# Patient Record
Sex: MALE | Race: WHITE | NOT HISPANIC OR LATINO | Employment: OTHER
[De-identification: names, ages, dates, MRNs, and addresses within clinical notes are randomized per-mention and may not be internally consistent; named-entity substitution may affect disease eponyms.]

---

## 2017-01-27 ENCOUNTER — RECORDS - HEALTHEAST (OUTPATIENT)
Dept: ADMINISTRATIVE | Facility: OTHER | Age: 82
End: 2017-01-27

## 2017-01-27 ENCOUNTER — HOSPITAL ENCOUNTER (OUTPATIENT)
Dept: MRI IMAGING | Facility: HOSPITAL | Age: 82
Discharge: HOME | End: 2017-01-27

## 2017-01-27 DIAGNOSIS — M54.16 LUMBAR RADICULOPATHY: ICD-10-CM

## 2017-01-30 ENCOUNTER — AMBULATORY - HEALTHEAST (OUTPATIENT)
Dept: NEUROSURGERY | Facility: CLINIC | Age: 82
End: 2017-01-30

## 2017-01-30 DIAGNOSIS — M54.9 BACK PAIN: ICD-10-CM

## 2017-01-31 ENCOUNTER — AMBULATORY - HEALTHEAST (OUTPATIENT)
Dept: NEUROSURGERY | Facility: CLINIC | Age: 82
End: 2017-01-31

## 2017-01-31 ENCOUNTER — OFFICE VISIT - HEALTHEAST (OUTPATIENT)
Dept: NEUROSURGERY | Facility: CLINIC | Age: 82
End: 2017-01-31

## 2017-01-31 ENCOUNTER — HOSPITAL ENCOUNTER (OUTPATIENT)
Dept: RADIOLOGY | Facility: CLINIC | Age: 82
Discharge: HOME | End: 2017-01-31
Attending: NEUROLOGICAL SURGERY

## 2017-01-31 DIAGNOSIS — M46.1 SI JOINT ARTHRITIS (H): ICD-10-CM

## 2017-01-31 DIAGNOSIS — M54.9 BACK PAIN: ICD-10-CM

## 2017-01-31 ASSESSMENT — MIFFLIN-ST. JEOR: SCORE: 1662.83

## 2017-02-01 ENCOUNTER — HOSPITAL ENCOUNTER (OUTPATIENT)
Dept: PHYSICAL MEDICINE AND REHAB | Facility: CLINIC | Age: 82
Discharge: HOME | End: 2017-02-01
Attending: NEUROLOGICAL SURGERY

## 2017-02-01 DIAGNOSIS — M46.1 SI JOINT ARTHRITIS (H): ICD-10-CM

## 2017-02-01 DIAGNOSIS — M46.98 UNSPECIFIED INFLAMMATORY SPONDYLOPATHY, SACRAL AND SACROCOCCYGEAL REGION (H): ICD-10-CM

## 2017-02-06 ENCOUNTER — COMMUNICATION - HEALTHEAST (OUTPATIENT)
Dept: NEUROSURGERY | Facility: CLINIC | Age: 82
End: 2017-02-06

## 2019-08-29 NOTE — TELEPHONE ENCOUNTER
ONCOLOGY INTAKE: Records Information      APPT INFORMATION:  Referring provider:  JADA  Referring provider s clinic:  NA  Reason for visit/diagnosis:  2nd opinion, Metastatic Prostate Cancer  Has patient been notified of appointment date and time?: Per PT    RECORDS INFORMATION:  Were the records received with the referral (via [employer])? No    Has patient been seen for any external appt for this diagnosis? Per PT Original Bx @  [facility] approx 2015;  Imaging @ [facility]; Other records and imaging @ [facility]    ADDITIONAL INFORMATION:  JADA

## 2019-09-09 ENCOUNTER — PRE VISIT (OUTPATIENT)
Dept: ONCOLOGY | Facility: CLINIC | Age: 84
End: 2019-09-09

## 2019-09-09 ENCOUNTER — ONCOLOGY VISIT (OUTPATIENT)
Dept: ONCOLOGY | Facility: CLINIC | Age: 84
End: 2019-09-09
Attending: INTERNAL MEDICINE
Payer: MEDICARE

## 2019-09-09 VITALS
OXYGEN SATURATION: 95 % | TEMPERATURE: 97.6 F | DIASTOLIC BLOOD PRESSURE: 82 MMHG | SYSTOLIC BLOOD PRESSURE: 147 MMHG | HEIGHT: 68 IN | BODY MASS INDEX: 35.31 KG/M2 | RESPIRATION RATE: 14 BRPM | HEART RATE: 56 BPM | WEIGHT: 233 LBS

## 2019-09-09 DIAGNOSIS — C61 PROSTATE CANCER (H): Primary | ICD-10-CM

## 2019-09-09 PROBLEM — I45.9 CARDIAC CONDUCTION DISORDER: Status: ACTIVE | Noted: 2018-07-19

## 2019-09-09 PROBLEM — M54.59 MECHANICAL LOW BACK PAIN: Status: ACTIVE | Noted: 2017-02-08

## 2019-09-09 PROBLEM — M10.9 GOUT, ARTHRITIS: Status: ACTIVE | Noted: 2018-12-06

## 2019-09-09 PROCEDURE — 99204 OFFICE O/P NEW MOD 45 MIN: CPT | Mod: GC | Performed by: INTERNAL MEDICINE

## 2019-09-09 PROCEDURE — G0463 HOSPITAL OUTPT CLINIC VISIT: HCPCS | Mod: ZF

## 2019-09-09 RX ORDER — DIPHENOXYLATE HYDROCHLORIDE AND ATROPINE SULFATE 2.5; .025 MG/1; MG/1
TABLET ORAL
COMMUNITY

## 2019-09-09 RX ORDER — TRIAMTERENE AND HYDROCHLOROTHIAZIDE 37.5; 25 MG/1; MG/1
CAPSULE ORAL
Refills: 3 | COMMUNITY
Start: 2019-06-17

## 2019-09-09 RX ORDER — LISINOPRIL 20 MG/1
TABLET ORAL
COMMUNITY
Start: 2016-01-14 | End: 2019-09-09

## 2019-09-09 RX ORDER — LANOLIN ALCOHOL/MO/W.PET/CERES
1.5 CREAM (GRAM) TOPICAL
COMMUNITY

## 2019-09-09 RX ORDER — SILDENAFIL 100 MG/1
100 TABLET, FILM COATED ORAL
COMMUNITY
End: 2019-09-09

## 2019-09-09 RX ORDER — POTASSIUM CHLORIDE 750 MG/1
TABLET, EXTENDED RELEASE ORAL
Refills: 1 | COMMUNITY
Start: 2019-07-27

## 2019-09-09 ASSESSMENT — MIFFLIN-ST. JEOR: SCORE: 1706.38

## 2019-09-09 ASSESSMENT — PAIN SCALES - GENERAL: PAINLEVEL: MILD PAIN (2)

## 2019-09-09 NOTE — LETTER
9/9/2019       RE: Shailesh Hartman  [id number] [id number]     Dear Colleague,    Thank you for referring your patient, Shailesh Hartman, to the [facility]. Please see a copy of my visit note below.    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 He was diagnosed initially in 2013 when a ROXANN revealed a prostate nodule. Biopsy on 9/30/13 showed a [place] 4+4 prostate adenocarcinoma. PSA at that time was 14.2. He was treated with radiation in early 2014. PSA declined to a julisa of 1.21 following radiation. CT in April 2016 showed possible enlargement of the right iliac lymph nodes, biopsy of which showed prostate adenocarcinoma in May 2016. Bone scan at that time was negative. PSA kaila to 4.85 in June 2016 and he [facility] Surgical pathology showed 3 of 5 right iliac lymph nodes and 1 of 5 left iliac lymph nodes removed were positive for metastatic prostatic adenocarcinoma. Choline PET in November 2016 showed uptake in the left retroperitoneal lymph nodes and right iliac lymph nodes extending to a perirectal node. PSA was 3.9 at that time, and he was recommended to start ADT and radiotherapy. He started with a loading dose of Degarelix in November 2016 and transitioned to Lupron. PSA dropped to 0.98. He completed 25 fractions of radiotherapy to the pelvic and paraaortic lymph nodes. PSA became undetectable in March 2017. He continued Lupron until November 2017. In the spring of 2018 he noted intense fatigue with decreased exercise tolerance and stopped Lupron. PSA at that time was  0.14. Imaging in September 2018 demonstrated some multifocal activity within the prostate gland and very faint non-specific activity along the internal iliac chain, as well as focal activity in the right side of T2 and T4 vertebral bodies. It was thought that the small rise in his PSA was secondary to benign tissue. Repeat imaging in January 2019 demonstrated interval decrease in the activity in the prostate, upper thoracic spine, and lymphadenopathy. No new bone metastases were identified. He was recommended to continue to monitor his PSA (off ADT) and return intermittently for imaging. Choline PET in June 2019 showed ongoing uptake in the T2 and T4 vertebral bodies, but now faint. His case was evidently discussed at a tumor conference and it was thought that he may benefit from focused radiation to the T-spine lesions. PSA over this time period has remained low but detectable.     He presents to clinic today with his wife seeking a second opinion regarding his prostate cancer. He is hoping to avoid radiation to the spine due to fear of complications and lack of benefit given overall stable/improved appearance on the last several PET scans. He is overall doing very well. He remains very active and travels frequently with family. He has some mid-back pain but relates this more to degenerative changes rather than pain from the known T-spine lesions. No bothersome urinary symptoms. He reports excellent energy level off of Lupron, and spends a couple hours in a gym a few times per week.     REVIEW OF SYSTEMS:  A full 14 point ROS negative other than the symptoms noted above in the HPI.    PAST MEDICAL HISTORY:  Active Ambulatory Problems     Diagnosis Date Noted     Achilles tendonitis 01/28/2010     Family history of colon cancer 12/01/2015     Mechanical low back pain 02/08/2017     Primary prostate cancer with metastasis from prostate to other site (H) 11/08/2016     Malignant neoplasm of prostate (H) 06/12/2014      Primary malignant neoplasm of prostate (H) 2016     Hyperlipidemia 2005     Gout, arthritis 2018     Essential hypertension, benign 2001     Elevated prostate specific antigen (PSA) 2001     Cardiac conduction disorder 2018     Bradycardia 2010     Resolved Ambulatory Problems     Diagnosis Date Noted     No Resolved Ambulatory Problems     No Additional Past Medical History       PAST SURGICAL HISTORY:   Past Surgical History:   Procedure Laterality Date     ADENOIDECTOMY  1938     HIP SURGERY          SOCIAL HISTORY:   - Lives with wife in [place], [place]. 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 They travel frequently to [place] to visit family members.  - Non-smoker. No EtOH.    FAMILY HISTORY:   - Father  with pancreatic cancer in his 70s.   - Daughter was diagnosed with Stage IV colorectal cancer and has been living with this for >7 years.  - Son has diabetes.    ALLERGIES:   Allergies   Allergen Reactions     Seasonal Allergies Other (See Comments)     Running nose     Wheat Bran Other (See Comments) and Unknown     Runny nose  Runny nose         CURRENT MEDICATIONS:     Current Outpatient Medications:      allopurinol (ZYLOPRIM) 300 MG tablet, Take 300 mg by mouth daily, Disp: , Rfl:      diphenhydrAMINE-acetaminophen (TYLENOL PM)  MG tablet, Take 1 tablet by mouth nightly as needed for sleep, Disp: , Rfl:      hydrochlorothiazide (MICROZIDE) 12.5 MG capsule, Take 12.5 mg by mouth daily, Disp: , Rfl:      lisinopril (PRINIVIL,ZESTRIL) 20 MG tablet, Take 20 mg by mouth 2 times daily, Disp: , Rfl:      melatonin 3 MG tablet, Take 1.5 mg by mouth nightly as needed for sleep, Disp: , Rfl:      Multiple Vitamin (MULTI-VITAMINS) TABS, 1 tab by mouth daily, Disp: , Rfl:      potassium chloride ER (K-TAB/KLOR-CON) 10 MEQ CR tablet, TAKES 10 MEQ  "BID, Disp: , Rfl: 1     simvastatin (ZOCOR) 20 MG tablet, Take 1 tablet by mouth At Bedtime, Disp: , Rfl:      triamterene-HCTZ (DYAZIDE) 37.5-25 MG capsule, TK 1 C PO D, Disp: , Rfl: 3    PHYSICAL EXAMINATION:  Vital signs: BP (!) 147/82 (BP Location: Left arm, Patient Position: Chair, Cuff Size: Adult Large)   Pulse 56   Temp 97.6  F (36.4  C) (Oral)   Resp 14   Ht 1.727 m (5' 8\")   Wt 105.7 kg (233 lb)   SpO2 95%   BMI 35.43 kg/m     ECOG performance status of 0. Fatigue 0.  GENERAL: Well-nourished healthy-appearing man, seated in chair, no acute distress.   HEENT: No icterus, no pallor. Moist mucous membranes. Oropharynx is clear.   NECK: Supple, no JVD/LAD.  LUNGS: Clear to ausculation bilaterally, normal work of breathing.   CARDIOVASCULAR: Regular rate and rhythm, no murmurs, gallops or rubs.   ABDOMEN: Soft, nontender and nondistended, no palpable masses, bowel sounds present.  EXTREMITIES: No cyanosis, no clubbing, no edema.   NEUROLOGIC: Alert and oriented. No focal deficits.    LABORATORY DATA:  Reviewed most recent labs from [place] in June 2019:  - Notable for normal BMP, LFTs    PSA Trend: (reviewed in Care Everywhere)    6/28/2016:  4.85    11/28/2016:  3.9 (started degarelix, transitioned to Lupron)    12/29/2016:  0.98    3/6/2017:  0.12    3/31/2017 - 4/26/2018:  <0.01 (stopped Lupron in early 2018)    7/19/2018:  0.14    9/21/2018:  0.34    1/25/2019:  0.70    6/5/2019:  0.82    8/21/2019:  0.80    Testosterone Levels: (reviewed in Care Everywhere)    11/28/2016:  244    12/29/2016 - 4/26/2018:  <7.0    7/19/2018:  89    9/21/2018:  176    6/5/2019:  251    IMAGING STUDIES:  CHOLINE PET/CT, 6/5/2019:  COMPARISON:  Choline PET/CT 1/25/2019.  IMPRESSION:  1.  Stable nonspecific diffuse uptake throughout prostate.  2.  Stable mild uptake within a tiny prevascular/aortopulmonary window lymph  node. Otherwise, no suspicious lymph nodes.  3.  Uptake within T2 and T4 vertebral body lesions are now " faint.    MR THORACIC SPINE WITHOUT IV CONTRAST, 6/5/2019:  COMPARISON: Prior PET ct choline studies the most recent dated 06/05/2019.  IMPRESSION:  1. Severe cervical spondylosis, with spinal stenosis and cord compression.  2. T2 and T4 metastases as described.  3. Lumbar spondylosis with spinal stenosis.    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 He was diagnosed initially in 2013 and was treated with radiation. He was found to have metastatic disease involving the pelvic lymph nodes, biopsy proven in 2016. He demonstrated good PSA response to ADT from late 2016 through early 2018, but did not tolerate well due to side effects, particularly fatigue, and stopped Lupron in early 2018. PSA has very gradually up-trended since then, and subsequent imaging has demonstrated concerning uptake in the T2 and T4 vertebral bodies, though this uptake has actually become more faint over time despite being off ADT for over a year. He was recommended focal radiation to these lesions, and is seeking a second opinion.     We suspect the patient has oligometastatic disease, initially quite hormone sensitive. Per his preference, he has now been off ADT for over a year, and testosterone level is now back in the normal range since last checked in June 2019. However, PSA remains very low and the only measurable disease includes the small T-spine lesions and a tiny lymph node in the chest. He prefers to stay off ADT and avoid additional systemic therapy and radiation. This is certainly a reasonable approach.    We discussed our recommendation to follow PSA and testosterone, and repeating imaging intermittently. If there is a significant rise in PSA over time, could consider simply restarting ADT. However, PSA rise with a low testosterone would argue for castration resistance. In this setting, could consider  abiraterone. In the meantime, we do agree that he should stay off ADT and continue observation. He will likely continue following with his team at [facility], but would certainly be welcome back in our clinic if concerns or questions arise.      Patient was seen and plan was discussed with Dr. Salvador.    Shanae Faust MD/PhD  Heme/Onc Fellow      Physician Attestation   I, Randall Ruiz MD, saw this patient and agree with the findings and plan of care as documented in the note.      Items personally reviewed/procedural attestation: vitals, labs and the plan as discussed.    Randall Ruiz MD

## 2019-09-09 NOTE — NURSING NOTE
"Oncology Rooming Note    September 9, 2019 9:06 AM   Shailesh Hartman is a 87 year old male who presents for:    Chief Complaint   Patient presents with     Oncology Clinic Visit     New Patient Visit for Prostate Cancer     Initial Vitals: BP (!) 147/82 (BP Location: Left arm, Patient Position: Chair, Cuff Size: Adult Large)   Pulse 56   Temp 97.6  F (36.4  C) (Oral)   Resp 14   Ht 1.727 m (5' 8\")   Wt 105.7 kg (233 lb)   SpO2 95%   BMI 35.43 kg/m   Estimated body mass index is 35.43 kg/m  as calculated from the following:    Height as of this encounter: 1.727 m (5' 8\").    Weight as of this encounter: 105.7 kg (233 lb). Body surface area is 2.25 meters squared.  Mild Pain (2) Comment: Data Unavailable   No LMP for male patient.  Allergies reviewed: Yes  Medications reviewed: Yes    Medications: Medication refills not needed today.  Pharmacy name entered into [email or web address] / [employer]: [facility] #[id number] - [facility] [address] AT [facility] OF [address]    Clinical concerns: Patient is here with his wife.  He is here for a second opinion on whether or not to do radiotherapy on lesions in T2 & T4.  He is currently a [facility] patient.  He does report some mild dysphagia at times but it does not interfere with his ability to eat.  He is maintaining his weight.  He also reports chronic mild left hip pain due to trochanteric bursitis.   [place] was notified.      Belia Humphrey, RN, MSN              "

## 2019-09-09 NOTE — PATIENT INSTRUCTIONS
1. Check Testosterone ( Total)  2. Agree with repeat scan in October  3. Many options available if the psa rises including   A. Focal radiation to the bony lesions   B. Abiraterone or similar drugs   C. Clinical trial with Abiraterone plus Abemiclib.

## 2019-09-09 NOTE — PROGRESS NOTES
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 He was diagnosed initially in 2013 when a ROXANN revealed a prostate nodule. Biopsy on 9/30/13 showed a [place] 4+4 prostate adenocarcinoma. PSA at that time was 14.2. He was treated with radiation in early 2014. PSA declined to a julisa of 1.21 following radiation. CT in April 2016 showed possible enlargement of the right iliac lymph nodes, biopsy of which showed prostate adenocarcinoma in May 2016. Bone scan at that time was negative. PSA kaila to 4.85 in June 2016 and he underwent robotic bilateral pelvic lymph node dissection (at the [facility]). Surgical pathology showed 3 of 5 right iliac lymph nodes and 1 of 5 left iliac lymph nodes removed were positive for metastatic prostatic adenocarcinoma. Choline PET in November 2016 showed uptake in the left retroperitoneal lymph nodes and right iliac lymph nodes extending to a perirectal node. PSA was 3.9 at that time, and he was recommended to start ADT and radiotherapy. He started with a loading dose of Degarelix in November 2016 and transitioned to Lupron. PSA dropped to 0.98. He completed 25 fractions of radiotherapy to the pelvic and paraaortic lymph nodes. PSA became undetectable in March 2017. He continued Lupron until November 2017. In the spring of 2018 he noted intense fatigue with decreased exercise tolerance and stopped Lupron. PSA at that time was 0.14. Imaging in September 2018 demonstrated some multifocal activity within the prostate gland and very faint non-specific activity along the internal iliac chain, as well as focal activity in the right side of T2 and T4 vertebral bodies. It was thought  that the small rise in his PSA was secondary to benign tissue. Repeat imaging in January 2019 demonstrated interval decrease in the activity in the prostate, upper thoracic spine, and lymphadenopathy. No new bone metastases were identified. He was recommended to continue to monitor his PSA (off ADT) and return intermittently for imaging. Choline PET in June 2019 showed ongoing uptake in the T2 and T4 vertebral bodies, but now faint. His case was evidently discussed at a tumor conference and it was thought that he may benefit from focused radiation to the T-spine lesions. PSA over this time period has remained low but detectable.     He presents to clinic today with his wife seeking a second opinion regarding his prostate cancer. He is hoping to avoid radiation to the spine due to fear of complications and lack of benefit given overall stable/improved appearance on the last several PET scans. He is overall doing very well. He remains very active and travels frequently with family. He has some mid-back pain but relates this more to degenerative changes rather than pain from the known T-spine lesions. No bothersome urinary symptoms. He reports excellent energy level off of Lupron, and spends a couple hours in a gym a few times per week.     REVIEW OF SYSTEMS:  A full 14 point ROS negative other than the symptoms noted above in the HPI.    PAST MEDICAL HISTORY:  Active Ambulatory Problems     Diagnosis Date Noted     Achilles tendonitis 01/28/2010     Family history of colon cancer 12/01/2015     Mechanical low back pain 02/08/2017     Primary prostate cancer with metastasis from prostate to other site (H) 11/08/2016     Malignant neoplasm of prostate (H) 06/12/2014     Primary malignant neoplasm of prostate (H) 11/08/2016     Hyperlipidemia 11/29/2005     Gout, arthritis 12/06/2018     Essential hypertension, benign 03/22/2001     Elevated prostate specific antigen (PSA) 03/22/2001     Cardiac conduction disorder  2018     Bradycardia 2010     Resolved Ambulatory Problems     Diagnosis Date Noted     No Resolved Ambulatory Problems     No Additional Past Medical History       PAST SURGICAL HISTORY:   Past Surgical History:   Procedure Laterality Date     ADENOIDECTOMY  1938     HIP SURGERY          SOCIAL HISTORY:   - 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 They travel frequently to [place] to visit family members.  - Non-smoker. No EtOH.    FAMILY HISTORY:   - Father  with pancreatic cancer in his 70s.   - Daughter was diagnosed with Stage IV colorectal cancer and has been living with this for >7 years.  - Son has diabetes.    ALLERGIES:   Allergies   Allergen Reactions     Seasonal Allergies Other (See Comments)     Running nose     Wheat Bran Other (See Comments) and Unknown     Runny nose  Runny nose         CURRENT MEDICATIONS:     Current Outpatient Medications:      allopurinol (ZYLOPRIM) 300 MG tablet, Take 300 mg by mouth daily, Disp: , Rfl:      diphenhydrAMINE-acetaminophen (TYLENOL PM)  MG tablet, Take 1 tablet by mouth nightly as needed for sleep, Disp: , Rfl:      hydrochlorothiazide (MICROZIDE) 12.5 MG capsule, Take 12.5 mg by mouth daily, Disp: , Rfl:      lisinopril (PRINIVIL,ZESTRIL) 20 MG tablet, Take 20 mg by mouth 2 times daily, Disp: , Rfl:      melatonin 3 MG tablet, Take 1.5 mg by mouth nightly as needed for sleep, Disp: , Rfl:      Multiple Vitamin (MULTI-VITAMINS) TABS, 1 tab by mouth daily, Disp: , Rfl:      potassium chloride ER (K-TAB/KLOR-CON) 10 MEQ CR tablet, TAKES 10 MEQ BID, Disp: , Rfl: 1     simvastatin (ZOCOR) 20 MG tablet, Take 1 tablet by mouth At Bedtime, Disp: , Rfl:      triamterene-HCTZ (DYAZIDE) 37.5-25 MG capsule, TK 1 C PO D, Disp: , Rfl: 3    PHYSICAL EXAMINATION:  Vital signs: BP (!) 147/82 (BP  "Location: Left arm, Patient Position: Chair, Cuff Size: Adult Large)   Pulse 56   Temp 97.6  F (36.4  C) (Oral)   Resp 14   Ht 1.727 m (5' 8\")   Wt 105.7 kg (233 lb)   SpO2 95%   BMI 35.43 kg/m    ECOG performance status of 0. Fatigue 0.  GENERAL: Well-nourished healthy-appearing man, seated in chair, no acute distress.   HEENT: No icterus, no pallor. Moist mucous membranes. Oropharynx is clear.   NECK: Supple, no JVD/LAD.  LUNGS: Clear to ausculation bilaterally, normal work of breathing.   CARDIOVASCULAR: Regular rate and rhythm, no murmurs, gallops or rubs.   ABDOMEN: Soft, nontender and nondistended, no palpable masses, bowel sounds present.  EXTREMITIES: No cyanosis, no clubbing, no edema.   NEUROLOGIC: Alert and oriented. No focal deficits.    LABORATORY DATA:  Reviewed most recent labs from [place] in June 2019:  - Notable for normal BMP, LFTs    PSA Trend: (reviewed in Care Everywhere)    6/28/2016:  4.85    11/28/2016:  3.9 (started degarelix, transitioned to Lupron)    12/29/2016:  0.98    3/6/2017:  0.12    3/31/2017 - 4/26/2018:  <0.01 (stopped Lupron in early 2018)    7/19/2018:  0.14    9/21/2018:  0.34    1/25/2019:  0.70    6/5/2019:  0.82    8/21/2019:  0.80    Testosterone Levels: (reviewed in Care Everywhere)    11/28/2016:  244    12/29/2016 - 4/26/2018:  <7.0    7/19/2018:  89    9/21/2018:  176    6/5/2019:  251    IMAGING STUDIES:  CHOLINE PET/CT, 6/5/2019:  COMPARISON:  Choline PET/CT 1/25/2019.  IMPRESSION:  1.  Stable nonspecific diffuse uptake throughout prostate.  2.  Stable mild uptake within a tiny prevascular/aortopulmonary window lymph  node. Otherwise, no suspicious lymph nodes.  3.  Uptake within T2 and T4 vertebral body lesions are now faint.    MR THORACIC SPINE WITHOUT IV CONTRAST, 6/5/2019:  COMPARISON: Prior PET ct choline studies the most recent dated 06/05/2019.  IMPRESSION:  1. Severe cervical spondylosis, with spinal stenosis and cord compression.  2. T2 and T4 " metastases as described.  3. Lumbar spondylosis with spinal stenosis.    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 He was diagnosed initially in 2013 and was treated with radiation. He was found to have metastatic disease involving the pelvic lymph nodes, biopsy proven in 2016. He demonstrated good PSA response to ADT from late 2016 through early 2018, but did not tolerate well due to side effects, particularly fatigue, and stopped Lupron in early 2018. PSA has very gradually up-trended since then, and subsequent imaging has demonstrated concerning uptake in the T2 and T4 vertebral bodies, though this uptake has actually become more faint over time despite being off ADT for over a year. He was recommended focal radiation to these lesions, and is seeking a second opinion.     We suspect the patient has oligometastatic disease, initially quite hormone sensitive. Per his preference, he has now been off ADT for over a year, and testosterone level is now back in the normal range since last checked in June 2019. However, PSA remains very low and the only measurable disease includes the small T-spine lesions and a tiny lymph node in the chest. He prefers to stay off ADT and avoid additional systemic therapy and radiation. This is certainly a reasonable approach.    We discussed our recommendation to follow PSA and testosterone, and repeating imaging intermittently. If there is a significant rise in PSA over time, could consider simply restarting ADT. However, PSA rise with a low testosterone would argue for castration resistance. In this setting, could consider abiraterone. In the meantime, we do agree that he should stay off ADT and continue observation. He will likely continue following with his team at [facility], but would certainly be welcome back in our clinic if concerns or questions  arise.      Patient was seen and plan was discussed with Dr. Salvador.    Shanae Faust MD/PhD  Heme/Onc Fellow      Physician Attestation   I, Randall Ruiz MD, saw this patient and agree with the findings and plan of care as documented in the note.      Items personally reviewed/procedural attestation: vitals, labs and the plan as discussed.    Randall Ruiz MD

## 2019-10-31 ENCOUNTER — TRANSFERRED RECORDS (OUTPATIENT)
Dept: HEALTH INFORMATION MANAGEMENT | Facility: CLINIC | Age: 84
End: 2019-10-31

## 2019-11-01 ENCOUNTER — TRANSFERRED RECORDS (OUTPATIENT)
Dept: HEALTH INFORMATION MANAGEMENT | Facility: CLINIC | Age: 84
End: 2019-11-01

## 2020-01-13 ENCOUNTER — PATIENT OUTREACH (OUTPATIENT)
Dept: ONCOLOGY | Facility: CLINIC | Age: 85
End: 2020-01-13

## 2020-01-13 NOTE — PROGRESS NOTES
Message given to writer from pt's daughter reporting that pt has recently developed swollen cervical lymph nodes and is wondering if he should make an appt with Dr. Salvador.     TC to pt. Pt stated the swollen lymph nodes are not severe, and that he doesn't have any other s/s of illness. Pt was mainly wondering if Dr. Salvador would recommend he get an appointment with any certain specialty at [facility] such as ENT. Recommended to pt that he start by seeing his PCP for an exam or any other preliminary workup before being sent to a specialist. Told pt he may certainly call back if he needs any recommendations at the [facility] if he does get referred and would like an opinion as to who is good. Pt stated understanding and agrees to call back with any further questions or concerns.

## 2020-02-24 ENCOUNTER — RECORDS - HEALTHEAST (OUTPATIENT)
Dept: ADMINISTRATIVE | Facility: OTHER | Age: 85
End: 2020-02-24

## 2020-02-27 ENCOUNTER — HOSPITAL ENCOUNTER (OUTPATIENT)
Dept: RADIOLOGY | Facility: HOSPITAL | Age: 85
Discharge: HOME | End: 2020-02-27
Attending: OTOLARYNGOLOGY

## 2020-02-27 DIAGNOSIS — R13.10 DYSPHAGIA, UNSPECIFIED TYPE: ICD-10-CM

## 2020-02-27 DIAGNOSIS — J34.2 DEVIATED NASAL SEPTUM: ICD-10-CM

## 2020-02-27 DIAGNOSIS — H61.23 IMPACTED CERUMEN, BILATERAL: ICD-10-CM

## 2020-02-27 DIAGNOSIS — R09.A2 FEELING OF FOREIGN BODY IN THROAT: ICD-10-CM

## 2021-05-28 ENCOUNTER — RECORDS - HEALTHEAST (OUTPATIENT)
Dept: ADMINISTRATIVE | Facility: CLINIC | Age: 86
End: 2021-05-28

## 2021-05-29 ENCOUNTER — RECORDS - HEALTHEAST (OUTPATIENT)
Dept: ADMINISTRATIVE | Facility: CLINIC | Age: 86
End: 2021-05-29

## 2021-05-30 VITALS — HEIGHT: 69 IN | WEIGHT: 221 LBS | BODY MASS INDEX: 32.73 KG/M2

## 2021-06-08 NOTE — PROGRESS NOTES
"NEUROSURGERY CONSULTATION NOTE    1/31/2017     Shailesh Hartman is a 84 y.o. male who is sent to us in consultation by Mar Perales     for evaluation of  His left leg radiculopathy.     The pt describes the symptoms as having  started  9 days ago, (after shoveling snow a weak before)  woke up with a severe muscle pain across his back.    Pt describes that started at the top of the SI joint not down his leg.  Has weakness of his left toe.     Pain is worse with standing worse with walking   and frequently better with sitting (\"sitting is the best\")  .  Lying down in one position can make him sore.    Also has trochanter bursitis  .     Pt was previously  treated with medrol dose pack without any relief .  No  Traction .  No injections.  Stopped aleve 6 mos ago takes ASA 81 QD not for last 3 days.       HPI:   2005 R partial facetectomy at L45   On hormonal therapy for prostate CA.  Prior dexascan ok per pt.(altho he described 10% chance of fx of left femur)   On CA and vit D     Past Medical History   Diagnosis Date     Cancer      prostate cancer     Dysphagia      Hyperlipidemia      Hypertension      Past Surgical History   Procedure Laterality Date     Hip surgery       Tonsillectomy       Back surgery       Joint replacement Right      QUYNH     Pr lap,prostatectomy,radical,w/nerve spare,incl robotic Bilateral 6/29/2016     Procedure: ROBOTIC BILATERAL PELVIC LYMPH NODE DISSECTION;  Surgeon: Mika Lugo MD;  Location: [facility];  Service: Urology         REVIEW OF SYSTEMS:  ROS reviewed with pt as documented on pt health form of 1/31/2017.    Negative cardiac (BBB, HTN, L atrial enlargement)  , pulmonary, hematological     .  No family hx of anesthetic reactions   .  No family hx of hypercoagulability        Takes tumeric  MEDICATIONS:  Current Outpatient Prescriptions   Medication Sig Dispense Refill     allopurinol (ZYLOPRIM) 300 MG tablet Take 300 mg by mouth daily.       " cholecalciferol, vitamin D3, 1,000 unit tablet Take 1,000 Units by mouth daily.       diphenhydrAMINE-acetaminophen (TYLENOL PM)  mg Tab Take 1 tablet by mouth bedtime.        hydrochlorothiazide (HYDRODIURIL) 12.5 MG tablet Take 12.5 mg by mouth daily.       lisinopril (PRINIVIL,ZESTRIL) 20 MG tablet Take 20 mg by mouth 2 (two) times a day.       multivitamin with minerals (THERA-M) 9 mg iron-400 mcg Tab tablet Take 1 tablet by mouth daily.       naproxen sodium (ALEVE) 220 MG tablet Take 220 mg by mouth daily.       potassium chloride (KLOR-CON) 10 MEQ CR tablet Take 20 mEq by mouth daily.       sildenafil (VIAGRA) 100 MG tablet Take 100 mg by mouth daily as needed for erectile dysfunction.       simvastatin (ZOCOR) 20 MG tablet Take 20 mg by mouth bedtime.       triamterene-hydrochlorothiazide (DYAZIDE) 37.5-25 mg per capsule Take 1 capsule by mouth daily.       No current facility-administered medications for this visit.          ALLERGIES/SENSITIVITIES:     Allergies   Allergen Reactions     Hay Fever And Allergy Relief Other (See Comments)     Running nose       PERTINENT SOCIAL HISTORY:   Quit smoking 1-2 ppd x 10 years   Social History     Social History     Marital status:      Spouse name: N/A     Number of children: N/A     Years of education: N/A     Social History Main Topics     Smoking status: Former Smoker     Quit date:      Smokeless tobacco: Never Used     Alcohol use 0.6 oz/week     1 Glasses of wine per week      Comment: Glass of wine a day     Drug use: No     Sexual activity: Not on file     Other Topics Concern     Not on file     Social History Narrative         FAMILY HISTORY:  Jean-Baptiste aneurysms in mother and sister, both ruptured, both heavy smokers.  Mom  79 female CA, dad  72 pancreatic CA.   No family history on file.     PHYSICAL EXAM:   Constitutional: There were no vitals taken for this visit.     Mental Status: A & O in no acute distress.  Affect is  appropriate.  Speech is fluent.  Recent and remote memory are intact.  Attention span and concentration are normal.     Cranial Nerves: CN1: grossly intact per patient recall. CN2: No funduscopic exam performed. CN3,4 & 6: Pupillary light response, lateral and vertical gaze normal.  No nystagmus.  Visual fields are full to confrontation. CN5: Intact to touch CN7: No facial weakness, smile, facial symmetry intact. CN8: Intact to spoken voice. CN9&10: Gag reflex, uvula midline, palate rises with phonation. CN11: Shoulder shrug 5/5 intact bilaterally. CN12: Tongue midline and moves freely from side to side.     Motor: No pronator drift of upper extremity. Normal bulk and tone all muscle groups of upper and lower extremities.       Sensory: Sensation intact bilaterally to light touch.  Decreased vibration LE compared to UE     Coordination:  Heel/toe/  gait intact.    Unsteady  tandem gait      Reflexes; supinator, biceps, triceps, knee/ ankle jerk intact. No  hoffmans/ can't tell    babinski/ clonus.    SLR is negative  But movement of pelvis triggers pain.     IMAGING: I personally reviewed all radiographic images    MRI  Offset at L45 with 9 mm  With stenosis at L23, (mod to severe)  L34 (left synovial cyst) and L45  (9mm anteriorlisthesis)  Mod stenosis, B foramenal stenosis; L5S1 disk paracentral disk narrows lateral recess       Flex/ext   Offset doesn't change much at L45    CONSULTATION ASSESSMENT AND PLAN:  Pt with  SI joint region pain (no leg pain ) which is triggered with the symptoms that typically would be expected to cause neurogenic claudication.  I do not detect toe weakness to my exam (but he has had oral steroids) .  This is a new symptom which started after shoveling (about a weak later).  I do not elicit any symptoms with stretch on his nerve.  This pain is triggered with with pressure immediately above the SI joint and with rocking the pelivs.     We will first inject SI joint.  If this is not  relieved then we will assume pain comes from stenosis at L23,34, 45 particularly the left synovial cyst off the L45 facet (which could've caused the sudden nature of his presentation of neurogenic claudication).    Fixing the stenosis particularly when he has had a facetectomy at the opposite side (R L45) will make him at risk for instability at L45 the level of his spondylisthesis.  We could fuse this at the same time as decompression or we could choose to try to get away without fusion (which did succeed in 2005 and might now).   My inclination would be to fix the obvious stenosis first and try to avoid a fusion.    I don't think his disk at L5S1 is symptomatic.       I spent more than 45 minutes in this apt, examining the pt, reviewing the scans, reviewing notes from chart, discussing treatment options with risks and benefits and coordinating care. >50 % clinic time was spent in face to face counseling and coordinating care    Argentina Taylor       Cc:   Mar Perales MD  [address] [place] [id number]     Dr. Owens  [address] [facility]  Dr. Perico Novoa  Dept of Urology, [address]  [id number]    Davey Moore   [facility] Dept Radiation Oncology  [address] [place] [address]  [phone]

## 2021-06-08 NOTE — PROGRESS NOTES
Neurosurgery consultation was requested by: Dr. Owens for evaluation of lumbar stenosis  Pain: presents in the bilateral low back - was an acute onset a week ago  Radicular Pain is present: in the buttock and hip - intermittent positional ache  Lhermitte sign: denies  Motor complaints: weakness in the big toe on the left  Sensory complaints: denies  Gait and balance issues: limps favoring on the right side, uses a cane  Bowel or bladder issues: denies incontinence or saddle anesthesia  Duration of SX is: for 1 week  The symptoms are worse with: standing and walking  The symptoms are better with: sitting and laying  Injury: unknown  Severity is: mild to moderate  Patient has tried the following conservative measures: on Medrol Dosepak now  MERYL score is: 42%  Love Castro RN, CNRN

## 2021-06-08 NOTE — PROGRESS NOTES
A consult was placed to Dr. Taylor.  The reason for the consult was LBP.  The following XR were ordered to assess for instability: Flex/Ext.  Love Castro RN, CNRN

## 2021-08-12 ENCOUNTER — TRANSFERRED RECORDS (OUTPATIENT)
Dept: HEALTH INFORMATION MANAGEMENT | Facility: CLINIC | Age: 86
End: 2021-08-12

## 2023-04-19 ENCOUNTER — TELEPHONE (OUTPATIENT)
Dept: SURGERY | Facility: CLINIC | Age: 88
End: 2023-04-19
Payer: MEDICARE

## 2023-04-19 NOTE — TELEPHONE ENCOUNTER
[facility] Call Center    Phone Message    May a detailed message be left on voicemail: yes     Reason for Call: Other: Patient called requesting Dr howard possibly referred by dr lee. States he has fragmented stool. Family history of colon cancer. Patient insistent on getting scheduled with gaertner earlier than the first available in Sep. Please call back asap to schedule    Action Taken: Message routed to:  [facility] crs    Travel Screening: Not Applicable

## 2023-04-20 NOTE — TELEPHONE ENCOUNTER
Diagnosis, Referred by & from: Fragmented Stools   Appt date: 5/1/2023   NOTES STATUS DETAILS   OFFICE NOTE from referring provider N/A    OFFICE NOTE from other specialist Care Everywhere / Internal [facility]:  3/1/23 - DERM OV with Dr. Jean  1/6/23 - URO OV with Dr. Novoa    [facility]:  9/9/19 - ONC OV with Dr. Salvador  12/1/15 - CR OV with Dr. Restrepo   DISCHARGE SUMMARY from hospital N/A    DISCHARGE REPORT from the ER N/A    OPERATIVE REPORT Internal [facility]:  6/29/16 - OP Note for BILATERAL PELVIC LYMPH NODE DISSECTION with Dr. Lugo   MEDICATION LIST Care Everywhere    LABS     ANAL PAP/CEA Care Everywhere [facility]:  1/25/19 - CEA   BIOPSIES/PATHOLOGY RELATED TO DIAGNOSIS N/A    DIAGNOSTIC PROCEDURES     PFC TESTING (from the Pelvic floor center includes Manometry, PDNL, EMG, etc.) N/A    COLONOSCOPY N/A    UPPER ENDOSCOPY (EGD) N/A    FLEX SIGMOIDOSCOPY N/A    ERCP N/A    IMAGING (DISC & REPORT)      CT In process - received  [facility]:  1/10/23 - CT Pelvis  12/29/22 - PET/CT  8/29/22 - PET/CT  4/22/22 - PET/CT  1/6/22 - CT Pelvis  12/8/21 - PET/CT  8/3/21 - PET/CT  4/2/21 - PET/CT  11/10/20 - PET/CT  7/10/20 - PET/CT  3/2/20 - PET/CT  10/31/19 - PET/CT  6/5/19 - PET/CT  1/25/19 - PET/CT  9/21/18 - PET/CT    [facility]:  1/24/20 - PET/CT   MRI N/A    XRAY In process - received  [facility]:  8/24/22 - XR Plevis  4/26/22 - XR Pelvis   ULTRASOUND  (ENDOANAL/ENDORECTAL) N/A      Records Requested  04/20/23    Facility  [facility] Phone: [phone] (MRN: [id number])  Fax: [phone]  [facility]  Fax: [phone]   Outcome * 4/20/23 9:49 AM Faxed urg req to [facility] and [facility] for images to be pushed to [facility]. - Breana    * 4/20/23 3:52 PM Images received from [facility] and attached to the patient in PACs. - Breana    4/25/23 8AM called medical records, they are very behind on requests. Rep took the imaging req over the phone and will have images pushed before the end of the day - Amay    4/25/23 9:42AM received images, missing XRAYs. Called and  spoke with rep, XRAYS will be pushed shortly - Amay

## 2023-04-20 NOTE — CONFIDENTIAL NOTE
"Shailesh said he has been experiencing \"fragmented stool\", he states it is thin and ribbonlike- no other symptoms. he is an old patient of Dr. Restrepo and he said he called him and he was referred to Dr. Moore. He will see Dr. Moore for assessment on 5/1/23    "

## 2023-04-25 NOTE — PROGRESS NOTES
Colon and Rectal Surgery Clinic Note    RE: Shailesh Hartman.  : 1932.  KEM: 2023.    Reason for visit: Changes in bowel habits.      HPI:   89-year-old male who presents with a recent history of thin stools in a ribbonlike configuration. Denies constipation, excessive straining, blood in his stool or blood per rectum.  Also denies abdominal pain, abdominal distention, and unintentional weight loss. Tolerating diet well. Last colonoscopy was many years ago and previously saw my partner Dr. Restrepo in  who did not recommend any further work-up or intervention. Family history significant for colon cancer in his daughter in her 50s.  Personal history significant for prostate cancer status post pelvic radiation followed by pelvic lymph node dissection and further proton therapy at [facility] back in .  He also recently started pelvic floor physical therapy.       Medical history:  No past medical history on file.    Surgical history:  Past Surgical History:   Procedure Laterality Date     ADENOIDECTOMY       BACK SURGERY       HIP SURGERY       HIP SURGERY       IR LUMBAR EPIDURAL STEROID INJECTION  2005     JOINT REPLACEMENT Right     QUYNH     NE LAP,PROSTATECTOMY,RADICAL,W/NERVE SPARE,INCL ROBOTIC Bilateral 2016    Procedure: ROBOTIC BILATERAL PELVIC LYMPH NODE DISSECTION;  Surgeon: Mika Lugo MD;  Location: [facility];  Service: Urology     TONSILLECTOMY         Family history:  Family History   Problem Relation Age of Onset     Glaucoma No family hx of      Macular Degeneration No family hx of      Cancer Father         Pancrease cancer     Diabetes Son      Cancer Daughter         Colon Cancer       Medications:  Current Outpatient Medications   Medication Sig Dispense Refill     allopurinol (ZYLOPRIM) 300 MG tablet Take 300 mg by mouth daily       diphenhydrAMINE-acetaminophen (TYLENOL PM)  MG tablet Take 1 tablet by mouth nightly as needed for  "sleep       hydrochlorothiazide (MICROZIDE) 12.5 MG capsule Take 12.5 mg by mouth daily       lisinopril (PRINIVIL,ZESTRIL) 20 MG tablet Take 20 mg by mouth 2 times daily       melatonin 3 MG tablet Take 1.5 mg by mouth nightly as needed for sleep       Multiple Vitamin (MULTI-VITAMINS) TABS 1 tab by mouth daily       potassium chloride ER (K-TAB/KLOR-CON) 10 MEQ CR tablet TAKES 10 MEQ BID  1     simvastatin (ZOCOR) 20 MG tablet Take 1 tablet by mouth At Bedtime       triamterene-HCTZ (DYAZIDE) 37.5-25 MG capsule TK 1 C PO D  3       Allergies:  Allergies   Allergen Reactions     Seasonal Allergies Other (See Comments)     Running nose     Wheat Bran Other (See Comments) and Unknown     Runny nose  Runny nose         Social history:   Social History     Tobacco Use     Smoking status: Former     Smokeless tobacco: Never   Vaping Use     Vaping status: Not on file   Substance Use Topics     Alcohol use: Not on file     Marital status: .    Physical Examination:  BP (!) 168/61 (BP Location: Left arm, Patient Position: Sitting, Cuff Size: Adult Large)   Pulse 55   Wt 97.7 kg (215 lb 6.4 oz)   SpO2 97%   BMI 32.75 kg/m    General: Well hydrated. No acute distress.  Abdomen: Soft, NT. No inguinal adenopathy palpated.  Perianal external examination:  Perianal skin: intact.  Lesions: No.  Eversion of buttocks: There was not evidence of an anal fissure. Details: N/A.  Skin tags or external hemorrhoids: Yes: Posterior based skin tag.  Digital rectal examination: Was performed.   Sphincter tone: Fair.  Palpable lesions: Small anal skin tag that prolapses easily.  Soft and nontender.    Other: None.  Bimanual examination: was not performed.    Investigations:  None.    Procedures:  Flexible sigmoidoscopy: after obtaining informed consent and performing a \"time out\", an adult flexible sigmoidoscope was introduced through the anus and passed up to the proximal descending colon. The quality of the prep was good. " Findings: Extensive diverticulosis of the descending and sigmoid colon.  1.5-2 cm broad-based pedunculated polyp at the distal sigmoid colon. No concerning findings for malignant polyp. Mild post radiation proctitis. No additional abnormalities were seen. Total scope time: 10 minutes. The patient tolerated the procedure well.    ASSESSMENT  89-year-old male with recent changes in bowel habits.  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 Risks, benefits, and alternatives of operative treatment were thoroughly discussed with the patient, he/she understands these well and agrees to proceed.    PLAN  1.  Schedule colonoscopy with polypectomy.  Will need full bowel prep.    45 minutes spent on the date of encounter performing chart review, history and exam, documentation and further activities as noted above with an additional 15 minutes for flexible sigmoidoscopy.    Jerson Moore MD, MSc, FACS, FASCRS.    Chief, Division of Colon & Rectal Surgery  Stanley M. Goldberg, MD Endowed Chair, Colon & Rectal Surgery  Department of Surgery  [facility]      Referring Provider:  No referring provider defined for this encounter.     Primary Care Provider:  Yordy Bradley

## 2023-04-27 ENCOUNTER — MYC MEDICAL ADVICE (OUTPATIENT)
Dept: SURGERY | Facility: CLINIC | Age: 88
End: 2023-04-27
Payer: MEDICARE

## 2023-05-01 ENCOUNTER — TELEPHONE (OUTPATIENT)
Dept: GASTROENTEROLOGY | Facility: CLINIC | Age: 88
End: 2023-05-01

## 2023-05-01 ENCOUNTER — OFFICE VISIT (OUTPATIENT)
Dept: SURGERY | Facility: CLINIC | Age: 88
End: 2023-05-01
Payer: MEDICARE

## 2023-05-01 ENCOUNTER — PRE VISIT (OUTPATIENT)
Dept: SURGERY | Facility: CLINIC | Age: 88
End: 2023-05-01

## 2023-05-01 VITALS
WEIGHT: 215.4 LBS | SYSTOLIC BLOOD PRESSURE: 168 MMHG | HEART RATE: 55 BPM | OXYGEN SATURATION: 97 % | DIASTOLIC BLOOD PRESSURE: 61 MMHG | BODY MASS INDEX: 32.75 KG/M2

## 2023-05-01 DIAGNOSIS — R19.4 CHANGE IN BOWEL HABITS: Primary | ICD-10-CM

## 2023-05-01 DIAGNOSIS — Z12.11 ENCOUNTER FOR SCREENING COLONOSCOPY: ICD-10-CM

## 2023-05-01 PROCEDURE — 45331 SIGMOIDOSCOPY AND BIOPSY: CPT | Mod: PT | Performed by: COLON & RECTAL SURGERY

## 2023-05-01 PROCEDURE — 99204 OFFICE O/P NEW MOD 45 MIN: CPT | Mod: 25 | Performed by: COLON & RECTAL SURGERY

## 2023-05-01 ASSESSMENT — ENCOUNTER SYMPTOMS
MYALGIAS: 0
CONSTIPATION: 0
ORTHOPNEA: 0
HYPOTENSION: 0
HEARTBURN: 0
BLOOD IN STOOL: 0
JAUNDICE: 0
SYNCOPE: 0
BLOATING: 0
MUSCLE WEAKNESS: 0
LIGHT-HEADEDNESS: 0
ARTHRALGIAS: 1
PALPITATIONS: 0
SLEEP DISTURBANCES DUE TO BREATHING: 0
ABDOMINAL PAIN: 0
DIARRHEA: 0
RECTAL PAIN: 0
STIFFNESS: 1
VOMITING: 0
HYPERTENSION: 1
NAUSEA: 0
LEG PAIN: 0
EXERCISE INTOLERANCE: 1
MUSCLE CRAMPS: 0
BOWEL INCONTINENCE: 0
JOINT SWELLING: 0
NECK PAIN: 0
BACK PAIN: 1

## 2023-05-01 ASSESSMENT — PAIN SCALES - GENERAL: PAINLEVEL: NO PAIN (0)

## 2023-05-01 NOTE — LETTER
2023       RE: Shailesh Hartman  [address] [facility] [address]       Dear Colleague,    Thank you for referring your patient, Shailesh Hartman, to the [facility] [place] at [facility]. Please see a copy of my visit note below.    Colon and Rectal Surgery Clinic Note    RE: Shailesh Hartman.  : 1932.  KEM: 2023.    Reason for visit: Changes in bowel habits.      HPI:   89-year-old male who presents with a recent history of thin stools in a ribbonlike configuration. Denies constipation, excessive straining, blood in his stool or blood per rectum.  Also denies abdominal pain, abdominal distention, and unintentional weight loss. Tolerating diet well. Last colonoscopy was many years ago and previously saw my partner Dr. Restrepo in  who did not recommend any further work-up or intervention. Family history significant for colon cancer in his daughter in her 50s.  Personal history significant for prostate cancer status post pelvic radiation followed by pelvic lymph node dissection and further proton therapy at [facility] back in .  He also recently started pelvic floor physical therapy.       Medical history:  No past medical history on file.    Surgical history:  Past Surgical History:   Procedure Laterality Date    ADENOIDECTOMY      BACK SURGERY      HIP SURGERY      HIP SURGERY      IR LUMBAR EPIDURAL STEROID INJECTION  2005    JOINT REPLACEMENT Right     QUYNH    MO LAP,PROSTATECTOMY,RADICAL,W/NERVE SPARE,INCL ROBOTIC Bilateral 2016    Procedure: ROBOTIC BILATERAL PELVIC LYMPH NODE DISSECTION;  Surgeon: Mika Lugo MD;  Location: [facility] OR;  Service: Urology    TONSILLECTOMY         Family history:  Family History   Problem Relation Age of Onset    Glaucoma No family hx of     Macular Degeneration No family hx of     Cancer Father          Pancrease cancer    Diabetes Son     Cancer Daughter         Colon Cancer       Medications:  Current Outpatient Medications   Medication Sig Dispense Refill    allopurinol (ZYLOPRIM) 300 MG tablet Take 300 mg by mouth daily      diphenhydrAMINE-acetaminophen (TYLENOL PM)  MG tablet Take 1 tablet by mouth nightly as needed for sleep      hydrochlorothiazide (MICROZIDE) 12.5 MG capsule Take 12.5 mg by mouth daily      lisinopril (PRINIVIL,ZESTRIL) 20 MG tablet Take 20 mg by mouth 2 times daily      melatonin 3 MG tablet Take 1.5 mg by mouth nightly as needed for sleep      Multiple Vitamin (MULTI-VITAMINS) TABS 1 tab by mouth daily      potassium chloride ER (K-TAB/KLOR-CON) 10 MEQ CR tablet TAKES 10 MEQ BID  1    simvastatin (ZOCOR) 20 MG tablet Take 1 tablet by mouth At Bedtime      triamterene-HCTZ (DYAZIDE) 37.5-25 MG capsule TK 1 C PO D  3       Allergies:  Allergies   Allergen Reactions    Seasonal Allergies Other (See Comments)     Running nose    Wheat Bran Other (See Comments) and Unknown     Runny nose  Runny nose         Social history:   Social History     Tobacco Use    Smoking status: Former    Smokeless tobacco: Never   Vaping Use    Vaping status: Not on file   Substance Use Topics    Alcohol use: Not on file     Marital status: .    Physical Examination:  BP (!) 168/61 (BP Location: Left arm, Patient Position: Sitting, Cuff Size: Adult Large)   Pulse 55   Wt 97.7 kg (215 lb 6.4 oz)   SpO2 97%   BMI 32.75 kg/m    General: Well hydrated. No acute distress.  Abdomen: Soft, NT. No inguinal adenopathy palpated.  Perianal external examination:  Perianal skin: intact.  Lesions: No.  Eversion of buttocks: There was not evidence of an anal fissure. Details: N/A.  Skin tags or external hemorrhoids: Yes: Posterior based skin tag.  Digital rectal examination: Was performed.   Sphincter tone: Fair.  Palpable lesions: Small anal skin tag that prolapses easily.  Soft and nontender.    Other:  "None.  Bimanual examination: was not performed.    Investigations:  None.    Procedures:  Flexible sigmoidoscopy: after obtaining informed consent and performing a \"time out\", an adult flexible sigmoidoscope was introduced through the anus and passed up to the proximal descending colon. The quality of the prep was good. Findings: Extensive diverticulosis of the descending and sigmoid colon.  1.5-2 cm broad-based pedunculated polyp at the distal sigmoid colon. No concerning findings for malignant polyp. Mild post radiation proctitis. No additional abnormalities were seen. Total scope time: 10 minutes. The patient tolerated the procedure well.    ASSESSMENT  89-year-old male with recent changes in bowel habits.  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 Risks, benefits, and alternatives of operative treatment were thoroughly discussed with the patient, he/she understands these well and agrees to proceed.    PLAN  1.  Schedule colonoscopy with polypectomy.  Will need full bowel prep.    45 minutes spent on the date of encounter performing chart review, history and exam, documentation and further activities as noted above with an additional 15 minutes for flexible sigmoidoscopy.      Referring Provider:  No referring provider defined for this encounter.     Primary Care Provider:  Yordy Bradley      Again, thank you for allowing me to participate in the care of your patient.      Sincerely,    Jerson Moore MD      "

## 2023-05-01 NOTE — TELEPHONE ENCOUNTER
Screening Questions  BLUE  KIND OF PREP RED  LOCATION [review exclusion criteria] GREEN  SEDATION TYPE        n Are you active on mychart?       Jerson Moore MD in [facility] Ordering/Referring Provider?        Medicare What type of coverage do you have?      n Have you had a positive covid test in the last 14 days?     31.7 1. BMI  [BMI 40+ - review exclusion criteria& smart-phrase document]    y  2. Are you able to give consent for your medical care? [IF NO,RN REVIEW]          n  3. Are you taking any prescription pain medications on a routine schedule   (ex narcotics: oxycodone, roxicodone, oxycontin,  and percocet)? [RN Review]        n  3a. EXTENDED PREP What kind of prescription?     n 4. Do you have any chemical dependencies such as alcohol, street drugs, or methadone?        **If yes 3- 5 , please schedule with MAC sedation.**          IF YES TO ANY 6 - 10 - HOSPITAL SETTING ONLY.     n 6.   Do you need assistance transferring?     n 7.   Have you had a heart or lung transplant?    n 8.   Are you currently on dialysis?   n 9.   Do you use daily home oxygen?   n 10. Do you take nitroglycerin?   10a. n If yes, how often?     11. [FEMALES]  n Are you currently pregnant?    11a. n If yes, how many weeks? [ Greater than 12 weeks, OR NEEDED]    n 12. Do you have Pulmonary Hypertension? *NEED PAC APPT AT [facility] w/ MAC*     n 13. [review exclusion criteria]  Do you have any implantable devices in your body (pacemaker, defib, LVAD)?    n 14. In the past 6 months, have you had any heart related issues including cardiomyopathy or heart attack?     14a. n If yes, did it require cardiac stenting if so when?     n 15. Have you had a stroke or Transient ischemic attack (TIA - aka  mini stroke ) within 6 months?      n 16. Do you have mod to severe Obstructive Sleep Apnea?  [Hospital only]    n 17. Do you have SEVERE AND UNCONTROLLED asthma? *NEED PAC APPT AT [facility] w/MAC*     18. Are you  "currently taking any blood thinners?     18a. No. Continue to 19.   18b. Yes/no Blood Thinner: No [CONTINUE TO #19]    n 19. Do you take the medication Phentermine?    19a. If yes, \"Hold for 7 days before procedure.  Please consult your prescribing provider if you have questions about holding this medication.\"     n  20. Do you have chronic kidney disease?      n  21. Do you have a diagnosis of diabetes?     n  22. On a regular basis do you go 3-5 days between bowel movements?      23. Preferred LOCAL Pharmacy for Pre Prescription    [ LIST ONLY ONE PHARMACY]          [facility]/PHARMACY #[id number] - [place], [place] - [address]        - CLOSING REMINDERS -    Informed patient they will need an adult    Cannot take any type of public or medical transportation alone    Conscious Sedation- Needs  for 6 hours after the procedure       MAC/General-Needs  for 24 hours after procedure    Pre-Procedure Covid test to be completed [[facility] PCR Testing Required]    Confirmed Nurse will call to complete assessment       - SCHEDULING DETAILS -  n Hospital Setting Required? If yes, what is the exclusion?: faizan Moore  Surgeon    05/17/2023  Date of Procedure  Lower Endoscopy [Colonoscopy]  [facility] of Procedure [facility]uled  [facility]   MIRALAX GATORADE WITHOUT MAGNEISUM CITRATE Which Colonoscopy Prep was Sent?     mac Sedation Type     n PAC / Pre-op Required                 "

## 2023-05-01 NOTE — PATIENT INSTRUCTIONS
Endoscopy will call you to schedule your colonoscopy. If you do not hear from them in 2-3 days please reach out.

## 2023-05-01 NOTE — NURSING NOTE
Chief Complaint   Patient presents with     New Patient     Fragmented Stools       Vitals:    05/01/23 0950   BP: (!) 168/61   BP Location: Left arm   Patient Position: Sitting   Cuff Size: Adult Large   Pulse: 55   SpO2: 97%   Weight: 215 lb 6.4 oz       Body mass index is 32.75 kg/m .    Ame Ray CMA

## 2023-05-02 ENCOUNTER — TELEPHONE (OUTPATIENT)
Dept: GASTROENTEROLOGY | Facility: CLINIC | Age: 88
End: 2023-05-02
Payer: MEDICARE

## 2023-05-02 DIAGNOSIS — R19.5 CHANGE IN STOOL CALIBER: Primary | ICD-10-CM

## 2023-05-02 RX ORDER — BISACODYL 5 MG/1
TABLET, DELAYED RELEASE ORAL
Qty: 4 TABLET | Refills: 0 | Status: SHIPPED | OUTPATIENT
Start: 2023-05-02

## 2023-05-02 NOTE — TELEPHONE ENCOUNTER
Patient scheduled for Colonoscopy  on 5.17.2023.     Discuss Covid policy.     Pre op exam needed? N/A    Arrival time: 1000. Procedure time 1100    Facility location: [facility]; [address], [place] [address]    Sedation type: MAC    Anticoagulations? No    Electronic implanted devices? No    Diabetic? No    Indication for procedure: change in stool caliber; family hx colon CA    Bowel prep recommendation: Standard Golytely d/t CKD; decreased GFR    Prep instructions sent via letter.  Bowel prep script sent to    [facility]/PHARMACY #[id number] - [place], [place] - [address]    Pre visit planning completed.    Nereyda Norwood RN  Endoscopy Procedure Pre Assessment RN

## 2023-05-03 NOTE — TELEPHONE ENCOUNTER
Attempted to contact patient regarding upcoming Colonoscopy  procedure on 5.17.2023 for pre assessment questions.    Patient is unable to talk at this time and requests a callback on Monday 5.8.2023.      Nereyda Norwood RN  Endoscopy Procedure Pre Assessment RN

## 2023-05-10 NOTE — TELEPHONE ENCOUNTER
Pre assessment questions completed for upcoming Colonoscopy  procedure scheduled on 5.17.2023    COVID policy reviewed.     Reviewed procedural arrival time 1000 and facility location [facility]; [address], [place] [address]    Designated  policy reviewed. Instructed to have someone stay 24 hours post procedure.     NSAIDs? No    Anticoagulation/blood thinners? No    Electronic implanted devices? No    Reviewed procedure prep instructions.     Patient verbalized understanding and had no questions or concerns at this time.    Nereyda Norwood RN  Endoscopy Procedure Pre Assessment RN

## 2023-05-17 ENCOUNTER — TRANSFERRED RECORDS (OUTPATIENT)
Dept: HEALTH INFORMATION MANAGEMENT | Facility: CLINIC | Age: 88
End: 2023-05-17
Payer: MEDICARE

## 2023-05-17 PROCEDURE — 88305 TISSUE EXAM BY PATHOLOGIST: CPT | Mod: TC,ORL | Performed by: COLON & RECTAL SURGERY

## 2023-05-18 ENCOUNTER — LAB REQUISITION (OUTPATIENT)
Dept: LAB | Facility: CLINIC | Age: 88
End: 2023-05-18
Payer: MEDICARE

## 2023-05-19 LAB
PATH REPORT.COMMENTS IMP SPEC: NORMAL
PATH REPORT.COMMENTS IMP SPEC: NORMAL
PATH REPORT.FINAL DX SPEC: NORMAL
PATH REPORT.GROSS SPEC: NORMAL
PATH REPORT.MICROSCOPIC SPEC OTHER STN: NORMAL
PATH REPORT.RELEVANT HX SPEC: NORMAL
PHOTO IMAGE: NORMAL

## 2023-05-19 PROCEDURE — 88305 TISSUE EXAM BY PATHOLOGIST: CPT | Mod: 26 | Performed by: PATHOLOGY

## 2023-05-23 ENCOUNTER — TRANSFERRED RECORDS (OUTPATIENT)
Dept: HEALTH INFORMATION MANAGEMENT | Facility: CLINIC | Age: 88
End: 2023-05-23
Payer: MEDICARE

## 2024-04-20 NOTE — TELEPHONE ENCOUNTER
DIAGNOSIS: Trigger Finger left hand middle to little finger/Self/Medicare/No images/DS 04/17/24    APPOINTMENT DATE: 4/24/24   NOTES STATUS DETAILS   OFFICE NOTE from referring provider N/A Self Referral   OFFICE NOTE from other specialist Care Everywhere 7/15/21 - Mar Perales MD - [employer]    MEDICATION LIST Internal    XRAYS (IMAGES & REPORTS) Care Everywhere 7/15/21 - XR Hand Left

## 2024-04-24 ENCOUNTER — ANCILLARY PROCEDURE (OUTPATIENT)
Dept: GENERAL RADIOLOGY | Facility: CLINIC | Age: 89
End: 2024-04-24
Attending: FAMILY MEDICINE
Payer: MEDICARE

## 2024-04-24 ENCOUNTER — OFFICE VISIT (OUTPATIENT)
Dept: ORTHOPEDICS | Facility: CLINIC | Age: 89
End: 2024-04-24
Payer: MEDICARE

## 2024-04-24 ENCOUNTER — PRE VISIT (OUTPATIENT)
Dept: ORTHOPEDICS | Facility: CLINIC | Age: 89
End: 2024-04-24

## 2024-04-24 DIAGNOSIS — M79.642 LEFT HAND PAIN: Primary | ICD-10-CM

## 2024-04-24 DIAGNOSIS — M79.642 LEFT HAND PAIN: ICD-10-CM

## 2024-04-24 DIAGNOSIS — N18.32 STAGE 3B CHRONIC KIDNEY DISEASE (H): ICD-10-CM

## 2024-04-24 DIAGNOSIS — I48.3 TYPICAL ATRIAL FLUTTER (H): ICD-10-CM

## 2024-04-24 DIAGNOSIS — D69.2 SENILE PURPURA (H): ICD-10-CM

## 2024-04-24 DIAGNOSIS — M65.30 TRIGGER FINGER OF LEFT HAND, UNSPECIFIED FINGER: ICD-10-CM

## 2024-04-24 PROBLEM — N18.31 STAGE 3A CHRONIC KIDNEY DISEASE (H): Status: ACTIVE | Noted: 2024-04-24

## 2024-04-24 PROCEDURE — 73130 X-RAY EXAM OF HAND: CPT | Mod: LT | Performed by: RADIOLOGY

## 2024-04-24 PROCEDURE — 99204 OFFICE O/P NEW MOD 45 MIN: CPT | Performed by: FAMILY MEDICINE

## 2024-04-24 NOTE — LETTER
4/24/2024      RE: Shailesh Hartman  [address] [address]     Dear Colleague,    Thank you for referring your patient, Shailesh Hartman, to the [facility] [place]. Please see a copy of my visit note below.    S:  89 yo male presents with triggering of his middle thru pinkie fingers.    -No imaging  -Retired Pathologist  -Symptoms for a couple of months  -R hand trigger fingers in the past which resolved on its own.   -Happens daily 1-2x per day  -Use Tylenol and that helps.   -Does use an elliptical and holds the handles to get his heart rate.  He holds it the entire work out.   -Middle finger is the worst of the 3.   -Some pain.   -Aching of the DIPS as well.   -No swelling  -Nervous to have steroid injections.       Current Outpatient Medications   Medication Sig Dispense Refill     allopurinol (ZYLOPRIM) 300 MG tablet Take 300 mg by mouth daily       bisacodyl (DULCOLAX) 5 MG EC tablet Take 2 tablets at 3 pm the day before your procedure. If your procedure is before 11 am, take 2 additional tablets at 11 pm. If your procedure is after 11 am, take 2 additional tablets at 6 am. For additional instructions refer to your colonoscopy prep instructions. 4 tablet 0     diphenhydrAMINE-acetaminophen (TYLENOL PM)  MG tablet Take 1 tablet by mouth nightly as needed for sleep       hydrochlorothiazide (MICROZIDE) 12.5 MG capsule Take 12.5 mg by mouth daily       lisinopril (PRINIVIL,ZESTRIL) 20 MG tablet Take 20 mg by mouth 2 times daily       melatonin 3 MG tablet Take 1.5 mg by mouth nightly as needed for sleep       Multiple Vitamin (MULTI-VITAMINS) TABS 1 tab by mouth daily       polyethylene glycol (GOLYTELY) 236 g suspension The night before the exam at 6 pm drink an 8-ounce glass every 15 minutes until the jug is half empty. If you arrive before 11 AM: Drink the other half of the Golytely jug at 11 PM night before procedure. If you arrive  after 11 AM: Drink the other half of the [employer] jug at 6 AM day of procedure. For additional instructions refer to your colonoscopy prep instructions. 4000 mL 0     potassium chloride ER (K-TAB/KLOR-CON) 10 MEQ CR tablet TAKES 10 MEQ BID  1     simvastatin (ZOCOR) 20 MG tablet Take 1 tablet by mouth At Bedtime       triamterene-HCTZ (DYAZIDE) 37.5-25 MG capsule TK 1 C PO D  3     No current facility-administered medications for this visit.     No past medical history on file.  Past Surgical History:   Procedure Laterality Date     ADENOIDECTOMY  1938     BACK SURGERY       HIP SURGERY  2007     HIP SURGERY       IR LUMBAR EPIDURAL STEROID INJECTION  4/28/2005     JOINT REPLACEMENT Right     QUYNH     VT LAP,PROSTATECTOMY,RADICAL,W/NERVE SPARE,INCL ROBOTIC Bilateral 6/29/2016    Procedure: ROBOTIC BILATERAL PELVIC LYMPH NODE DISSECTION;  Surgeon: Mika Lugo MD;  Location: [facility];  Service: Urology     TONSILLECTOMY       Social History     Socioeconomic History     Marital status:      Spouse name: Not on file     Number of children: Not on file     Years of education: Not on file     Highest education level: Not on file   Occupational History     Not on file   Tobacco Use     Smoking status: Former     Smokeless tobacco: Never   Substance and Sexual Activity     Alcohol use: Not on file     Drug use: Not on file     Sexual activity: Not on file   Other Topics Concern     Not on file   Social History Narrative     Not on file     Social Determinants of Health     Financial Resource Strain: Low Risk  (8/24/2022)    Received from [facility]    Overall Financial Resource Strain (CARDIA)   Food Insecurity: No Food Insecurity (8/24/2022)    Received from [facility]    Hunger Vital Sign      Worried About Running Out of Food in the Last Year: Never true      Ran Out of Food in the Last Year: Never true   Transportation Needs: No Transportation Needs (8/24/2022)    Received from [facility]     PRAPARE - Transportation   Physical Activity: Insufficiently Active (8/24/2022)    Received from [facility]    Exercise Vital Sign   Stress: No Stress Concern Present (8/24/2022)    Received from [facility]    [place] of Occupational Health - Occupational Stress Questionnaire      Feeling of Stress : Not at all   Social Connections: Socially Integrated (8/24/2022)    Received from [facility]    Social Connection and Isolation Panel [NHANES]   Interpersonal Safety: Not At Risk (8/24/2022)    Received from [facility]    Humiliation, Afraid, Rape, and Kick questionnaire      Fear of Current or Ex-Partner: No      Emotionally Abused: No      Physically Abused: No      Sexually Abused: No   Housing Stability: Low Risk  (8/24/2022)    Received from [facility]    Housing Stability Vital Sign       O:  NAD  There were no vitals taken for this visit.    L hand:  FROM with some restriction of the DIP flexion  No swelling  Nttp over the A1 pulley at the flexor crease  +triggering of the middle finger in the office  Full strength with extension and flexion of the fingers  Nttp over the CMC, IP or mcp of the thumb  NVI    Narrative & Impression   3 views left hand radiographs 4/24/2024 1:18 PM     History: Left hand pain     Comparison: None available     Findings:     PA, oblique and lateral view(s) of the left hand were obtained.      No acute osseous abnormality.  No erosion.     Mild degenerative changes of the first carpometacarpal and triscaphe  joints.  Additional narrowing of the distal interphalangeal joint of  the second digit.      Soft tissue is unremarkable.                                                                      Impression:  1. No acute osseous abnormality.  2. Mild degenerative changes of the first CMC joint.  3. Mild to moderate degenerative changes.     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   Discussed that he can continue his current treatment  Modify  positions; invest in a heart rate monitor watch or one that he wears on his body  Topical voltaren gel 4x per day for a couple weeks to see if that helps since he doesn't want to have injections.  Offered CSI MSK US guided trigger finger injections but he declines. Call to schedule if he changes his mind.     Tiffanie Best MD, CAQ, FACSM, CCD  [facility]  Sports Medicine and Bone Health  Team Physician;  Athletics          A:  Trigger fingers 3-5 LEFT    P:

## 2024-04-24 NOTE — PROGRESS NOTES
S:  89 yo male presents with triggering of his middle thru pinkie fingers.    -No imaging  -Retired Pathologist  -Symptoms for a couple of months  -R hand trigger fingers in the past which resolved on its own.   -Happens daily 1-2x per day  -Use Tylenol and that helps.   -Does use an elliptical and holds the handles to get his heart rate.  He holds it the entire work out.   -Middle finger is the worst of the 3.   -Some pain.   -Aching of the DIPS as well.   -No swelling  -Nervous to have steroid injections.       Current Outpatient Medications   Medication Sig Dispense Refill    allopurinol (ZYLOPRIM) 300 MG tablet Take 300 mg by mouth daily      bisacodyl (DULCOLAX) 5 MG EC tablet Take 2 tablets at 3 pm the day before your procedure. If your procedure is before 11 am, take 2 additional tablets at 11 pm. If your procedure is after 11 am, take 2 additional tablets at 6 am. For additional instructions refer to your colonoscopy prep instructions. 4 tablet 0    diphenhydrAMINE-acetaminophen (TYLENOL PM)  MG tablet Take 1 tablet by mouth nightly as needed for sleep      hydrochlorothiazide (MICROZIDE) 12.5 MG capsule Take 12.5 mg by mouth daily      lisinopril (PRINIVIL,ZESTRIL) 20 MG tablet Take 20 mg by mouth 2 times daily      melatonin 3 MG tablet Take 1.5 mg by mouth nightly as needed for sleep      Multiple Vitamin (MULTI-VITAMINS) TABS 1 tab by mouth daily      polyethylene glycol (GOLYTELY) 236 g suspension The night before the exam at 6 pm drink an 8-ounce glass every 15 minutes until the jug is half empty. If you arrive before 11 AM: Drink the other half of the Golytely jug at 11 PM night before procedure. If you arrive after 11 AM: Drink the other half of the Golytely jug at 6 AM day of procedure. For additional instructions refer to your colonoscopy prep instructions. 4000 mL 0    potassium chloride ER (K-TAB/KLOR-CON) 10 MEQ CR tablet TAKES 10 MEQ BID  1    simvastatin (ZOCOR) 20 MG tablet Take 1  tablet by mouth At Bedtime      triamterene-HCTZ (DYAZIDE) 37.5-25 MG capsule TK 1 C PO D  3     No current facility-administered medications for this visit.     No past medical history on file.  Past Surgical History:   Procedure Laterality Date    ADENOIDECTOMY  1938    BACK SURGERY      HIP SURGERY  2007    HIP SURGERY      IR LUMBAR EPIDURAL STEROID INJECTION  4/28/2005    JOINT REPLACEMENT Right     QUYNH    RI LAP,PROSTATECTOMY,RADICAL,W/NERVE SPARE,INCL ROBOTIC Bilateral 6/29/2016    Procedure: ROBOTIC BILATERAL PELVIC LYMPH NODE DISSECTION;  Surgeon: Mika Lugo MD;  Location: [facility];  Service: Urology    TONSILLECTOMY       Social History     Socioeconomic History    Marital status:      Spouse name: Not on file    Number of children: Not on file    Years of education: Not on file    Highest education level: Not on file   Occupational History    Not on file   Tobacco Use    Smoking status: Former    Smokeless tobacco: Never   Substance and Sexual Activity    Alcohol use: Not on file    Drug use: Not on file    Sexual activity: Not on file   Other Topics Concern    Not on file   Social History Narrative    Not on file     Social Determinants of Health     Financial Resource Strain: Low Risk  (8/24/2022)    Received from [facility]    Overall Financial Resource Strain (CARDIA)   Food Insecurity: No Food Insecurity (8/24/2022)    Received from [facility]    Hunger Vital Sign     Worried About Running Out of Food in the Last Year: Never true     Ran Out of Food in the Last Year: Never true   Transportation Needs: No Transportation Needs (8/24/2022)    Received from [facility]    PRAPARE - Transportation   Physical Activity: Insufficiently Active (8/24/2022)    Received from [facility]    Exercise Vital Sign   Stress: No Stress Concern Present (8/24/2022)    Received from [facility]    [place] of Occupational Health - Occupational Stress Questionnaire     Feeling of  Stress : Not at all   Social Connections: Socially Integrated (8/24/2022)    Received from [facility]    Social Connection and Isolation Panel [NHANES]   Interpersonal Safety: Not At Risk (8/24/2022)    Received from [facility]    Humiliation, Afraid, Rape, and Kick questionnaire     Fear of Current or Ex-Partner: No     Emotionally Abused: No     Physically Abused: No     Sexually Abused: No   Housing Stability: Low Risk  (8/24/2022)    Received from [facility]    Housing Stability Vital Sign       O:  NAD  There were no vitals taken for this visit.    L hand:  FROM with some restriction of the DIP flexion  No swelling  Nttp over the A1 pulley at the flexor crease  +triggering of the middle finger in the office  Full strength with extension and flexion of the fingers  Nttp over the CMC, IP or mcp of the thumb  NVI    Narrative & Impression   3 views left hand radiographs 4/24/2024 1:18 PM     History: Left hand pain     Comparison: None available     Findings:     PA, oblique and lateral view(s) of the left hand were obtained.      No acute osseous abnormality.  No erosion.     Mild degenerative changes of the first carpometacarpal and triscaphe  joints.  Additional narrowing of the distal interphalangeal joint of  the second digit.      Soft tissue is unremarkable.                                                                      Impression:  1. No acute osseous abnormality.  2. Mild degenerative changes of the first CMC joint.  3. Mild to moderate degenerative changes.     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   Discussed that he can continue his current treatment  Modify  positions; invest in a heart rate monitor watch or one that he wears on his body  Topical voltaren gel 4x per day for a couple weeks to see if that helps since he doesn't want to have injections.  Offered CSI MSK US  guided trigger finger injections but he declines. Call to schedule if he changes his mind.     Tiffanie Best MD, CAQ, FACSM, CCD  [facility]  Sports Medicine and Bone Health  Team Physician;  Athletics          A:  Trigger fingers 3-5 LEFT    P:

## 2025-01-20 ENCOUNTER — TRANSFERRED RECORDS (OUTPATIENT)
Dept: HEALTH INFORMATION MANAGEMENT | Facility: CLINIC | Age: OVER 89
End: 2025-01-20
Payer: MEDICARE

## 2025-02-03 ENCOUNTER — TRANSFERRED RECORDS (OUTPATIENT)
Dept: HEALTH INFORMATION MANAGEMENT | Facility: CLINIC | Age: OVER 89
End: 2025-02-03
Payer: MEDICARE

## 2025-02-11 NOTE — TELEPHONE ENCOUNTER
Action 2025 Jtv 2:43 PM    Action Taken CSS sent an urgent request to [facility] for records and images from [facility] [place].      Action 25  11:31 AM LAITH   Action Taken  Records received from [facility] and faxed to urgent scanning.      MEDICAL RECORDS REQUEST   [facility]  Urology Clinic  [address] [place] [address]  PHONE: [phone]  Fax: [phone]        FUTURE VISIT INFORMATION                                                   Shailesh Hartman, : 1932 scheduled for future visit at [facility]    APPOINTMENT INFORMATION:  Date: 2025  Provider:  Jama Ray MD  Reason for Visit/Diagnosis: has a narrowed left ureter and hydronephrosis      RECORDS REQUESTED FOR VISIT                                                     NOTES  STATUS/DETAILS   OFFICE NOTE from other specialist pending YES, [facility]   MEDICATION LIST  yes   LABS     URINALYSIS (UA)  yes   IMAGES pending YES, [facility]  2025 -- US RENAL    [place]  2024, 2024 -- PET CT [facility]      PRE-VISIT CHECKLIST      Joint diagnostic appointment coordinated correctly          (ensure right order & amount of time) Yes   RECORD COLLECTION COMPLETE If no, please explain pending

## 2025-02-13 ENCOUNTER — PRE VISIT (OUTPATIENT)
Dept: UROLOGY | Facility: CLINIC | Age: OVER 89
End: 2025-02-13
Payer: MEDICARE

## 2025-02-13 NOTE — TELEPHONE ENCOUNTER
Reason for visit: Consult      Relevant information: Procedure scheduled 2/25/25    Records/imaging/labs/orders: Available in Epic    At Rooming: Standard    Mika Farmer, EMT-P  2/13/2025  1:38 PM

## 2025-02-21 ENCOUNTER — PRE VISIT (OUTPATIENT)
Dept: UROLOGY | Facility: CLINIC | Age: OVER 89
End: 2025-02-21

## 2025-02-21 PROCEDURE — 99000 SPECIMEN HANDLING OFFICE-LAB: CPT | Performed by: PATHOLOGY

## 2025-02-21 PROCEDURE — 87086 URINE CULTURE/COLONY COUNT: CPT | Performed by: UROLOGY

## 2025-02-24 ENCOUNTER — ANESTHESIA EVENT (OUTPATIENT)
Dept: SURGERY | Facility: CLINIC | Age: OVER 89
End: 2025-02-24
Payer: MEDICARE

## 2025-02-24 ENCOUNTER — TELEPHONE (OUTPATIENT)
Dept: UROLOGY | Facility: CLINIC | Age: OVER 89
End: 2025-02-24
Payer: MEDICARE

## 2025-02-24 RX ORDER — ACETAMINOPHEN 500 MG
500-1000 TABLET ORAL 3 TIMES DAILY
COMMUNITY

## 2025-02-24 NOTE — TELEPHONE ENCOUNTER
Pre Op Teaching Flowsheet       Pre and Post op Patient Education  Relevant Diagnosis:  Ureteral mass  Surgical procedure:  CYSTOURETEROSCOPY, possible biopsy, fulguration, stent   Teaching Topic:  Pre and post op teaching  Person Involved in teaching: Yes    Motivation Level:  Asks Questions: Yes  Eager to Learn: Yes  Cooperative: Yes  Receptive (willing/able to accept information):  Yes    Patient demonstrates understanding of the following:  Date of surgery:  2/25/25  Location of surgery:  [address] [place] [address]  History and Physical and any other testing necessary prior to surgery: Yes  Required time line for completion of History and Physical and any pre-op testing: Yes    Patient demonstrates understanding of the following:  Pre-op bowel prep:  N/A  Pre-op showering/scrub information with PCMX Soap: Yes  Blood thinner medications discussed and when to stop (if applicable):  N/A  Discussed no visitor's at this time due to increase Covid-19 cases and how we need to make sure everyone stays safe.    Infection Prevention:   Patient demonstrates understanding of the following:  Surgical procedure site care taught: Yes  Signs and symptoms of infection taught: Yes      Post-op follow-up:  Discussed how to contact the hospital, nurse, and clinic scheduling staff if necessary. (See packet information)    Instructional materials used/given/mailed:  [place] Surgery Packet, post op teaching sheet, Map, Soap, and with the arrival/location information to come closer to the surgery date.    Surgical instructions packet given to patient in office:  N/A    Follow up: Discussed arranging for someone to drive you home. ( No public transportation)  Someone needed to stay the first twenty hours after surgery: Yes    Lula Shaikh RN, BSN  [facility]

## 2025-02-25 ENCOUNTER — APPOINTMENT (OUTPATIENT)
Dept: GENERAL RADIOLOGY | Facility: CLINIC | Age: OVER 89
End: 2025-02-25
Attending: UROLOGY
Payer: MEDICARE

## 2025-02-25 ENCOUNTER — ANESTHESIA (OUTPATIENT)
Dept: SURGERY | Facility: CLINIC | Age: OVER 89
End: 2025-02-25
Payer: MEDICARE

## 2025-02-25 ENCOUNTER — HOSPITAL ENCOUNTER (OUTPATIENT)
Facility: CLINIC | Age: OVER 89
Discharge: HOME | End: 2025-02-25
Attending: UROLOGY | Admitting: UROLOGY
Payer: MEDICARE

## 2025-02-25 VITALS
HEART RATE: 71 BPM | SYSTOLIC BLOOD PRESSURE: 163 MMHG | WEIGHT: 196 LBS | TEMPERATURE: 97.5 F | BODY MASS INDEX: 29.7 KG/M2 | DIASTOLIC BLOOD PRESSURE: 74 MMHG | RESPIRATION RATE: 17 BRPM | HEIGHT: 68 IN | OXYGEN SATURATION: 98 %

## 2025-02-25 DIAGNOSIS — N13.5 URETER, STRICTURE: Primary | ICD-10-CM

## 2025-02-25 DIAGNOSIS — N28.89 URETERAL MASS: Primary | ICD-10-CM

## 2025-02-25 PROCEDURE — 74420 UROGRAPHY RTRGR +-KUB: CPT | Mod: 26 | Performed by: UROLOGY

## 2025-02-25 PROCEDURE — 255N000002 HC RX 255 OP 636: Performed by: UROLOGY

## 2025-02-25 PROCEDURE — 360N000082 HC SURGERY LEVEL 2 W/ FLUORO, PER MIN: Performed by: UROLOGY

## 2025-02-25 PROCEDURE — 250N000011 HC RX IP 250 OP 636: Performed by: INTERNAL MEDICINE

## 2025-02-25 PROCEDURE — 88112 CYTOPATH CELL ENHANCE TECH: CPT | Mod: TC | Performed by: UROLOGY

## 2025-02-25 PROCEDURE — 250N000013 HC RX MED GY IP 250 OP 250 PS 637: Performed by: UROLOGY

## 2025-02-25 PROCEDURE — 710N000010 HC RECOVERY PHASE 1, LEVEL 2, PER MIN: Performed by: UROLOGY

## 2025-02-25 PROCEDURE — 999N000179 XR SURGERY CARM FLUORO LESS THAN 5 MIN W STILLS

## 2025-02-25 PROCEDURE — 250N000025 HC SEVOFLURANE, PER MIN: Performed by: UROLOGY

## 2025-02-25 PROCEDURE — 52354 CYSTOURETERO W/BIOPSY: CPT | Mod: LT | Performed by: UROLOGY

## 2025-02-25 PROCEDURE — 272N000001 HC OR GENERAL SUPPLY STERILE: Performed by: UROLOGY

## 2025-02-25 PROCEDURE — C2625 STENT, NON-COR, TEM W/DEL SY: HCPCS | Performed by: UROLOGY

## 2025-02-25 PROCEDURE — 250N000009 HC RX 250: Performed by: INTERNAL MEDICINE

## 2025-02-25 PROCEDURE — 52332 CYSTOSCOPY AND TREATMENT: CPT | Mod: LT | Performed by: UROLOGY

## 2025-02-25 PROCEDURE — 88305 TISSUE EXAM BY PATHOLOGIST: CPT | Mod: 26 | Performed by: PATHOLOGY

## 2025-02-25 PROCEDURE — 370N000017 HC ANESTHESIA TECHNICAL FEE, PER MIN: Performed by: UROLOGY

## 2025-02-25 PROCEDURE — 710N000012 HC RECOVERY PHASE 2, PER MINUTE: Performed by: UROLOGY

## 2025-02-25 PROCEDURE — C1769 GUIDE WIRE: HCPCS | Performed by: UROLOGY

## 2025-02-25 PROCEDURE — 999N000141 HC STATISTIC PRE-PROCEDURE NURSING ASSESSMENT: Performed by: UROLOGY

## 2025-02-25 PROCEDURE — 88305 TISSUE EXAM BY PATHOLOGIST: CPT | Mod: TC | Performed by: UROLOGY

## 2025-02-25 PROCEDURE — 88112 CYTOPATH CELL ENHANCE TECH: CPT | Mod: 26 | Performed by: PATHOLOGY

## 2025-02-25 PROCEDURE — C1758 CATHETER, URETERAL: HCPCS | Performed by: UROLOGY

## 2025-02-25 PROCEDURE — 258N000003 HC RX IP 258 OP 636: Performed by: INTERNAL MEDICINE

## 2025-02-25 PROCEDURE — 250N000011 HC RX IP 250 OP 636: Performed by: UROLOGY

## 2025-02-25 DEVICE — AMPLATZ URETERAL STENT SET ULTRATHANE
Type: IMPLANTABLE DEVICE | Site: URETER | Status: FUNCTIONAL
Brand: AMPLATZ

## 2025-02-25 RX ORDER — LIDOCAINE 40 MG/G
CREAM TOPICAL
Status: DISCONTINUED | OUTPATIENT
Start: 2025-02-25 | End: 2025-02-25 | Stop reason: HOSPADM

## 2025-02-25 RX ORDER — OXYCODONE HYDROCHLORIDE 5 MG/1
5-10 TABLET ORAL EVERY 4 HOURS PRN
Qty: 6 TABLET | Refills: 0 | Status: SHIPPED | OUTPATIENT
Start: 2025-02-25

## 2025-02-25 RX ORDER — PROPOFOL 10 MG/ML
INJECTION, EMULSION INTRAVENOUS PRN
Status: DISCONTINUED | OUTPATIENT
Start: 2025-02-25 | End: 2025-02-25

## 2025-02-25 RX ORDER — OXYCODONE HYDROCHLORIDE 10 MG/1
10 TABLET ORAL
Status: DISCONTINUED | OUTPATIENT
Start: 2025-02-25 | End: 2025-02-25 | Stop reason: HOSPADM

## 2025-02-25 RX ORDER — NALOXONE HYDROCHLORIDE 0.4 MG/ML
0.1 INJECTION, SOLUTION INTRAMUSCULAR; INTRAVENOUS; SUBCUTANEOUS
Status: DISCONTINUED | OUTPATIENT
Start: 2025-02-25 | End: 2025-02-25 | Stop reason: HOSPADM

## 2025-02-25 RX ORDER — OXYCODONE HYDROCHLORIDE 5 MG/1
5 TABLET ORAL
Status: DISCONTINUED | OUTPATIENT
Start: 2025-02-25 | End: 2025-02-25 | Stop reason: HOSPADM

## 2025-02-25 RX ORDER — ONDANSETRON 2 MG/ML
4 INJECTION INTRAMUSCULAR; INTRAVENOUS EVERY 30 MIN PRN
Status: DISCONTINUED | OUTPATIENT
Start: 2025-02-25 | End: 2025-02-25 | Stop reason: HOSPADM

## 2025-02-25 RX ORDER — FENTANYL CITRATE 50 UG/ML
INJECTION, SOLUTION INTRAMUSCULAR; INTRAVENOUS PRN
Status: DISCONTINUED | OUTPATIENT
Start: 2025-02-25 | End: 2025-02-25

## 2025-02-25 RX ORDER — HYDROMORPHONE HCL IN WATER/PF 6 MG/30 ML
0.4 PATIENT CONTROLLED ANALGESIA SYRINGE INTRAVENOUS EVERY 5 MIN PRN
Status: DISCONTINUED | OUTPATIENT
Start: 2025-02-25 | End: 2025-02-25 | Stop reason: HOSPADM

## 2025-02-25 RX ORDER — TAMSULOSIN HYDROCHLORIDE 0.4 MG/1
0.4 CAPSULE ORAL DAILY PRN
Qty: 30 CAPSULE | Refills: 0 | Status: SHIPPED | OUTPATIENT
Start: 2025-02-25

## 2025-02-25 RX ORDER — ACETAMINOPHEN 325 MG/1
975 TABLET ORAL ONCE
Status: DISCONTINUED | OUTPATIENT
Start: 2025-02-25 | End: 2025-02-25 | Stop reason: HOSPADM

## 2025-02-25 RX ORDER — CEFAZOLIN SODIUM/WATER 2 G/20 ML
2 SYRINGE (ML) INTRAVENOUS
Status: COMPLETED | OUTPATIENT
Start: 2025-02-25 | End: 2025-02-25

## 2025-02-25 RX ORDER — PHENAZOPYRIDINE HYDROCHLORIDE 200 MG/1
200 TABLET, FILM COATED ORAL 3 TIMES DAILY PRN
Qty: 9 TABLET | Refills: 0 | Status: SHIPPED | OUTPATIENT
Start: 2025-02-25

## 2025-02-25 RX ORDER — ONDANSETRON 4 MG/1
4 TABLET, ORALLY DISINTEGRATING ORAL EVERY 30 MIN PRN
Status: DISCONTINUED | OUTPATIENT
Start: 2025-02-25 | End: 2025-02-25 | Stop reason: HOSPADM

## 2025-02-25 RX ORDER — DEXAMETHASONE SODIUM PHOSPHATE 4 MG/ML
INJECTION, SOLUTION INTRA-ARTICULAR; INTRALESIONAL; INTRAMUSCULAR; INTRAVENOUS; SOFT TISSUE PRN
Status: DISCONTINUED | OUTPATIENT
Start: 2025-02-25 | End: 2025-02-25

## 2025-02-25 RX ORDER — CEFAZOLIN SODIUM/WATER 2 G/20 ML
2 SYRINGE (ML) INTRAVENOUS SEE ADMIN INSTRUCTIONS
Status: DISCONTINUED | OUTPATIENT
Start: 2025-02-25 | End: 2025-02-25 | Stop reason: HOSPADM

## 2025-02-25 RX ORDER — DEXAMETHASONE SODIUM PHOSPHATE 4 MG/ML
4 INJECTION, SOLUTION INTRA-ARTICULAR; INTRALESIONAL; INTRAMUSCULAR; INTRAVENOUS; SOFT TISSUE
Status: DISCONTINUED | OUTPATIENT
Start: 2025-02-25 | End: 2025-02-25 | Stop reason: HOSPADM

## 2025-02-25 RX ORDER — ONDANSETRON 2 MG/ML
INJECTION INTRAMUSCULAR; INTRAVENOUS PRN
Status: DISCONTINUED | OUTPATIENT
Start: 2025-02-25 | End: 2025-02-25

## 2025-02-25 RX ORDER — IOPAMIDOL 510 MG/ML
INJECTION, SOLUTION INTRAVASCULAR PRN
Status: DISCONTINUED | OUTPATIENT
Start: 2025-02-25 | End: 2025-02-25 | Stop reason: HOSPADM

## 2025-02-25 RX ORDER — SODIUM CHLORIDE, SODIUM LACTATE, POTASSIUM CHLORIDE, CALCIUM CHLORIDE 600; 310; 30; 20 MG/100ML; MG/100ML; MG/100ML; MG/100ML
INJECTION, SOLUTION INTRAVENOUS CONTINUOUS PRN
Status: DISCONTINUED | OUTPATIENT
Start: 2025-02-25 | End: 2025-02-25

## 2025-02-25 RX ORDER — FENTANYL CITRATE 50 UG/ML
50 INJECTION, SOLUTION INTRAMUSCULAR; INTRAVENOUS EVERY 5 MIN PRN
Status: DISCONTINUED | OUTPATIENT
Start: 2025-02-25 | End: 2025-02-25 | Stop reason: HOSPADM

## 2025-02-25 RX ORDER — FENTANYL CITRATE 50 UG/ML
25 INJECTION, SOLUTION INTRAMUSCULAR; INTRAVENOUS EVERY 5 MIN PRN
Status: DISCONTINUED | OUTPATIENT
Start: 2025-02-25 | End: 2025-02-25 | Stop reason: HOSPADM

## 2025-02-25 RX ORDER — AMOXICILLIN 250 MG
1-2 CAPSULE ORAL 2 TIMES DAILY
Qty: 30 TABLET | Refills: 0 | Status: SHIPPED | OUTPATIENT
Start: 2025-02-25

## 2025-02-25 RX ORDER — TOLTERODINE TARTRATE 2 MG/1
2 TABLET, EXTENDED RELEASE ORAL EVERY 12 HOURS PRN
Qty: 30 TABLET | Refills: 0 | Status: SHIPPED | OUTPATIENT
Start: 2025-02-25

## 2025-02-25 RX ORDER — ACETAMINOPHEN 650 MG/1
650 SUPPOSITORY RECTAL ONCE
Status: COMPLETED | OUTPATIENT
Start: 2025-02-25 | End: 2025-02-25

## 2025-02-25 RX ORDER — ACETAMINOPHEN 325 MG/1
975 TABLET ORAL ONCE
Status: COMPLETED | OUTPATIENT
Start: 2025-02-25 | End: 2025-02-25

## 2025-02-25 RX ORDER — HYDROMORPHONE HCL IN WATER/PF 6 MG/30 ML
0.2 PATIENT CONTROLLED ANALGESIA SYRINGE INTRAVENOUS EVERY 5 MIN PRN
Status: DISCONTINUED | OUTPATIENT
Start: 2025-02-25 | End: 2025-02-25 | Stop reason: HOSPADM

## 2025-02-25 RX ORDER — SODIUM CHLORIDE, SODIUM LACTATE, POTASSIUM CHLORIDE, CALCIUM CHLORIDE 600; 310; 30; 20 MG/100ML; MG/100ML; MG/100ML; MG/100ML
INJECTION, SOLUTION INTRAVENOUS CONTINUOUS
Status: DISCONTINUED | OUTPATIENT
Start: 2025-02-25 | End: 2025-02-25 | Stop reason: HOSPADM

## 2025-02-25 RX ORDER — LIDOCAINE HYDROCHLORIDE 20 MG/ML
INJECTION, SOLUTION INFILTRATION; PERINEURAL PRN
Status: DISCONTINUED | OUTPATIENT
Start: 2025-02-25 | End: 2025-02-25

## 2025-02-25 RX ADMIN — FENTANYL CITRATE 50 MCG: 50 INJECTION INTRAMUSCULAR; INTRAVENOUS at 08:17

## 2025-02-25 RX ADMIN — SODIUM CHLORIDE, POTASSIUM CHLORIDE, SODIUM LACTATE AND CALCIUM CHLORIDE: 600; 310; 30; 20 INJECTION, SOLUTION INTRAVENOUS at 08:09

## 2025-02-25 RX ADMIN — Medication 30 MG: at 08:22

## 2025-02-25 RX ADMIN — Medication 2 G: at 08:13

## 2025-02-25 RX ADMIN — PHENYLEPHRINE HYDROCHLORIDE 100 MCG: 10 INJECTION INTRAVENOUS at 08:22

## 2025-02-25 RX ADMIN — SUCCINYLCHOLINE CHLORIDE 80 MG: 20 INJECTION, SOLUTION INTRAMUSCULAR; INTRAVENOUS; PARENTERAL at 08:19

## 2025-02-25 RX ADMIN — ACETAMINOPHEN 975 MG: 325 TABLET, FILM COATED ORAL at 07:08

## 2025-02-25 RX ADMIN — FENTANYL CITRATE 50 MCG: 50 INJECTION INTRAMUSCULAR; INTRAVENOUS at 08:48

## 2025-02-25 RX ADMIN — DEXAMETHASONE SODIUM PHOSPHATE 4 MG: 4 INJECTION, SOLUTION INTRA-ARTICULAR; INTRALESIONAL; INTRAMUSCULAR; INTRAVENOUS; SOFT TISSUE at 08:20

## 2025-02-25 RX ADMIN — PROPOFOL 130 MG: 10 INJECTION, EMULSION INTRAVENOUS at 08:19

## 2025-02-25 RX ADMIN — FENTANYL CITRATE 50 MCG: 50 INJECTION, SOLUTION INTRAMUSCULAR; INTRAVENOUS at 10:31

## 2025-02-25 RX ADMIN — ONDANSETRON 4 MG: 2 INJECTION INTRAMUSCULAR; INTRAVENOUS at 08:22

## 2025-02-25 RX ADMIN — FENTANYL CITRATE 50 MCG: 50 INJECTION, SOLUTION INTRAMUSCULAR; INTRAVENOUS at 10:46

## 2025-02-25 RX ADMIN — LIDOCAINE HYDROCHLORIDE 80 MG: 20 INJECTION, SOLUTION INFILTRATION; PERINEURAL at 08:19

## 2025-02-25 RX ADMIN — PHENYLEPHRINE HYDROCHLORIDE 100 MCG: 10 INJECTION INTRAVENOUS at 09:15

## 2025-02-25 RX ADMIN — Medication 10 MG: at 08:58

## 2025-02-25 RX ADMIN — Medication 100 MG: at 09:50

## 2025-02-25 ASSESSMENT — ENCOUNTER SYMPTOMS: DYSRHYTHMIAS: 1

## 2025-02-25 ASSESSMENT — ACTIVITIES OF DAILY LIVING (ADL)
ADLS_ACUITY_SCORE: 24
ADLS_ACUITY_SCORE: 24
ADLS_ACUITY_SCORE: 26
ADLS_ACUITY_SCORE: 24

## 2025-02-25 ASSESSMENT — LIFESTYLE VARIABLES: TOBACCO_USE: 1

## 2025-02-25 NOTE — PROGRESS NOTES
PAR Doctor messaged asking for something for patients bladder spasms. Pt also overed ice chips and PO fluids but declined at this time. Denies any N/V currently.

## 2025-02-25 NOTE — ANESTHESIA PREPROCEDURE EVALUATION
Anesthesia Pre-Procedure Evaluation    Patient: Shailesh Hartman   MRN: [id number] : 1932        Procedure : Procedure(s):  CYSTOURETEROSCOPY, possible biopsy, fulguration, stent          Past Medical History:   Diagnosis Date    Pacemaker       Past Surgical History:   Procedure Laterality Date    ADENOIDECTOMY      BACK SURGERY      HIP SURGERY      HIP SURGERY      IR LUMBAR EPIDURAL STEROID INJECTION  2005    JOINT REPLACEMENT Right     QUYNH    NE LAP,PROSTATECTOMY,RADICAL,W/NERVE SPARE,INCL ROBOTIC Bilateral 2016    Procedure: ROBOTIC BILATERAL PELVIC LYMPH NODE DISSECTION;  Surgeon: Mika Lugo MD;  Location: [facility];  Service: Urology    TONSILLECTOMY        Allergies   Allergen Reactions    Seasonal Allergies Other (See Comments)     Running nose    Wheat Other (See Comments) and Unknown     Runny nose  Runny nose        Social History     Tobacco Use    Smoking status: Former    Smokeless tobacco: Never   Substance Use Topics    Alcohol use: Not on file      Wt Readings from Last 1 Encounters:   25 88.9 kg (196 lb)        Anesthesia Evaluation   Pt has had prior anesthetic.     No history of anesthetic complications       ROS/MED HX  ENT/Pulmonary:     (+)                tobacco use, Past use,                       Neurologic:       Cardiovascular: Comment: Pacemaker 2025  Normal dual chamber pacemaker function. This is a [employer] transmission. The device was interrogated to assess data and settings. No atrial or ventricular high rate episodes noted. RV paced 99%. Battery voltage WNL. [employer] transmission in 3 months. Please contact [phone] if any questions.       (+)  hypertension- -   -  - -           HUERTA.   pacemaker, Reason placed: bradycardia.   - Patient is dependent on pacemaker.      dysrhythmias, a-flutter,        Previous cardiac testing (2024)   Echo: Date: Results:    Stress Test:  Date: Results:  STRESS IMAGES:Dobutamine  "stress echocardiogram negative for myocardial ischemia. No regional wall motion abnormalities with stress. Ejection fraction response from 60% at rest to 70% at peak stress. Left ventricular end-systolic volume decreased with   stress.     ECG Reviewed:  Date: Results:    Cath:  Date: Results:      METS/Exercise Tolerance: 3 - Able to walk 1-2 blocks without stopping    Hematologic:       Musculoskeletal: Comment: Low back pain      GI/Hepatic:     (+) GERD,  esophageal disease,                 Renal/Genitourinary: Comment: Primary prostate cancer with metastasis from prostate to other site   Uretal mass    (+) renal disease,             Endo:       Psychiatric/Substance Use:       Infectious Disease:       Malignancy:   (+) Malignancy, History of Prostate.Prostate CA status post Radiation.      Other:      (+)  , H/O Chronic Pain,         Physical Exam    Airway        Mallampati: II   TM distance: > 3 FB   Neck ROM: full   Mouth opening: > 3 cm    Respiratory Devices and Support         Dental       (+) Multiple crowns, permanant bridges      Cardiovascular   cardiovascular exam normal          Pulmonary   pulmonary exam normal                OUTSIDE LABS:  CBC: No results found for: \"WBC\", \"HGB\", \"HCT\", \"PLT\"  BMP:   Lab Results   Component Value Date    CR 1.18 06/29/2016     COAGS: No results found for: \"PTT\", \"INR\", \"FIBR\"  POC: No results found for: \"BGM\", \"HCG\", \"HCGS\"  HEPATIC: No results found for: \"ALBUMIN\", \"PROTTOTAL\", \"ALT\", \"AST\", \"GGT\", \"ALKPHOS\", \"BILITOTAL\", \"BILIDIRECT\", \"JILLIAN\"  OTHER: No results found for: \"PH\", \"LACT\", \"A1C\", \"NAS\", \"PHOS\", \"MAG\", \"LIPASE\", \"AMYLASE\", \"TSH\", \"T4\", \"T3\", \"CRP\", \"SED\"    Anesthesia Plan    ASA Status:  3       Anesthesia Type: General.     - Airway: ETT   Induction: Intravenous, RSI.   Maintenance: Balanced.   Techniques and Equipment:     - Airway: Video-Laryngoscope       Consents    Anesthesia Plan(s) and associated risks, benefits, and realistic alternatives " "discussed. Questions answered and patient/representative(s) expressed understanding.     - Discussed: Risks, Benefits and Alternatives for BOTH SEDATION and the PROCEDURE were discussed     - Discussed with:  Patient            Postoperative Care    Pain management: IV analgesics, Oral pain medications, Multi-modal analgesia.   PONV prophylaxis: Ondansetron (or other 5HT-3), Dexamethasone or Solumedrol     Comments:               Eloisa Mai MD    I have reviewed the pertinent notes and labs in the chart from the past 30 days and (re)examined the patient.  Any updates or changes from those notes are reflected in this note.    Clinically Significant Risk Factors Present on Admission                   # Hypertension: Noted on problem list           # Overweight: Estimated body mass index is 29.8 kg/m  as calculated from the following:    Height as of this encounter: 1.727 m (5' 8\").    Weight as of this encounter: 88.9 kg (196 lb).                "

## 2025-02-25 NOTE — ANESTHESIA CARE TRANSFER NOTE
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    Anesthesia Type:   General     Note:    Oropharynx: oropharynx clear of all foreign objects and spontaneously breathing  Level of Consciousness: drowsy  Oxygen Supplementation: face mask  Level of Supplemental Oxygen (L/min / FiO2): 4  Independent Airway: airway patency satisfactory and stable  Dentition: dentition unchanged  Vital Signs Stable: post-procedure vital signs reviewed and stable  Report to RN Given: handoff report given  Patient transferred to: PACU    Handoff Report: Identifed the Patient, Identified the Reponsible Provider, Reviewed the pertinent medical history, Discussed the surgical course, Reviewed Intra-OP anesthesia mangement and issues during anesthesia, Set expectations for post-procedure period and Allowed opportunity for questions and acknowledgement of understanding      Vitals:  Vitals Value Taken Time   /75 02/25/25 1007   Temp     Pulse 79 02/25/25 1010   Resp 14 02/25/25 1010   SpO2 100 % 02/25/25 1010   Vitals shown include unfiled device data.    Electronically Signed By: NADIR Monroe CRNA  February 25, 2025  10:10 AM

## 2025-02-25 NOTE — PROGRESS NOTES
Page sent to device nurse regarding pacemaker at this time. Patient changing into hospital attire.

## 2025-02-25 NOTE — ANESTHESIA POSTPROCEDURE EVALUATION
Patient: Shailesh Hartman    Procedure: Procedure(s):  CYSTOURETEROSCOPY, left ureteral biopsy, stent placement left ureter       Anesthesia Type:  General    Note:  Disposition: Outpatient   Postop Pain Control: Uneventful            Sign Out: Well controlled pain   PONV: No   Neuro/Psych: Uneventful            Sign Out: Acceptable/Baseline neuro status   Airway/Respiratory: Uneventful            Sign Out: Acceptable/Baseline resp. status   CV/Hemodynamics: Uneventful            Sign Out: Acceptable CV status; No obvious hypovolemia; No obvious fluid overload   Other NRE: NONE   DID A NON-ROUTINE EVENT OCCUR? No           Last vitals:  Vitals Value Taken Time   /65 02/25/25 1145   Temp 36.3  C (97.4  F) 02/25/25 1130   Pulse 64 02/25/25 1149   Resp 22 02/25/25 1149   SpO2 96 % 02/25/25 1149   Vitals shown include unfiled device data.    Electronically Signed By: Shabbir Martínez MD  February 25, 2025  11:49 AM

## 2025-02-25 NOTE — PROGRESS NOTES
Oxybutynin received from pharmacy. Went to give to patient as discussed. Once medicatinos ready, patient declined needing to take. Marked ready for phase 2.

## 2025-02-25 NOTE — OP NOTE
PREOPERATIVE DIAGNOSIS:  Left Nephrolithiasis   POSTOPERATIVE DIAGNOSIS:  Same    PROCEDURES:  Cystoscopy.   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  The caliber the stricture was approximately 6-8 Solomon Islander.  Severe left hydronephrosis with ureteral dilation and tortuosity to the distal ureter    INDICATIONS:   Shailesh Hartman is a 89 year old male with a history of left hydronephrosis and urinary retention requiring a Barron catheter.  There is a question of a ureteral lesion on a recent CT urogram.  He is here today for ureteroscopy and is given informed consent for the procedure.    DESCRIPTION OF PROCEDURE:   After obtaining informed consent, the patient was brought to the operating room and placed in the supine position on the operating room table. All pressure points were adequately cushioned and pneumatic compression devices were applied to the patient's bilateral lower extremities. Appropriate preoperative antibiotics were administered. General endotracheal anesthesia was induced without difficulty. The patient was repositioned into the dorsal lithotomy position. The patient was then prepped and draped in the usual sterile fashion. A timeout was performed to confirm the correct patient, positioning, procedure, and laterality.   Procedure was initiated with insertion of a 22 F rigid cystoscope into the bladder. The urethra was normal without stricture. At this time a full cystoscopy was performed.  Urethra was open.  Prostate was open.  The bladder was of normal capacity, without tumors, stones, or  diverticuli, and the bilateral ureteral orifices were in the normal orthotopic position.  A sensor guidewire was passed up the left ureteral orifice with significant difficulty.  There is quite a bit of resistance to wire passage open I had to use a angled sensor wire to pass the wire up the ureter.  Overall this part of the procedure took approximately 25 minutes which is  20 minutes longer than what is typical.  A 5fr catheter was placed over the wire and a retrograde pyelogram was done.  The results of this can be found in the FINDINGS section.  A wire was then replaced and I exchanged the 5 Citizen of [place] catheter for a 10 Citizen of [place] dual-lumen catheter.  This passed with significant resistance but I was able to get a second Super Stiff wire in place.  Ureteroscopy was then started with the semirigid ureteroscope.  There was significant stricturing distally.  I was able to bypass this make my way up to the proximal ureter.  There were no ureteral lesions other than the narrowing in the distal ureter.  I then placed the second wire and exchanged the scope for a flexible but the view ureteroscope.  Full ureteroscopy of all the calyces and renal pelvis was undertaken.  There is quite a bit of hydronephrosis making visualization difficult additionally there was some blood and clot.  There were no tumors seen.  I then turned my attention to the biopsy portion of the procedure.  I exchanged the scope for the semirigid ureteroscope.  5 biopsies were taken using a chronic grasper and sent for permanent pathology.  Prior to doing this we also did take a specimen for cytology.    This was completed I replaced the second wire and then proceeded to place a 10 Citizen of [place] 26 cm double-J stent.  This was quite tedious and took approximately 10 minutes to pass up the ureter.  A 16 Citizen of [place] Barron catheter and had good clear urine output.  The patient was awakened from anesthesia, transferred to the PACU in stable condition.    PLAN:  Return to  clinic in 2 weeks for pathology check.  I will plan for a renogram prior to this time as well.  This is to determine if the left kidney has any remaining function.

## 2025-02-25 NOTE — BRIEF OP NOTE
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]  Post-operative diagnosis Ureter stricture    Procedure: CYSTOURETEROSCOPY, left ureteral biopsy, stent placement left ureter, Left - Urethra    Surgeon: Surgeons and Role:     * Jama Ray MD - Primary  Anesthesia: General   Estimated Blood Loss: 0     Drains:16 fr case, 10 fr left stent  Specimens:   ID Type Source Tests Collected by Time Destination   1 :  Urine Urine, Clean Catch NON-GYNECOLOGIC CYTOLOGY, FISH BLADDER CANCER Jama Ray MD 2/25/2025  9:23 AM    2 : left distal ureter Tissue Ureter, Left SURGICAL PATHOLOGY EXAM Jama Ray MD 2/25/2025  9:35 AM      Findings:   Left stricture  Complications: None.  Implants:   Implant Name Type Inv. Item Serial No.  Lot No. LRB No. Used Action   STENT AMPLATZ URETERAL [id number] - [id number] Stent STENT AMPLATZ URETERAL [id number]  [employer] [id number] Left 1 Implanted

## 2025-02-25 NOTE — ANESTHESIA PROCEDURE NOTES
Airway       Patient location during procedure: OR       Procedure Start/Stop Times: 2/25/2025 8:21 AM  Staff -        CRNA: Danya Mercado APRN CRNA       Performed By: CRNA  Consent for Airway        Urgency: elective  Indications and Patient Condition       Indications for airway management: leticia-procedural       Induction type:RSI       Mask difficulty assessment: 0 - not attempted    Final Airway Details       Final airway type: endotracheal airway       Successful airway: ETT - single  Endotracheal Airway Details        ETT size (mm): 7.5       Cuffed: yes       Successful intubation technique: video laryngoscopy       VL Blade Size: Glidescope 3       Grade View of Cords: 1       Adjucts: stylet       Position: Right       Measured from: lips       Secured at (cm): 23       Bite block used: None    Post intubation assessment        Placement verified by: capnometry, equal breath sounds and chest rise        Number of attempts at approach: 1       Number of other approaches attempted: 0       Secured with: pink tape       Ease of procedure: easy       Dentition: Intact and Unchanged    Medication(s) Administered   Medication Administration Time: 2/25/2025 8:21 AM

## 2025-02-26 ENCOUNTER — DOCUMENTATION ONLY (OUTPATIENT)
Dept: UROLOGY | Facility: CLINIC | Age: OVER 89
End: 2025-02-26
Payer: MEDICARE

## 2025-02-26 ENCOUNTER — TELEPHONE (OUTPATIENT)
Dept: UROLOGY | Facility: CLINIC | Age: OVER 89
End: 2025-02-26
Payer: MEDICARE

## 2025-02-26 LAB
PATH REPORT.COMMENTS IMP SPEC: ABNORMAL
PATH REPORT.COMMENTS IMP SPEC: ABNORMAL
PATH REPORT.COMMENTS IMP SPEC: YES
PATH REPORT.FINAL DX SPEC: ABNORMAL
PATH REPORT.GROSS SPEC: ABNORMAL
PATH REPORT.MICROSCOPIC SPEC OTHER STN: ABNORMAL
PATH REPORT.RELEVANT HX SPEC: ABNORMAL

## 2025-02-26 NOTE — RESULT ENCOUNTER NOTE
Randall Casillasjohannezane,   Your cytology came back atypical.  This is a nonspecific finding and not definitive on whether there is or is not cancer.    MIKE Ray MD

## 2025-02-26 NOTE — TELEPHONE ENCOUNTER
Patient confirmed scheduled appointment:  Date: 03/4/25  Time: 2:00pm  Visit type: Imaging   Provider: Imaging   Location: [place]  Testing/imaging: lasix renogram   Additional notes:

## 2025-02-26 NOTE — PROGRESS NOTES
Sent request to scheduling to assist patient in scheduling a lasix renogram prior to follow up on 3/14.    Lula Shaikh RN, BSN  [facility] Urology

## 2025-02-27 LAB
PATH REPORT.COMMENTS IMP SPEC: NORMAL
PATH REPORT.FINAL DX SPEC: NORMAL
PATH REPORT.GROSS SPEC: NORMAL
PATH REPORT.MICROSCOPIC SPEC OTHER STN: NORMAL
PATH REPORT.RELEVANT HX SPEC: NORMAL
PHOTO IMAGE: NORMAL

## 2025-02-28 NOTE — RESULT ENCOUNTER NOTE
Mr. Hartman,   Good news.  There is no evidence of any cancer on the biopsies.  There was significant crush artifact of the tissue in this made it difficult to have a perfect diagnosis.  However, we did not see any tumor on the ureteroscopy either.  We could repeat the biopsy but I do not see a way to get better tissue specimens than what we achieve this time.  We can discuss this further at your upcoming visit but for right now my conclusion is that there is no evidence of malignancy or cancer at this time.    MIKE Ray MD

## 2025-03-04 ENCOUNTER — HOSPITAL ENCOUNTER (OUTPATIENT)
Dept: NUCLEAR MEDICINE | Facility: CLINIC | Age: OVER 89
Setting detail: NUCLEAR MEDICINE
Discharge: HOME | End: 2025-03-04
Attending: UROLOGY
Payer: MEDICARE

## 2025-03-04 DIAGNOSIS — N28.89 URETERAL MASS: ICD-10-CM

## 2025-03-04 PROCEDURE — 78708 K FLOW/FUNCT IMAGE W/DRUG: CPT

## 2025-03-04 PROCEDURE — A9562 TC99M MERTIATIDE: HCPCS | Performed by: UROLOGY

## 2025-03-04 PROCEDURE — 343N000001 HC RX 343 MED OP 636: Performed by: UROLOGY

## 2025-03-04 PROCEDURE — 250N000011 HC RX IP 250 OP 636: Performed by: UROLOGY

## 2025-03-04 RX ORDER — FUROSEMIDE 10 MG/ML
40 INJECTION INTRAMUSCULAR; INTRAVENOUS ONCE
Status: COMPLETED | OUTPATIENT
Start: 2025-03-04 | End: 2025-03-04

## 2025-03-04 RX ADMIN — TECHNESCAN TC 99M MERTIATIDE 8.72 MILLICURIE: 1 INJECTION, POWDER, LYOPHILIZED, FOR SOLUTION INTRAVENOUS at 14:10

## 2025-03-04 RX ADMIN — FUROSEMIDE 40 MG: 10 INJECTION, SOLUTION INTRAVENOUS at 14:18

## 2025-03-13 ENCOUNTER — PRE VISIT (OUTPATIENT)
Dept: UROLOGY | Facility: CLINIC | Age: OVER 89
End: 2025-03-13
Payer: MEDICARE

## 2025-03-13 NOTE — TELEPHONE ENCOUNTER
Reason for visit: post-surgery follow-up (cystoureteroscopy)    Relevant information: surgery on 2/25/2025    Records/imaging/labs/order:   -available in Epic  -renogram with lasix on 3/4/2025    Patient called: no need for call    At rooming: standard rooming      Danny Martinez  3/13/2025  12:51 PM

## 2025-03-26 ENCOUNTER — OFFICE VISIT (OUTPATIENT)
Dept: UROLOGY | Facility: CLINIC | Age: OVER 89
End: 2025-03-26
Payer: MEDICARE

## 2025-03-26 DIAGNOSIS — Z46.6 URINARY CATHETER (FOLEY) CHANGE REQUIRED: Primary | ICD-10-CM

## 2025-03-26 DIAGNOSIS — R33.9 URINARY RETENTION: ICD-10-CM

## 2025-03-26 RX ORDER — LIDOCAINE HYDROCHLORIDE 20 MG/ML
JELLY TOPICAL ONCE
Status: COMPLETED | OUTPATIENT
Start: 2025-03-26 | End: 2025-03-26

## 2025-03-26 RX ORDER — CIPROFLOXACIN 500 MG/1
500 TABLET, FILM COATED ORAL ONCE
Status: COMPLETED | OUTPATIENT
Start: 2025-03-26 | End: 2025-03-26

## 2025-03-26 RX ADMIN — LIDOCAINE HYDROCHLORIDE: 20 JELLY TOPICAL at 13:34

## 2025-03-26 RX ADMIN — CIPROFLOXACIN 500 MG: 500 TABLET, FILM COATED ORAL at 13:15

## 2025-03-26 NOTE — PROGRESS NOTES
Shailesh Hartman comes into clinic today at the request of Dr Ray Ordering Provider for Catheter Change.      Catheter insertion documentation on 3/26/2025:    Shailesh Hartman presents to the clinic for catheter insertion.  Reason for insertion: urinary retention  Order has been verified. yes  Catheter successfully inserted into the urethral meatus in the usual sterile fashion without immediate complication.  Type of catheter placed: 16 Tajik indwelling catheter  Urine is yellow in color.  Urine returned   Balloon was filled with 8 mL's of normal saline.  Securement device placed for the catheter.  The patient tolerated the procedure and was instructed to return in one month for exchange     Cipro 500 mg given per protocol    This service provided today was under the supervising provider of the day Josh Ray PA-C, who was available if needed.    Ginger Galvez, RN, BSN

## 2025-03-31 ENCOUNTER — OFFICE VISIT (OUTPATIENT)
Dept: UROLOGY | Facility: CLINIC | Age: OVER 89
End: 2025-03-31
Payer: MEDICARE

## 2025-03-31 ENCOUNTER — TELEPHONE (OUTPATIENT)
Dept: UROLOGY | Facility: CLINIC | Age: OVER 89
End: 2025-03-31

## 2025-03-31 DIAGNOSIS — R33.9 URINARY RETENTION: Primary | ICD-10-CM

## 2025-03-31 PROCEDURE — 99207 PR NO CHARGE NURSE ONLY: CPT

## 2025-03-31 RX ORDER — SULFAMETHOXAZOLE AND TRIMETHOPRIM 800; 160 MG/1; MG/1
1 TABLET ORAL ONCE
Status: ACTIVE | OUTPATIENT
Start: 2025-03-31

## 2025-03-31 NOTE — PROGRESS NOTES
Shailesh Hartman comes into clinic today at the request of Nik Herr with the diagnosis of urinary retention  for a catheter exchange .    Order has been verified: Yes.    Allergies   Allergen Reactions    Seasonal Allergies Other (See Comments)     Running nose    Wheat Other (See Comments) and Unknown     Runny nose  Runny nose         Patient came in today at the request of patricia MATTHEW.     Patient came in today for an adjustment of his catheter stat lock placement.     Patient expressed relief after stat lock was adjusted.       Writer did not take out catheter / replace catcher.     Patient did tolerate procedure well.     Patient instructed as to where to call or go for pain, fever, leakage, or decreased urine flow.     This service provided today was under the direct supervision of Mika Perea, who was available if needed.    Opal Kwan   3/31/2025  8:59 AM

## 2025-03-31 NOTE — TELEPHONE ENCOUNTER
Patient is having discomfort with the catheter pulling. Patient was in clinic today to have the catheter repositioned on his leg due to pulling. He says they moved his stat lock higher on his thigh which helped. Patient had his catheter exchanged 3/26/25 by this writer. Patient extremely anxious about it.   He will return in April for catheter change  If he still have discomfort tomorrow he will mention to the [place] staff when he sees Dr Mandujano.    Ginger Galvez, RN, BSN, PHN  Care Coordinator Urology  [facility] [place]  U[facility]  [phone]

## 2025-04-04 ENCOUNTER — OFFICE VISIT (OUTPATIENT)
Dept: UROLOGY | Facility: CLINIC | Age: OVER 89
End: 2025-04-04
Payer: MEDICARE

## 2025-04-04 DIAGNOSIS — L30.9 ECZEMA: Primary | ICD-10-CM

## 2025-04-04 PROCEDURE — 99211 OFF/OP EST MAY X REQ PHY/QHP: CPT

## 2025-04-04 NOTE — PROGRESS NOTES
Shailesh Hartman comes into clinic today at the request of Dr Ray Ordering Provider for check catheter.    Patient's catheter and the statlock pulling. Replaced stat lock and provided education on how to apply without tension. Patient's wife takes care of the catheter. Patient's wife voiced understanding.      This service provided today was under the supervising provider of the day Josh Ray PA-C, who was available if needed.    Ginger Galvez, RN, BSN

## 2025-04-08 ENCOUNTER — OFFICE VISIT (OUTPATIENT)
Dept: DERMATOLOGY | Facility: CLINIC | Age: OVER 89
End: 2025-04-08
Payer: MEDICARE

## 2025-04-08 DIAGNOSIS — L21.9 DERMATITIS, SEBORRHEIC: Primary | ICD-10-CM

## 2025-04-08 DIAGNOSIS — L73.9 FOLLICULITIS: ICD-10-CM

## 2025-04-08 DIAGNOSIS — L30.9 ECZEMA: ICD-10-CM

## 2025-04-08 DIAGNOSIS — L30.8 OTHER ECZEMA: ICD-10-CM

## 2025-04-08 PROCEDURE — 99204 OFFICE O/P NEW MOD 45 MIN: CPT | Mod: GC | Performed by: DERMATOLOGY

## 2025-04-08 PROCEDURE — 1125F AMNT PAIN NOTED PAIN PRSNT: CPT | Performed by: DERMATOLOGY

## 2025-04-08 RX ORDER — TRIAMCINOLONE ACETONIDE 1 MG/G
OINTMENT TOPICAL 2 TIMES DAILY
Qty: 80 G | Refills: 3 | Status: SHIPPED | OUTPATIENT
Start: 2025-04-08

## 2025-04-08 RX ORDER — CLINDAMYCIN PHOSPHATE 10 UG/ML
LOTION TOPICAL 2 TIMES DAILY
Qty: 60 ML | Refills: 3 | Status: SHIPPED | OUTPATIENT
Start: 2025-04-08

## 2025-04-08 RX ORDER — KETOCONAZOLE 20 MG/ML
SHAMPOO, SUSPENSION TOPICAL
Qty: 120 ML | Refills: 3 | Status: SHIPPED | OUTPATIENT
Start: 2025-04-08

## 2025-04-08 ASSESSMENT — PAIN SCALES - GENERAL: PAINLEVEL_OUTOF10: MILD PAIN (2)

## 2025-04-08 NOTE — PATIENT INSTRUCTIONS
- Use Eucerin on the skin twice per day to keep moisture within the skin  - For the R lower leg, reach out to urology to see if they can change the material of the bag  - For the body, where there are red and itchy bumps, use triamcinolone ointment twice per day until the rash clears (you can restart at any time)  - For the back, apply clindamycin lotion to the pink bumps of the back   - For the itch across the back, use BPO wash in the shower while washing  - For the scalp, use ketoconazole shampoo on the scalp and beard three times a week

## 2025-04-08 NOTE — Clinical Note
4/8/2025       RE: Shailesh Hartman  [address] [facility] [address]     Dear Colleague,    Thank you for referring your patient, Shailesh Hartman, to the [facility] DERMATOLOGY CLINIC [place] at [facility]. Please see a copy of my visit note below.    [facility] Dermatology Note  Encounter Date: Apr 8, 2025  Office Visit     Dermatology Problem List:  1. History of skin cancer  - BCC, nodular-left malar cheek MOHS 2/2024  - BCC, left posterior forearm-ED&C 2/2024  - right temple, MOHs elsewhere 2022  - right lower eyelid, MOHs elsewhere 2008       ____________________________________________    Assessment & Plan:    # {Diagnosesderm:953573}.   {kkplans:453434}   - ***     # {Diagnosesderm:850995}.   {kkplans:683238}   - ***     Procedures Performed:   {bsproceduresnotes:087735}  {bsproceduresnotes:830241}    Follow-up: {kkfollowup:446002}    Staff and Scribe:     Scribe Disclosure:   I, Jojo Caceres, am serving as a scribe; to document services personally performed by Sumit Hutton MD -based on data collection and the provider's statements to me.     Provider Disclosure:  I agree with above History, Review of Systems, Physical exam and Plan.  I have reviewed the content of the documentation and have edited it as needed. I have personally performed the services documented here and the documentation accurately represents those services and the decisions I have made.      Electronically signed by:  ***     ***  ____________________________________________    CC: No chief complaint on file.    HPI:  Mr. Shailesh Hartman is a(n) 89 year old male who presents today as a new patient for ***  Last seen 5/15/24 at [facility] by Dr. Blank Connell for skin cancer screening. 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      Patient is otherwise feeling well, without  additional skin concerns.    Labs Reviewed:  ***N/A    Physical Exam:  Vitals: There were no vitals taken for this visit.  SKIN: {kkSkinExam:815689}  - ***  - {Skin Exam Derm:952148}.   - {Skin Exam Derm:238410}.   - {Skin Exam Derm:485371}.   - No other lesions of concern on areas examined.     Medications:  Current Outpatient Medications   Medication Sig Dispense Refill    acetaminophen (TYLENOL) 500 MG tablet Take 500-1,000 mg by mouth 3 times daily.      allopurinol (ZYLOPRIM) 300 MG tablet Take 300 mg by mouth daily      bisacodyl (DULCOLAX) 5 MG EC tablet Take 2 tablets at 3 pm the day before your procedure. If your procedure is before 11 am, take 2 additional tablets at 11 pm. If your procedure is after 11 am, take 2 additional tablets at 6 am. For additional instructions refer to your colonoscopy prep instructions. 4 tablet 0    ciprofloxacin (CIPRO) 250 MG tablet Take 250 mg by mouth 2 times daily. (Patient not taking: Reported on 4/1/2025)      diphenhydrAMINE-acetaminophen (TYLENOL PM)  MG tablet Take 1 tablet by mouth nightly as needed for sleep.      hydrochlorothiazide (MICROZIDE) 12.5 MG capsule Take 12.5 mg by mouth daily (Patient not taking: Reported on 4/1/2025)      lisinopril (PRINIVIL,ZESTRIL) 20 MG tablet Take 20 mg by mouth 2 times daily      melatonin 3 MG tablet Take 1.5 mg by mouth nightly as needed for sleep.      Multiple Vitamin (MULTI-VITAMINS) TABS 1 tab by mouth daily      oxyCODONE (ROXICODONE) 5 MG tablet Take 1-2 tablets (5-10 mg) by mouth every 4 hours as needed for moderate to severe pain. (Patient not taking: Reported on 4/1/2025) 6 tablet 0    phenazopyridine (PYRIDIUM) 200 MG tablet Take 1 tablet (200 mg) by mouth 3 times daily as needed for irritation. Take for bladder pain 9 tablet 0    potassium chloride ER (K-TAB/KLOR-CON) 10 MEQ CR tablet TAKES 10 MEQ BID  1    senna-docusate (SENOKOT-S/PERICOLACE) 8.6-50 MG tablet Take 1-2 tablets by mouth 2 times daily. 30 tablet  0    simvastatin (ZOCOR) 20 MG tablet Take 1 tablet by mouth At Bedtime      tamsulosin (FLOMAX) 0.4 MG capsule Take 1 capsule (0.4 mg) by mouth daily as needed. Take for stent discomfort or for kidney stone passage 30 capsule 0    tolterodine (DETROL) 2 MG tablet Take 1 tablet (2 mg) by mouth every 12 hours as needed for incontinence (Spasms). 30 tablet 0    triamterene-HCTZ (DYAZIDE) 37.5-25 MG capsule TK 1 C PO D  3     Current Facility-Administered Medications   Medication Dose Route Frequency Provider Last Rate Last Admin    sulfamethoxazole-trimethoprim (BACTRIM DS) 800-160 MG per tablet 1 tablet  1 tablet Oral Once Jama Ray MD          Past Medical History:   Patient Active Problem List   Diagnosis    Achilles tendonitis    Family history of colon cancer    Mechanical low back pain    Primary prostate cancer with metastasis from prostate to other site (H)    Malignant neoplasm of prostate (H)    Primary malignant neoplasm of prostate (H)    Hyperlipidemia    Gout, arthritis    Essential hypertension, benign    Elevated prostate specific antigen (PSA)    Cardiac conduction disorder    Bradycardia    Typical atrial flutter (H)    Senile purpura    Stage 3b chronic kidney disease (H)    Stage 3a chronic kidney disease (H)     Past Medical History:   Diagnosis Date    Pacemaker         CC Jama Ray MD  [address] [place] [phone] on close of this encounter.     [facility] Dermatology Note  Encounter Date: Apr 8, 2025  Office Visit     Dermatology Problem List:  1. History of skin cancer  - BCC, nodular-left malar cheek MOHS 2/2024  - BCC, left posterior forearm-ED&C 2/2024  - right temple, MOHs elsewhere 2022  - right lower eyelid, MOHs elsewhere 2008   ____________________________________________    Assessment & Plan:    # {Diagnosesderm:265198}.   {kkplans:575501}   - ***     # {Diagnosesderm:000994}.   {kkplans:485365}   - ***     Procedures Performed:    {bsproceduresnotes:368547}  {bsproceduresnotes:654193}    Follow-up: {kkfollowup:499959}    Staff and Scribe:   Ayaz Young MD  Staff: Dr. Hutton    Scribe Disclosure:   I, Jojo Caceres, am serving as a scribe; to document services personally performed by Sumit Hutton MD -based on data collection and the provider's statements to me.     Provider Disclosure:  I agree with above History, Review of Systems, Physical exam and Plan.  I have reviewed the content of the documentation and have edited it as needed. I have personally performed the services documented here and the documentation accurately represents those services and the decisions I have made.      Electronically signed by:  ***     ***  ____________________________________________    CC: No chief complaint on file.    HPI:  Mr. Shailesh Hartman is a(n) 89 year old male who presents today as a new patient for itch.  Last seen 5/15/24 at [facility] by Dr. Blank Connell for skin cancer screening. 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 The patient noted itch of the R lower leg, scalp, beard, and upper extremities over the last few months. He does not have generalized itch. The patient has tried eucerin and oatmeal which has helped with the itch. He has a catheter bag on RLE that he should may have causing itch. He thinks he has eczema. He has no history of a fungal infection. Patient is otherwise feeling well, without additional skin concerns.    Physical Exam:  Vitals: There were no vitals taken for this visit.  SKIN: Total skin excluding the undergarment areas was performed. The exam included the head/face, neck, both arms, chest, back, abdomen, both legs, digits and/or nails.   - scaly thin pink plaques on LUE and LLE   - a few pustules and pink papules across the back  - pink-brown scaly papules on the RLE   - xerotic patches on the upper and lower extremities   - No other lesions of  concern on areas examined.     Medications:  Current Outpatient Medications   Medication Sig Dispense Refill     acetaminophen (TYLENOL) 500 MG tablet Take 500-1,000 mg by mouth 3 times daily.       allopurinol (ZYLOPRIM) 300 MG tablet Take 300 mg by mouth daily       bisacodyl (DULCOLAX) 5 MG EC tablet Take 2 tablets at 3 pm the day before your procedure. If your procedure is before 11 am, take 2 additional tablets at 11 pm. If your procedure is after 11 am, take 2 additional tablets at 6 am. For additional instructions refer to your colonoscopy prep instructions. 4 tablet 0     ciprofloxacin (CIPRO) 250 MG tablet Take 250 mg by mouth 2 times daily. (Patient not taking: Reported on 4/1/2025)       diphenhydrAMINE-acetaminophen (TYLENOL PM)  MG tablet Take 1 tablet by mouth nightly as needed for sleep.       hydrochlorothiazide (MICROZIDE) 12.5 MG capsule Take 12.5 mg by mouth daily (Patient not taking: Reported on 4/1/2025)       lisinopril (PRINIVIL,ZESTRIL) 20 MG tablet Take 20 mg by mouth 2 times daily       melatonin 3 MG tablet Take 1.5 mg by mouth nightly as needed for sleep.       Multiple Vitamin (MULTI-VITAMINS) TABS 1 tab by mouth daily       oxyCODONE (ROXICODONE) 5 MG tablet Take 1-2 tablets (5-10 mg) by mouth every 4 hours as needed for moderate to severe pain. (Patient not taking: Reported on 4/1/2025) 6 tablet 0     phenazopyridine (PYRIDIUM) 200 MG tablet Take 1 tablet (200 mg) by mouth 3 times daily as needed for irritation. Take for bladder pain 9 tablet 0     potassium chloride ER (K-TAB/KLOR-CON) 10 MEQ CR tablet TAKES 10 MEQ BID  1     senna-docusate (SENOKOT-S/PERICOLACE) 8.6-50 MG tablet Take 1-2 tablets by mouth 2 times daily. 30 tablet 0     simvastatin (ZOCOR) 20 MG tablet Take 1 tablet by mouth At Bedtime       tamsulosin (FLOMAX) 0.4 MG capsule Take 1 capsule (0.4 mg) by mouth daily as needed. Take for stent discomfort or for kidney stone passage 30 capsule 0     tolterodine  (DETROL) 2 MG tablet Take 1 tablet (2 mg) by mouth every 12 hours as needed for incontinence (Spasms). 30 tablet 0     triamterene-HCTZ (DYAZIDE) 37.5-25 MG capsule TK 1 C PO D  3     Current Facility-Administered Medications   Medication Dose Route Frequency Provider Last Rate Last Admin     sulfamethoxazole-trimethoprim (BACTRIM DS) 800-160 MG per tablet 1 tablet  1 tablet Oral Once Jama Ray MD          Past Medical History:   Patient Active Problem List   Diagnosis     Achilles tendonitis     Family history of colon cancer     Mechanical low back pain     Primary prostate cancer with metastasis from prostate to other site (H)     Malignant neoplasm of prostate (H)     Primary malignant neoplasm of prostate (H)     Hyperlipidemia     Gout, arthritis     Essential hypertension, benign     Elevated prostate specific antigen (PSA)     Cardiac conduction disorder     Bradycardia     Typical atrial flutter (H)     Senile purpura     Stage 3b chronic kidney disease (H)     Stage 3a chronic kidney disease (H)     Past Medical History:   Diagnosis Date     Pacemaker         CC Jama Ray MD  [address] [place] [phone] on close of this encounter.       Again, thank you for allowing me to participate in the care of your patient.      Sincerely,    Sumit Hutton MD

## 2025-04-08 NOTE — NURSING NOTE
Dermatology Rooming Note    Shailesh Hartman's goals for this visit include:   Chief Complaint   Patient presents with    Eczema     Catheter worsens eczema on R leg and on chin, also on back and arms, is wondering about treatment options  Reports bumps by R eye that he is concerned about     ARACELI Aranda

## 2025-04-08 NOTE — PROGRESS NOTES
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  - current tx:triamcinolone 0.1% ointment BID PRN to all affected areas on the body excluding the face   # Folliculitis, upper back  - current tx: BPO 5% wash in the shower qday, clindamycin 1% lotion to pustules BID as needed  # Seborrheic dermatitis, scalp and face   - current tx: ketoconozole 2% shampoo MWF to the scalp and face   # History of skin cancer  - BCC, nodular-left malar cheek MOHS 2/2024  - BCC, left posterior forearm-ED&C 2/2024  - right temple, MOHs elsewhere 2022  - right lower eyelid, MOHs elsewhere 2008   ____________________________________________    Assessment & Plan:  # Nummular eczema vs other eczematous process  The patient reported persistent itch across the RLE, LUE, and back. Exam is above. There are components of nummular eczema, which is common in elderly patient. The patient was counseled to follow the plan below and further instructed that topical steroids rarely clinically significant systemic absorption.   - start triamcinolone 0.1% ointment BID PRN to all affected areas on the body excluding the face     # Folliculitis, upper back  Exam as above. Favoring superficial bacterial folliculitis given exam. Plan to treat as below. The patient was counseled on the application of both medications.   - start BPO 5% wash in the shower qday   - start clindamycin 1% lotion to pustules BID as needed    # Seborrheic dermatitis, scalp and face   - start ketoconozole 2% shampoo MWF to the scalp and face     Ayaz Young MD    Staff and Scribe:   Staff: Dr. Brennen Kulkarni Disclosure:   I, Jojo Caceres, am serving as a scribe; to document services personally performed by Sumit Hutton MD -based on data collection and the provider's statements to me.     Provider Disclosure:  I agree with above History, Review of Systems,  Physical exam and Plan.  I have reviewed the content of the documentation and have edited it as needed. I have personally performed the services documented here and the documentation accurately represents those services and the decisions I have made.      Electronically signed by:  ***     ***  ____________________________________________    CC: No chief complaint on file.    HPI:  Mr. Shailesh Hartman is a(n) 89 year old male who presents today as a new patient for itch.  Last seen 5/15/24 at [facility] by Dr. Blank Connell for skin cancer screening. 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 The patient noted itch of the R lower leg, scalp, beard, and upper extremities over the last few months. He does not have generalized itch. The patient has tried eucerin and oatmeal which has helped with the itch. He has a catheter bag on RLE that he should may have causing itch. He thinks he has eczema. He has no history of a fungal infection. Patient is otherwise feeling well, without additional skin concerns.    Physical Exam:  Vitals: There were no vitals taken for this visit.  SKIN: Total skin excluding the undergarment areas was performed. The exam included the head/face, neck, both arms, chest, back, abdomen, both legs, digits and/or nails.   - scaly thin pink circular plaques on LUE and LLE   - a few pustules and pink papules across the back  - xerotic patches on the upper and lower extremities   - greasy-yellow scale on the scalp and forehead   - No other lesions of concern on areas examined.     Medications:  Current Outpatient Medications   Medication Sig Dispense Refill    acetaminophen (TYLENOL) 500 MG tablet Take 500-1,000 mg by mouth 3 times daily.      allopurinol (ZYLOPRIM) 300 MG tablet Take 300 mg by mouth daily      bisacodyl (DULCOLAX) 5 MG EC tablet Take 2 tablets at 3 pm the day before your procedure. If your procedure is before 11 am,  take 2 additional tablets at 11 pm. If your procedure is after 11 am, take 2 additional tablets at 6 am. For additional instructions refer to your colonoscopy prep instructions. 4 tablet 0    ciprofloxacin (CIPRO) 250 MG tablet Take 250 mg by mouth 2 times daily. (Patient not taking: Reported on 4/1/2025)      diphenhydrAMINE-acetaminophen (TYLENOL PM)  MG tablet Take 1 tablet by mouth nightly as needed for sleep.      hydrochlorothiazide (MICROZIDE) 12.5 MG capsule Take 12.5 mg by mouth daily (Patient not taking: Reported on 4/1/2025)      lisinopril (PRINIVIL,ZESTRIL) 20 MG tablet Take 20 mg by mouth 2 times daily      melatonin 3 MG tablet Take 1.5 mg by mouth nightly as needed for sleep.      Multiple Vitamin (MULTI-VITAMINS) TABS 1 tab by mouth daily      oxyCODONE (ROXICODONE) 5 MG tablet Take 1-2 tablets (5-10 mg) by mouth every 4 hours as needed for moderate to severe pain. (Patient not taking: Reported on 4/1/2025) 6 tablet 0    phenazopyridine (PYRIDIUM) 200 MG tablet Take 1 tablet (200 mg) by mouth 3 times daily as needed for irritation. Take for bladder pain 9 tablet 0    potassium chloride ER (K-TAB/KLOR-CON) 10 MEQ CR tablet TAKES 10 MEQ BID  1    senna-docusate (SENOKOT-S/PERICOLACE) 8.6-50 MG tablet Take 1-2 tablets by mouth 2 times daily. 30 tablet 0    simvastatin (ZOCOR) 20 MG tablet Take 1 tablet by mouth At Bedtime      tamsulosin (FLOMAX) 0.4 MG capsule Take 1 capsule (0.4 mg) by mouth daily as needed. Take for stent discomfort or for kidney stone passage 30 capsule 0    tolterodine (DETROL) 2 MG tablet Take 1 tablet (2 mg) by mouth every 12 hours as needed for incontinence (Spasms). 30 tablet 0    triamterene-HCTZ (DYAZIDE) 37.5-25 MG capsule TK 1 C PO D  3     Current Facility-Administered Medications   Medication Dose Route Frequency Provider Last Rate Last Admin    sulfamethoxazole-trimethoprim (BACTRIM DS) 800-160 MG per tablet 1 tablet  1 tablet Oral Once Jama Ray MD           Past Medical History:   Patient Active Problem List   Diagnosis    Achilles tendonitis    Family history of colon cancer    Mechanical low back pain    Primary prostate cancer with metastasis from prostate to other site (H)    Malignant neoplasm of prostate (H)    Primary malignant neoplasm of prostate (H)    Hyperlipidemia    Gout, arthritis    Essential hypertension, benign    Elevated prostate specific antigen (PSA)    Cardiac conduction disorder    Bradycardia    Typical atrial flutter (H)    Senile purpura    Stage 3b chronic kidney disease (H)    Stage 3a chronic kidney disease (H)     Past Medical History:   Diagnosis Date    Pacemaker         CC Jama Ray MD  [address] [place] [phone] on close of this encounter.

## 2025-04-22 ENCOUNTER — TELEPHONE (OUTPATIENT)
Dept: UROLOGY | Facility: CLINIC | Age: OVER 89
End: 2025-04-22
Payer: MEDICARE

## 2025-04-22 NOTE — TELEPHONE ENCOUNTER
[facility] Call Center    Phone Message    May a detailed message be left on voicemail: no     Reason for Call: Other: Patients ENT Dr Covarrubias wants to speak with Dr Ray directly. Please call her clinic ask for Jess and she will page Dr Sophy Covarrubias. This is in regards to patients cancer Dx.       Action Taken: Other: [facility]    Travel Screening: Not Applicable     Date of Service:

## 2025-04-22 NOTE — TELEPHONE ENCOUNTER
I called to ENT clinic with Dr. Sophy Covarrubias and provided your # she can call back to discuss this pt.

## 2025-04-23 ENCOUNTER — PRE VISIT (OUTPATIENT)
Dept: UROLOGY | Facility: CLINIC | Age: OVER 89
End: 2025-04-23

## 2025-04-23 NOTE — TELEPHONE ENCOUNTER
Previsit Planning        Reason for visit: Consult     Relevant Information: Ureteral Mass    Records/imaging/labs/orders: Available     Rooming: Standard

## 2025-04-23 NOTE — TELEPHONE ENCOUNTER
Called Dr. Covarrubias's office. They are unable to give out direct number for Dr. Covarrubias and instead ask that when Dr. Ray is available, that he call Dr. Covarrubias's office at [phone] and ask that Dr. Covarrubias be paged.

## 2025-04-25 ENCOUNTER — OFFICE VISIT (OUTPATIENT)
Dept: UROLOGY | Facility: CLINIC | Age: OVER 89
End: 2025-04-25
Payer: MEDICARE

## 2025-04-25 VITALS
HEIGHT: 69 IN | OXYGEN SATURATION: 96 % | SYSTOLIC BLOOD PRESSURE: 138 MMHG | WEIGHT: 190 LBS | DIASTOLIC BLOOD PRESSURE: 73 MMHG | HEART RATE: 67 BPM | BODY MASS INDEX: 28.14 KG/M2

## 2025-04-25 DIAGNOSIS — C66.2 URETER CANCER, LEFT (H): Primary | ICD-10-CM

## 2025-04-25 PROCEDURE — 1126F AMNT PAIN NOTED NONE PRSNT: CPT | Performed by: UROLOGY

## 2025-04-25 PROCEDURE — 3078F DIAST BP <80 MM HG: CPT | Performed by: UROLOGY

## 2025-04-25 PROCEDURE — 3075F SYST BP GE 130 - 139MM HG: CPT | Performed by: UROLOGY

## 2025-04-25 PROCEDURE — 99215 OFFICE O/P EST HI 40 MIN: CPT | Mod: 25 | Performed by: UROLOGY

## 2025-04-25 ASSESSMENT — PAIN SCALES - GENERAL: PAINLEVEL_OUTOF10: NO PAIN (0)

## 2025-04-25 NOTE — LETTER
4/25/2025       RE: Shailesh Hartman  [address] [facility] [address]     Dear Colleague,    Thank you for referring your patient, Shailesh Hartman, to the [facility] UROLOGY CLINIC [place] at [facility]. Please see a copy of my visit note below.     [facility] UROLOGY [facility].  Phone: [phone]   Fax: [phone]  MIKE Ray MD  Visit Date: Apr 25, 2025  Patient:  Shailesh Hartman  Date of birth 7/9/1932, 89 year old male       Assessment and Plan    Left distal ureteral stricture.  1/24/2025 renal ultrasound shows moderate left hydronephrosis and a Barron catheter in the bladder.  The 2025 CT scan at [place] [facility] shows left hydronephrosis with thickening in the distal 6 cm of the ureter.  This is concerning for urothelial cancer.  2/25/25 Left ureteroscopy with biopsy, stent.  Dr. Nik Ray, [facility].  No tumor seen.  Pathology showed no evidence of malignancy.  4/16/25 FNA of left neck lymph nodes showed malignancy consistent with urothelial cell carcinoma.  Dr Sophy Covarrubias, [facility]    Prostate cancer  He had treatment with external beam radiation in the distant past.  5/2016 a positive pelvic lymph node was identified.  12/12/2024 choline PET at [facility] showed no evidence of any metastatic cancer.  3/10/25 Choline PET showed nonspecific cervical lymph nodes.    Benign Parotid Mass     Urinary retention   2025 Barron placed.  4/1/25 Visit with Dr Mandujano.  Plan is referral to [place] for UroLift with Cristiano Gonzalez MD.      The following are complicating factors.  These increase the risk of perioperative complications and make treatment more complicated.  History of a cardiac pacemaker    Overall, this is a difficult situation.  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  With that in mind, I did speak with radiology today and they agreed that a FDG PET would be more likely to diagnose metastasis than choline PET he had for prostate cancer.  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  I had a lengthy conversation with the patient and his family and answered all of his questions.    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  Plan for stent exchange 6/2/25  PET scan to identify any additional metastasis from likely urothelial cell carcinoma.  __________________________________________________    HPI  He returns today to discuss the pathology from a neck lymph nodes biopsy at [place] showing likely urothelial cell carcinoma.      He is otherwise doing well today and notes that his last catheter change was much less painful.    1/10/2023 CT scan  I reviewed the radiologic images and report from this radiologic exam.  My independent interpretation is:  Fairly enlarged bladder.    12/12/2024 PSA 1.0 at the [facility]    45 minutes spent on the date of this encounter in patient visit, chart review, history and exam, review of test results, independent interpretation of radiology report, discussion with radiology, and post visit documentation      CC  Patient Care Team:  Mar Perales MD as PCP - General (Family Medicine)  Mar Perales MD (Family Practice)  Tiffanie Best MD as Assigned Musculoskeletal Provider  Jama Ray MD as Assigned Surgical Provider  Sumit Hutton MD as MD (Dermatology)      Copy to patient  [facility] [place]  [address] [facility] [address]      Again, thank you for allowing me to participate in the care of your patient.      Sincerely,    Jama Ray MD

## 2025-04-25 NOTE — NURSING NOTE
"Shailesh Hartman is a 89 year old male patient that presents today in clinic for the following:    Chief Complaint   Patient presents with    Consult       The patient's allergies and medications were reviewed as noted. A set of vitals were recorded as noted without incident. The patient does not have any other questions for the provider.    Blood pressure 138/73, pulse 67, height 1.74 m (5' 8.5\"), weight 86.2 kg (190 lb), SpO2 96%. Body mass index is 28.47 kg/m .    Patient Active Problem List   Diagnosis    Achilles tendonitis    Family history of colon cancer    Mechanical low back pain    Primary prostate cancer with metastasis from prostate to other site (H)    Malignant neoplasm of prostate (H)    Primary malignant neoplasm of prostate (H)    Hyperlipidemia    Gout, arthritis    Essential hypertension, benign    Elevated prostate specific antigen (PSA)    Cardiac conduction disorder    Bradycardia    Typical atrial flutter (H)    Senile purpura    Stage 3b chronic kidney disease (H)    Stage 3a chronic kidney disease (H)       Allergies   Allergen Reactions    Seasonal Allergies Other (See Comments)     Running nose    Molds & Smuts Other (See Comments)     Sneezing, coongestion    mold extract    Soap Other (See Comments)     Skin irritation    Shaving Soap and most deodarants cause skin irritation.    Wheat Other (See Comments) and Unknown     Runny nose  Runny nose         Current Outpatient Medications   Medication Sig Dispense Refill    acetaminophen (TYLENOL) 500 MG tablet Take 500-1,000 mg by mouth 3 times daily.      allopurinol (ZYLOPRIM) 300 MG tablet Take 300 mg by mouth daily      benzoyl peroxide 5 % external liquid Use in the shower once per day 236 mL 11    bisacodyl (DULCOLAX) 5 MG EC tablet Take 2 tablets at 3 pm the day before your procedure. If your procedure is before 11 am, take 2 additional tablets at 11 pm. If your procedure is after 11 am, take 2 additional tablets at 6 am. For " additional instructions refer to your colonoscopy prep instructions. 4 tablet 0    ciprofloxacin (CIPRO) 250 MG tablet Take 250 mg by mouth 2 times daily.      clindamycin (CLEOCIN T) 1 % external lotion Apply topically 2 times daily. Apply to pink bumps until itch has resolved 60 mL 3    diphenhydrAMINE-acetaminophen (TYLENOL PM)  MG tablet Take 1 tablet by mouth nightly as needed for sleep.      hydrochlorothiazide (MICROZIDE) 12.5 MG capsule Take 12.5 mg by mouth daily.      lisinopril (PRINIVIL,ZESTRIL) 20 MG tablet Take 20 mg by mouth 2 times daily      melatonin 3 MG tablet Take 1.5 mg by mouth nightly as needed for sleep.      Multiple Vitamin (MULTI-VITAMINS) TABS 1 tab by mouth daily      oxyCODONE (ROXICODONE) 5 MG tablet Take 1-2 tablets (5-10 mg) by mouth every 4 hours as needed for moderate to severe pain. 6 tablet 0    phenazopyridine (PYRIDIUM) 200 MG tablet Take 1 tablet (200 mg) by mouth 3 times daily as needed for irritation. Take for bladder pain 9 tablet 0    potassium chloride ER (K-TAB/KLOR-CON) 10 MEQ CR tablet TAKES 10 MEQ BID  1    senna-docusate (SENOKOT-S/PERICOLACE) 8.6-50 MG tablet Take 1-2 tablets by mouth 2 times daily. 30 tablet 0    simvastatin (ZOCOR) 20 MG tablet Take 1 tablet by mouth At Bedtime      tamsulosin (FLOMAX) 0.4 MG capsule Take 1 capsule (0.4 mg) by mouth daily as needed. Take for stent discomfort or for kidney stone passage 30 capsule 0    tolterodine (DETROL) 2 MG tablet Take 1 tablet (2 mg) by mouth every 12 hours as needed for incontinence (Spasms). 30 tablet 0    triamterene-HCTZ (DYAZIDE) 37.5-25 MG capsule TK 1 C PO D  3    ketoconazole (NIZORAL) 2 % external shampoo Apply to the scalp for 5 minutes in the shower three times a week (Patient not taking: Reported on 4/25/2025) 120 mL 3    triamcinolone (KENALOG) 0.1 % external ointment Apply topically 2 times daily. Apply to the right lower leg and all other itchy pink spots on the body (Patient not taking:  Reported on 4/25/2025) 80 g 3       Social History     Tobacco Use    Smoking status: Former    Smokeless tobacco: Never   Substance Use Topics    Alcohol use: Not Currently    Drug use: Never       Ozzy Farmer, EMT-P   4/25/2025  10:31 AM

## 2025-04-25 NOTE — PROGRESS NOTES
[facility] [place].  Phone: [phone]   Fax: [phone]  MIKE Ray MD  Visit Date: Apr 25, 2025  Patient:  Shailesh Hartman  Date of birth 7/9/1932, 89 year old male       Assessment and Plan    Left distal ureteral stricture.  1/24/2025 renal ultrasound shows moderate left hydronephrosis and a Barron catheter in the bladder.  The 2025 CT scan at [facility] shows left hydronephrosis with thickening in the distal 6 cm of the ureter.  This is concerning for urothelial cancer.  2/25/25 Left ureteroscopy with biopsy, stent.  Dr. Nik Ray, [facility].  No tumor seen.  Pathology showed no evidence of malignancy.  4/16/25 FNA of left neck lymph nodes showed malignancy consistent with urothelial cell carcinoma.  Dr Sophy Covarrubias, [facility]    Prostate cancer  He had treatment with external beam radiation in the distant past.  5/2016 a positive pelvic lymph node was identified.  12/12/2024 choline PET at [facility] showed no evidence of any metastatic cancer.  3/10/25 Choline PET showed nonspecific cervical lymph nodes.    Benign Parotid Mass     Urinary retention   2025 Barron placed.  4/1/25 Visit with Dr Mandujano.  Plan is referral to [facility] for UroLift with Cristiano Gonzalez MD.      The following are complicating factors.  These increase the risk of perioperative complications and make treatment more complicated.  History of a cardiac pacemaker    Overall, this is a difficult situation.  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  My suspicion is that this is mostly likely metastatic urothelial cell carcinoma and the ureter biopsy was negative due to sampling error.  With that in mind, I did speak with radiology today and they agreed that a FDG PET would be more likely to diagnose metastasis than choline PET he had for prostate cancer.  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  I had a lengthy conversation with the patient and his family and answered all of his questions.    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  Plan for stent exchange 6/2/25  PET scan to identify any additional metastasis from likely urothelial cell carcinoma.  __________________________________________________    HPI  He returns today to discuss the pathology from a neck lymph nodes biopsy at [place] showing likely urothelial cell carcinoma.      He is otherwise doing well today and notes that his last catheter change was much less painful.    1/10/2023 CT scan  I reviewed the radiologic images and report from this radiologic exam.  My independent interpretation is:  Fairly enlarged bladder.    12/12/2024 PSA 1.0 at the [facility]    45 minutes spent on the date of this encounter in patient visit, chart review, history and exam, review of test results, independent interpretation of radiology report, discussion with radiology, and post visit documentation      CC  Patient Care Team:  Mar Perales MD as PCP - General (Family Medicine)  Mar Perales MD (Family Practice)  Tiffanie Best MD as Assigned Musculoskeletal Provider  Jama Ray MD as Assigned Surgical Provider  Sumit Hutton MD as MD (Dermatology)      Copy to patient  [facility]  [id number] [facility] [id number]

## 2025-04-26 PROBLEM — C77.0 METASTASIS TO CERVICAL LYMPH NODE (H): Status: ACTIVE | Noted: 2025-04-26

## 2025-04-26 PROBLEM — C77.9 METASTASIS TO LYMPH NODES (H): Status: ACTIVE | Noted: 2025-04-26

## 2025-04-26 PROBLEM — C79.51 METASTASIS TO BONE (H): Status: ACTIVE | Noted: 2025-04-26

## 2025-04-26 PROBLEM — C66.2: Status: ACTIVE | Noted: 2025-04-26

## 2025-04-26 NOTE — PROGRESS NOTES
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  Patient presents with urinary retention and left hydroureteronephrosis.  CT abdomen, pelvis 02/03/2025, revealed moderate left hydroureteronephrosis with ureter dilation to the level of the distal ureter and a 6.2 cm segment of abnormal diffuse urothelial thickening and enhancement in the distal left ureter.  There was mild prostatomegaly with prostate fiducial markers in place.  Urine cytology 02/25/2025, revealed atypical cells.  Patient underwent cystoscopy ureteroscopy, retrograde left pyelogram, left ureteral biopsy and left ureteral stent placement 02/25/2025, that revealed fibrotic stromal tissue with close side effect and chronic inflammation without evidence of malignancy.  11-C choline PET/CT scan performed for surveillance for previously diagnosed prostate cancer 12/12/2024, revealed stable diffuse choline uptake in the prostate with SUV max 4.  There was a 1.2 cm moderately choline-avid level 2 left cervical lymph node.  There is no other choline-avid lymphadenopathy.  CT neck 12/31/2024, revealed a 1.2 x 1.6 x 1.7 cm level 2A left cervical lymph node corresponding to the choline-avid lymph node on prior 11-C choline PET/CT scan.  There was a 2 x 1.3 x 1.8 cm well-circumscribed enhancing homogeneous mass in the left parotid that was not choline-avid on the prior PET/CT scan. Repeat 11-C choline PET/CT scan 03/10/2025, revealed faint choline uptake throughout the atrophic prostate that was unchanged.  There was an intensely choline-avid level 2A left cervical lymph node.  There was also an enhancing mass in the left parotid.  There are no other choline-avid lesions. Ultrasound-guided left cervical lymph node core needle biopsy  04/16/2025, revealed malignant epithelioid cells with focal areas of necrosis positive for CK AE1/AE3, GATA3, and p63 (weak), but negative for PSA and NKX3.1 consistent with urothelial carcinoma.  Pathology review at the [facility] revealed metastatic carcinoma with morphology and immunohistochemistry suggesting primary carcinoma from the salivary gland, breast, or urothelium depending on clinical and imaging correlation. 18-F FDG PET/CT scan 05/07/2025, revealed a 1.6 x 1.3 cm level 2A left cervical lymph node with SUV max 16.12 that was a previously biopsied metastatic urothelial carcinoma. There was a 1.6 x 1.5 cm right submandibular lymph node with SUV max 3.82.  There was a 2.1 x 1.3 cm enhancing anterior left parotid lymph node with SUV max 3.99 that was a previously biopsied pleomorphic adenoma.  There was mild left pelvocaliectasis with diffuse pelvicalyceal and ureteral FDG uptake favoring FDG tracer excretion through the ureteral stent, with a focus of hypermetabolism in the distal left ureter proximal to the ureteral stenosis that was indeterminate but in the location of the presumed primary lesion.  There was focal intense FDG uptake near the right posterior apex peripheral zone of the prostate with SUV max 5.3, without CT correlate. Patient has mild fatigue, decrease in exercise tolerance, muscle weakness particularly in the lower extremities, imbalance, mild decrease in appetite, 40-pound weight loss in the past year, and an indwelling Barron catheter due to urinary retention. He is independent and is able to perform his activities of daily living and some chores.  Patient uses walking sticks for balance and mobility.  He denies fever, headache, cough, dyspnea, chest pain, abdominal pain, nausea, vomiting, constipation, diarrhea, bleeding, or bone pain.     PAST HISTORY:   -Hypertension.  -Hyperlipidemia.  -Atrial fibrillation status post ablation of the right atrial cavotricuspid isthmus,  06/30/2023.  -AV block status post dual-chamber permanent pacemaker implantation , 06/30/2023.  -Stage 3b chronic kidney disease.  -Stage IV urothelial carcinoma of the distal left ureter (cT3, cN0, cM1).  -Stage CORY adenocarcinoma prostate (cT3a, cN1, cM0, PSA 14.2 ng/mL, grade group 4). Patient presented with a prostate nodule identified on physical exam in 2013.  PSA was 14.2 ng/mL (09/2013).  Transrectal ultrasound-guided prostate core needle biopsy 09/30/2013, revealed a Robert 4+4= 8 adenocarcinoma involving 3% of the core needle biopsy samples from the left lateral base.  There was a Robert 4+3 equal 7 adenocarcinoma involving 50% of the core needle biopsy sample from the right apex, 50% of 2 of 2 biopsy samples with perineural invasion from the right mid, 90% of 2 of 2 biopsy samples from the right mid lateral.  There was a [place] 3+4=7 adenocarcinoma involving 20% of the core needle biopsy sample from the right base, 20% of the biopsy sample with perineural invasion from the right lateral base, 50% of the biopsy sample with perineural invasion from the right anterior apex.  There is a [place] 3+3=6 adenocarcinoma involving 5% of the core needle biopsy sample from the left anterior apex.  There was high-grade prostatic intraepithelial neoplasm in the biopsy sample from the left apex, left mid, and left base.  There was benign prostatic tissue in the core needle biopsy sample from the left mid lateral (10/14 samples involved).  CT abdomen, pelvis 10/17/2013, was negative for adenopathy or metastases.  There was prostatomegaly.  Bone scan 10/17/2013, was negative for metastases.  There was mild uptake in the thoracic and lumbar spine due to degenerative changes.  MRI pelvis 11/26/2013, revealed abnormal T2 hypointensity in the medial lateral aspect of the mid and apical peripheral zone on the right, and medial base and midportion of the left peripheral zone.  There was similar-appearing T2 hypointensity in  the adjacent transitional zone.  There was extracapsular extension involving the right and left pseudocapsule and possible involvement of the base of the left seminal vesicle (PI-RADS 5).  There was no lymphadenopathy or suspicious osseous lesions. Patient received definitive external beam radiation therapy to the prostate and seminal vesicles from 02/03/2014 through 04/02/2014.  There was a PSA julisa of 1.21 ng/mL (12/02/2014) with subsequent biochemical progression with PSA 1.96 ng/mL (10/21/2015), PSA 3.64 ng/mL (0/13/2016).  CT abdomen, pelvis 0/28/2016, revealed a 1.5 x 1.2 cm left periaortic lymph node, a 0.8 cm right common iliac lymph node, a 1.6 x 0.8 cm right external iliac lymph node, and a 0.9 x 0.4 cm right internal iliac lymph node. CT-guided right iliac lymph node core needle biopsy 05/12/2016, revealed prostate adenocarcinoma positive for PSA.  Bone scan 06/10/2016, was negative for metastases.  Patient underwent robotic-assisted bilateral pelvic lymph node dissection 06/29/2016, that revealed 3 of 5 right external iliac lymph nodes and 1 of 5 left external iliac lymph nodes positive for metastatic prostate adenocarcinoma (4 of 10 lymph nodes involved).  Restaging 11-C choline PET/CT scan 11/04/2016 revealed prominent confluent left periaortic retroperitoneal lymph nodes with moderate choline activity, small choline-avid right common iliac lymph nodes, and small choline-avid right internal iliac lymph nodes.  There was moderate heterogeneous activity throughout the prostate gland greatest in the left lobe.  There were no other choline-avid lesions.  Patient received androgen deprivation therapy (ADT) x1 year consisting of degarelix loading dose 240 mg on 11/30/2016, then leuprolide 22.5 mg every 3 months from 12/29/2016 through 11/07/2017, concurrent with radiation therapy to the pelvic and para-aortic lymph nodes (30 Gy in 25 fractions) from 03/20/2017 through 05/01/2017.  PSA was undetectable  (03/31/2017) followed by PSA recovery to 0.14 ng/mL (07/19/2018), PSA 0.7 ng/mL (01/25/2019), PSA 0.84 ng/mL (03/02/2020), PSA 0.87 ng/mL (04/02/2021), PSA 1.1 ng/mL (04/22/2022), PSA 1.2 ng/mL (05/15/2023), PSA 1 ng/mL (01/02/2024) PSA 1.3 ng/mL (03/11/2025). Serial 11-C choline PET/CT scan 11/24/2017, 09/21/2018, 01/25/2019, 06/05/2019, 10/31/2019, 03/02/2020, 07/10/2020, 11/11/2020, 0/02/2021, 08/03/2021, 12/08/2021, 04/22/2022, 12/30/2022, 05/12/2023, 08/18/2023, 12/27/2023, 05/23/2024, 09/11/2024, showed stable patchy uptake in the prostate, and focal moderate choline activity in the T2 and T4 vertebral bodies that was stable. 18-F DCFPyl PET/CT scan 08/29/2022, revealed focal PSMA uptake along the posterior margin of the right prostate.  There were no other PSMA avid lesions.  -Excision of basal cell skin cancer.  -History of herpes zoster left V1 distribution 05/29/2020.   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  -Colon polyp. A tubular adenoma was removed from the ascending, transverse, and rectosigmoid colon at the time of colonoscopy 05/17/2023.  -Diverticulosis.  -External hemorrhoids.   -Gout.  -Osteoporosis.  -Nasal fracture, 1989  -Left sacroiliac fusion, 03/10/2022.  -L2-L5 laminectomy,  L4-L5, decompression,  transforminal lumbar interbody fusion, instrumentation, and bone graft, 11/16/2021.  -Cataract extraction, intraocular lens implant, both eyes, 07/20/2021 and 07/27/2021.  -Right total hip replacement, 2007.  -L4-L5 right hemilaminectomy and foraminotomy, 11/08/2005.   -Tonsillectomy, adenoidectomy, 1939.    ALLERGIES: No known drug allergies.    MEDICATIONS:   Current Outpatient Medications   Medication Sig Dispense Refill    acetaminophen (TYLENOL) 500 MG tablet Take 500-1,000 mg by mouth 3 times daily.      allopurinol (ZYLOPRIM) 300 MG tablet Take 300 mg by mouth daily      lisinopril (PRINIVIL,ZESTRIL) 20 MG tablet Take 20 mg by mouth 2 times daily      melatonin 3 MG tablet Take 1.5  mg by mouth nightly as needed for sleep.      Multiple Vitamin (MULTI-VITAMINS) TABS 1 tab by mouth daily      oxyCODONE (ROXICODONE) 5 MG tablet Take 1-2 tablets (5-10 mg) by mouth every 4 hours as needed for moderate to severe pain. 6 tablet 0    potassium chloride ER (K-TAB/KLOR-CON) 10 MEQ CR tablet TAKES 10 MEQ BID  1    simvastatin (ZOCOR) 20 MG tablet Take 1 tablet by mouth At Bedtime      triamterene-HCTZ (DYAZIDE) 37.5-25 MG capsule TK 1 C PO D  3    benzoyl peroxide 5 % external liquid Use in the shower once per day (Patient not taking: Reported on 5/8/2025) 236 mL 11    bisacodyl (DULCOLAX) 5 MG EC tablet Take 2 tablets at 3 pm the day before your procedure. If your procedure is before 11 am, take 2 additional tablets at 11 pm. If your procedure is after 11 am, take 2 additional tablets at 6 am. For additional instructions refer to your colonoscopy prep instructions. (Patient not taking: Reported on 5/8/2025) 4 tablet 0    ciprofloxacin (CIPRO) 250 MG tablet Take 250 mg by mouth 2 times daily. (Patient not taking: Reported on 5/8/2025)      clindamycin (CLEOCIN T) 1 % external lotion Apply topically 2 times daily. Apply to pink bumps until itch has resolved (Patient not taking: Reported on 5/8/2025) 60 mL 3    diphenhydrAMINE-acetaminophen (TYLENOL PM)  MG tablet Take 1 tablet by mouth nightly as needed for sleep. (Patient not taking: Reported on 5/8/2025)      hydrochlorothiazide (MICROZIDE) 12.5 MG capsule Take 12.5 mg by mouth daily. (Patient not taking: Reported on 5/8/2025)      ketoconazole (NIZORAL) 2 % external shampoo Apply to the scalp for 5 minutes in the shower three times a week (Patient not taking: Reported on 5/8/2025) 120 mL 3    phenazopyridine (PYRIDIUM) 200 MG tablet Take 1 tablet (200 mg) by mouth 3 times daily as needed for irritation. Take for bladder pain (Patient not taking: Reported on 5/8/2025) 9 tablet 0    senna-docusate (SENOKOT-S/PERICOLACE) 8.6-50 MG tablet Take 1-2  tablets by mouth 2 times daily. (Patient not taking: Reported on 2025) 30 tablet 0    tamsulosin (FLOMAX) 0.4 MG capsule Take 1 capsule (0.4 mg) by mouth daily as needed. Take for stent discomfort or for kidney stone passage (Patient not taking: Reported on 2025) 30 capsule 0    tolterodine (DETROL) 2 MG tablet Take 1 tablet (2 mg) by mouth every 12 hours as needed for incontinence (Spasms). (Patient not taking: Reported on 2025) 30 tablet 0    triamcinolone (KENALOG) 0.1 % external ointment Apply topically 2 times daily. Apply to the right lower leg and all other itchy pink spots on the body (Patient not taking: Reported on 2025) 80 g 3       FAMILY HISTORY: Father  age 72 of pancreas cancer.  Mother  in her 70s of mesenteric cancer.  There is 1 sister Sophy who  at age 58 of a cerebral aneurysm.  There is a half brother (from father) Jun age 72 with osteoarthritis.  There is 1 daughter age 65 with history of colon cancer.  There are 2 sons: 1 son age 63 with diabetes; another son age 52 is alive and well.    SOCIAL HISTORY: Patient was born and raised in [place].  He relocated to [place] in .  Patient is a  from his first spouse and  to his current spouse since  and lives in [place] with his spouse.  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  He received his medical degree from the [employer].  He has a 10-pack-year smoking history and quit at age 25.  He drinks wine rarely.  There is no  service.    Social History     Tobacco Use    Smoking status: Former    Smokeless tobacco: Never   Substance Use Topics    Alcohol use: Not Currently    Drug use: Never       REVIEW OF SYSTEMS: Review of systems reviewed with the patient and otherwise negative except for those detailed above.    PHYSICAL EXAM:  BP (!) 150/73 (BP Location: Right arm, Patient Position: Sitting, Cuff Size: Adult Large)   Pulse 66   Temp 98.3  F  "(36.8  C) (Oral)   Resp 16   Ht 1.716 m (5' 7.56\")   Wt 88.7 kg (195 lb 9.6 oz)   SpO2 97%   BMI 30.13 kg/m  .  Body surface area is 2.06 meters squared.  ECOG performance status: 2.  Skin: No erythema or rash.  HEENT: Sclera nonicteric.  Conjunctiva normal.  Pupils equal round reactive.  Nose clear.  Tongue and uvula midline.  Oropharynx without lesions or ulceration, mucosa pink and moist.  Neck: Supple without masses.  Nodes: No cervical, supraclavicular, axillary, or inguinal adenopathy.  Lungs: No dullness to percussion.  No rales, wheezes, rhonchi.  Heart: Regular rate and rhythm.  Abdomen: Bowel sounds present.  Soft, nontender, no hepatosplenomegaly or mass.  Extremities: No edema.  Neurologic: There is 4/5 strength in the lower extremities affecting ambulation.  Sensory and cerebellar normal.     IMAGING REVIEWED:   PET Oncology Whole Body    Narrative    Combined Report of: PET and CT on 5/7/2025 11:27 AM:     FOLLOW-UP APPOINTMENT: 5/8/2025    1. PET of the neck, chest, abdomen, and pelvis.  2. PET CT Fusion for Attenuation Correction and Anatomical  Localization.  3. Diagnostic CT of the chest, abdomen and pelvis with intravenous  contrast obtained for diagnostic interpretation.  4. 3D MIP and PET-CT fused images were processed on an independent  workstation and archived to PACS and reviewed by a radiologist.    Technique:    1. PET: The patient received 14.95 mCi of F-18-FDG. The serum glucose  was 99 mg/dL prior to administration. Body weight was 86.2 kg. Images  were evaluated in the axial, sagittal, and coronal planes as well as  the rotational whole body MIP. Images were acquired from cranial  vertex to feet.    UPTAKE WAS MEASURED AT 69 MINUTES.     2. CT: Volumetric acquisition for clinical interpretation of the  chest, abdomen, and pelvis acquired at 3 mm sections. The chest,  abdomen, and pelvis were evaluated at 5 mm sections in bone, soft  tissue, and lung windows.    Contrast and " Medications:  IV contrast: 113 mL of Isovue 370 intravenously.  PO contrast: 16 oz. H20  Additional Medications: Lasix 40 mg IV    3. 3D MIP and PET-CT fused images were processed on an independent  workstation and archived to PACS and reviewed by a radiologist.    INDICATION: Ureter cancer, left (H).    ADDITIONAL INFORMATION OBTAINED FROM EMR:  History of prostate cancer: distant remote external beam radiation,  positive pelvic node in 2016, 12/2024 choline PET at [facility]  showed no evidence of metastatic disease. 3/10/2025 choline PET showed  nonspecific cervical lymph node activity.    History of left ureteral stricture: left distal ureteral thickening on  [facility] CT 2/3/2025 with biopsy negative for malignancy.     FNA of neck lymph nodes showed malignancy consistent with urothelial  cell carcinoma at the [facility].    COMPARISON: PET CT [facility] 3/10/2025    FINDINGS:     BACKGROUND: Liver SUV max = 2.85, Aorta Blood SUV max = 2.24.       HEAD/NECK:  - Mild increase in size of an enhancing 2.1 x 1.3 cm anteriorly in the  left parotid lymph node with max SUV of 3.99 (series 2/3 image 98).   Presumably this is previously biopsied pleomorphic adenoma.  - Focus of intense SUV uptake in the left inferomedial parotid region,  indeterminant may represent an additional lymph node versus post FNA  inflammation if the parotid was traversed, max SUV 10.30 (series 2/3  image 106).  This was not seen on recent choline PET.  - No significant change in size of a left 2A lymph node measuring 1.6  x 1.3 cm, max SUV 16.12 (series 2/3 image 113). (Presumably the node  which si FNA positive for urothelial cancer)  - More conspicuous 1.6 x 1.5 cm right submandibular mildly FDG avid  lymph node (max SUV 3.82, series 2/3 [id number]), with recent choline  PET CT 5 mm slice thickness having a lower spatial resolution.    CHEST:    Lymph nodes: No FDG avid or abnormally enlarged lymph nodes.    Lungs: The central  tracheobronchial tree is clear.  Stable elevation  of the right hemidiaphragm. Similar mild dependent atelectasis. No  consolidation or suspicious airspace opacities. No suspicious or FDG  avid pulmonary nodule/mass. No pleural effusion or pneumothorax.    Heart and great vessels: Left chest wall dual-lead pacer device.  Grossly normal thyroid. Normal heart size. No pericardial effusion.  Atherosclerotic coronary artery calcifications, including the LAD.  Normal diameters of the main pulmonary artery and thoracic aorta.  Aortic calcifications. Unremarkable esophagus.    ABDOMEN AND PELVIS:    Liver: No FDG avid lesion. Unchanged hepatic cysts and subcentimeter  hypodensities, some of which too small to characterize. No suspicious  lesion or mass.    Pancreas: Fatty atrophy. No pancreatic ductal dilatation.     Spleen: No FDG avid lesion. Accessory splenules. Splenic size is  within normal limits.    Adrenal glands: No FDG avid foci. No nodule or mass.    Kidneys: Left double J ureteral stent in place. Mild left  pelvocaliectasis with diffuse pelvocalyceal and ureteral increased FDG  uptake/activity, nonspecific, favored to represent FDG tracer  excretion throughout the stented ureter, though possibly related to a  component of infection/inflammation and cannot rule out a urothelial  cancer. On post lasix imaging there is a focus of hypermetabolism in  the distal left ureter which appears to be proximal to the stenosis,  again cancer versus retained FDG in urine. There is no significant  residual thickening and enhancement involving the distal ureter  although the presence of the stent limits evaluation.  Substantial  decrease in the mild residual pelvocaliectasis in relation to  pre-stent CT 2/3/2025.    No right hydronephrosis or appreciable urothelial enhancement.     The urinary bladder is partially decompressed with Barron catheter in  place. There is substantial pelvic streak artifact which partially  obscures  evaluation.    Prostate & Pelvis: Substantial streak artifact from right hip  arthroplasty. Focal intense uptake near the right posterior apex  peripheral zone without definite CT correlate, with max SUV 10.97  (series 2/3 image 4 and 17).    Gastrointestinal system: Normal caliber of the small and large bowel.  Normal appendix. Foci of hypermetabolism in the sigmoid colon, left  midline near the ureter series 2 image 372 Max SUV 5.3.  Extensive  diverticulosis.    Lymph nodes: No definite FDG avid or abnormally enlarged lymph nodes,  with substantial streak artifact in the pelvis.    Vascular structures: Normal caliber of the abdominal aorta. Aortoiliac  atherosclerotic calcifications.    No free air, free fluid, or fluid collection.     EXTREMITIES:   No suspicious FDG uptake in the visualized extremities. Scattered  degenerative changes and nonspecific uptake surrounding the right hip  arthroplasty. Left SI joint fusion with intact hardware.    BONES AND SOFT TISSUES:     No suspicious FDG uptake in the skeleton. No abnormal lytic or blastic  osseous lesions. Substantial degenerative changes of the spine. Stable  grade 1 degenerative anterolisthesis of L4 on L5, and trace grade 1  retrolisthesis of L2 on L3. Substantial lower lumbar facet  arthropathy.    Clinical context:   left parotid anterior mass- pleomorphic adenoma (per Epic pleomorphic  adenoma).  Nearby hypermetabolic lymph nodes on the recent FNA [facility] 4/16/2025 malignancy consistent with urothelial cell carcinoma.   Distal left ureteral narrowing of biopsy negative, presumed sampling  error.        Impression    IMPRESSION:  In summary: Complex exam.   - Left cervical node is growing, (FNA urothelial cancer, atypical  location)  - Distal left ureter - equivocal FDG foci, retained urine vs.  urothelial cancer  - Right prostate FDG foci - concern for prostate recurrence    In detail:   1 - Left cervical node (fna - urothelial cancer) - increased  in size.   (Left 2A lymph node measuring 1.6 x 1.3 cm, max SUV 16.12)   1a. New foci in the parotid gland, hypermetabolic.  However, could be  secondary to inflammation possibly from recent FNA.    1b. The enhancing lesion anteriorly in the parotid is not  hypermetabolic and was previously proven to be a pleomorphic adenoma.  1c. A single isolated metastasis to a cervical node from urothelial  cancer is very uncommon.    2 - Prostate - Focal intense uptake near the right posterior apex  peripheral zone, max SUV 10.97.  (rising PSA, prior hx. of prostate  ca.).  Concerning for Prostate recurrence.     3 - Distal left ureter - indeterminant focus of hypermetabolic uptake  which persists on post lasix imaging.  Potentially, this could  represent urothelial cancer (given cervical node).  However, it could  be a false positive, retained FDG in the urine proximal to the  stenosis.  This area also did not have significant uptake on choline  scan 3/10/2025 and choline is 92% sensitive for urothelial cancer.   (bx. was previously negative w/possible sampling error.)    4 - Bladder - difficult to assess given the artifact from the right  hip implant.  (recent cystoscopy negative). A few foci of  hypermetabolism on both pre and post Lasix however with the presence  of a Barron catheter could represent retained urine.    5. Foci of hypermetabolism in the sigmoid colon near the left ureter,  differential focal diverticulitis versus polyp versus cancer.      I have personally reviewed the examination and initial interpretation  and I agree with the findings.    KATIE BROWN MD         SYSTEM ID:  [id number]       IMPRESSION/PLAN: Stage IV urothelial carcinoma of the distal left ureter.  There is a a 6.2 cm segment of abnormal diffuse urothelial thickening and enhancement in the distal left ureter noted on CT scan (02/03/2025) with metastases to left cervical lymph node detected on FDG PET/CT scan (05/07/2025) and left  cervical lymph node biopsy (04/16/2025).  There is left hydroureteronephrosis requiring left ureteral stent placement (02/25/2025) and patient also has a chronic indwelling Barron for prior urinary retention. First-line therapy typically consists of enfortumab vedotin 1.25 mg/kg IV day 1, 8 and pembrolizumab 200 mg IV day 1 every 21 days in accordance with the EV-302 clinical trial (N Engl J Med 2024;[id number]).  Patient is elderly with an ECOG 2-3 performance status, and he is chemotherapy ineligible, and enfortumab vedotin is associated with significant adverse effects including peripheral neuropathy that would lead to considerable morbidity.  Patient will receive first-line pembrolizumab 200 mg IV every 21 days to induce a response and improve progression free survival in accordance with the phase 3 KEYNOTE-361 trial ([employer], 2017; 18 (11):1851-1020). Patient will return to the outpatient infusion center 05/15/2025, for cycle 1 of pembrolizumab. I reviewed the risks and side effects of pembrolizumab with the patient, which include fatigue, weakness, anorexia, weight loss, rash, pruritus, dry eyes, dry mouth, headache, cough, dyspnea, abdominal pain, nausea, vomiting, diarrhea, hypothyroidism, adrenal insufficiency, other endocrine disorders, cardiomyopathy, colitis, nephritis, pancreatitis, myositis, arthritis, neuropathy, cerebritis.  Patient understood the indication and risks and agreed to proceed.  Patient education will be scheduled to review pembrolizumab administration and side effect management.  Prescriptions for ondansetron 8 mg and loperamide 2 mg were sent to the pharmacy today. Patient will return to clinic in 4 weeks with CBC, metabolic panel, amylase, lipase, TSH, free T4, cortisol. 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       Roni Wilson MD    cc: MD Tacho Ingram, MD Perico Jacobs

## 2025-04-29 NOTE — TELEPHONE ENCOUNTER
RECORDS STATUS - ALL OTHER DIAGNOSIS      RECORDS RECEIVED FROM: [facility], [facility]   NOTES STATUS DETAILS   OFFICE NOTE from referring provider Epic 4/25/2025 - Dr. Jama Ray    OFFICE NOTE from urology [facility]  Ext: [facility]  [place] 4/1/2025 - Dr. Rayray Mandujano    [facility]:  2/3/2025 - Dr. Fox Meade    [place]:  3/11/2025 - Dr. Perico Novoa  6/22/2021 - Dr. Ever Cueto   OFFICE NOTE from medical oncologist [facility] 9/9/2019 - Dr. Andrae Salvador  7/18/2016 - Dr. Irene Arrington   DISCHARGE SUMMARY from hospital [facility] 2/25/2025 - [facility]   OPERATIVE REPORT [facility] 2/25/2025 - CYSTOURETEROSCOPY, retrograde pyelogram. left ureteral biopsy, stent placement left ureter (Left: Urethra)    MEDICATION LIST [facility]    LABS     PATHOLOGY REPORTS Req from [facility] Fall:  4/16/2025 - NR-    Epic:  2/25/2025 - [id number]   2/25/2025 - [id number]    ANYTHING RELATED TO DIAGNOSIS [facility] 4/16/2025   PATHOLOGY [facility] TRACKING   TRACKING #:  [id number]   IMAGING (NEED IMAGES & REPORT)     CT SCANS PACS CT Abdomen Pelvis: 2/3/2025  CT Pelvis: 1/10/2023, 1/6/2022   MRI     XRAYS     ULTRASOUND PACS [place]:  US Lymph Node Bx: 4/16/2025    PACS:  US Renal: 1/20/2025   PET PACS [place]:  PET CT [place]: 3/10/2025, 5/22/2024, 12/27/2023, 8/18/2023, 5/12/2023    PACS:  PET CT [place]: 12/12/2024-1/25/2019  PET Skull to Thigh: 8/29/2022   IMAGE DISC [facility] TRACKING   TRACKING #:

## 2025-05-05 ENCOUNTER — LAB REQUISITION (OUTPATIENT)
Dept: LAB | Facility: CLINIC | Age: OVER 89
End: 2025-05-05
Payer: MEDICARE

## 2025-05-05 PROCEDURE — 88321 CONSLTJ&REPRT SLD PREP ELSWR: CPT | Mod: GC | Performed by: STUDENT IN AN ORGANIZED HEALTH CARE EDUCATION/TRAINING PROGRAM

## 2025-05-06 LAB
PATH REPORT.COMMENTS IMP SPEC: ABNORMAL
PATH REPORT.COMMENTS IMP SPEC: YES
PATH REPORT.FINAL DX SPEC: ABNORMAL
PATH REPORT.GROSS SPEC: ABNORMAL
PATH REPORT.MICROSCOPIC SPEC OTHER STN: ABNORMAL
PATH REPORT.RELEVANT HX SPEC: ABNORMAL
PATH REPORT.RELEVANT HX SPEC: ABNORMAL
PATH REPORT.SITE OF ORIGIN SPEC: ABNORMAL

## 2025-05-07 ENCOUNTER — ANCILLARY PROCEDURE (OUTPATIENT)
Dept: PET IMAGING | Facility: CLINIC | Age: OVER 89
End: 2025-05-07
Attending: UROLOGY
Payer: MEDICARE

## 2025-05-07 DIAGNOSIS — C66.2 URETER CANCER, LEFT (H): ICD-10-CM

## 2025-05-07 LAB
CREAT BLD-MCNC: 1.3 MG/DL (ref 0.5–1.2)
GFR SERPL CREATININE-BSD FRML MDRD: 53 ML/MIN/{1.73_M2}
GLUCOSE SERPL-MCNC: 99 MG/DL (ref 70–99)

## 2025-05-07 RX ORDER — FLUDEOXYGLUCOSE F 18 200 MCI/ML
1-18 INJECTION, SOLUTION INTRAVENOUS ONCE
Status: COMPLETED | OUTPATIENT
Start: 2025-05-07 | End: 2025-05-07

## 2025-05-07 RX ORDER — FUROSEMIDE 10 MG/ML
40 INJECTION INTRAMUSCULAR; INTRAVENOUS ONCE
Status: COMPLETED | OUTPATIENT
Start: 2025-05-07 | End: 2025-05-07

## 2025-05-07 RX ORDER — IOPAMIDOL 755 MG/ML
50-135 INJECTION, SOLUTION INTRAVASCULAR ONCE
Status: COMPLETED | OUTPATIENT
Start: 2025-05-07 | End: 2025-05-07

## 2025-05-07 RX ADMIN — IOPAMIDOL 113 ML: 755 INJECTION, SOLUTION INTRAVASCULAR at 10:27

## 2025-05-07 RX ADMIN — FUROSEMIDE 40 MG: 10 INJECTION INTRAMUSCULAR; INTRAVENOUS at 10:27

## 2025-05-07 RX ADMIN — FLUDEOXYGLUCOSE F 18 14.95 MILLICURIE: 200 INJECTION, SOLUTION INTRAVENOUS at 09:04

## 2025-05-07 NOTE — DISCHARGE INSTRUCTIONS

## 2025-05-08 ENCOUNTER — ONCOLOGY VISIT (OUTPATIENT)
Dept: ONCOLOGY | Facility: CLINIC | Age: OVER 89
End: 2025-05-08
Attending: UROLOGY
Payer: MEDICARE

## 2025-05-08 ENCOUNTER — PRE VISIT (OUTPATIENT)
Dept: ONCOLOGY | Facility: CLINIC | Age: OVER 89
End: 2025-05-08
Payer: MEDICARE

## 2025-05-08 VITALS
HEART RATE: 66 BPM | OXYGEN SATURATION: 97 % | BODY MASS INDEX: 29.64 KG/M2 | DIASTOLIC BLOOD PRESSURE: 73 MMHG | SYSTOLIC BLOOD PRESSURE: 150 MMHG | WEIGHT: 195.6 LBS | TEMPERATURE: 98.3 F | HEIGHT: 68 IN | RESPIRATION RATE: 16 BRPM

## 2025-05-08 DIAGNOSIS — C77.0 METASTASIS TO CERVICAL LYMPH NODE (H): ICD-10-CM

## 2025-05-08 DIAGNOSIS — Z79.899 HIGH RISK MEDICATION USE: ICD-10-CM

## 2025-05-08 DIAGNOSIS — C66.2 CANCER OF LEFT URETER (H): Primary | ICD-10-CM

## 2025-05-08 PROCEDURE — G0463 HOSPITAL OUTPT CLINIC VISIT: HCPCS | Performed by: INTERNAL MEDICINE

## 2025-05-08 RX ORDER — HEPARIN SODIUM,PORCINE 10 UNIT/ML
5-20 VIAL (ML) INTRAVENOUS DAILY PRN
OUTPATIENT
Start: 2025-05-15

## 2025-05-08 RX ORDER — EPINEPHRINE 1 MG/ML
0.3 INJECTION, SOLUTION INTRAMUSCULAR; SUBCUTANEOUS EVERY 5 MIN PRN
OUTPATIENT
Start: 2025-05-15

## 2025-05-08 RX ORDER — MEPERIDINE HYDROCHLORIDE 25 MG/ML
25 INJECTION INTRAMUSCULAR; INTRAVENOUS; SUBCUTANEOUS
OUTPATIENT
Start: 2025-05-15

## 2025-05-08 RX ORDER — METHYLPREDNISOLONE SODIUM SUCCINATE 40 MG/ML
40 INJECTION INTRAMUSCULAR; INTRAVENOUS
Start: 2025-05-15

## 2025-05-08 RX ORDER — ALBUTEROL SULFATE 0.83 MG/ML
2.5 SOLUTION RESPIRATORY (INHALATION)
OUTPATIENT
Start: 2025-05-15

## 2025-05-08 RX ORDER — ONDANSETRON 8 MG/1
8 TABLET, FILM COATED ORAL EVERY 8 HOURS PRN
Qty: 60 TABLET | Refills: 5 | Status: SHIPPED | OUTPATIENT
Start: 2025-05-08

## 2025-05-08 RX ORDER — HEPARIN SODIUM (PORCINE) LOCK FLUSH IV SOLN 100 UNIT/ML 100 UNIT/ML
5 SOLUTION INTRAVENOUS
OUTPATIENT
Start: 2025-05-15

## 2025-05-08 RX ORDER — DIPHENHYDRAMINE HYDROCHLORIDE 50 MG/ML
25 INJECTION, SOLUTION INTRAMUSCULAR; INTRAVENOUS
Start: 2025-05-15

## 2025-05-08 RX ORDER — LOPERAMIDE HYDROCHLORIDE 2 MG/1
2 TABLET ORAL 4 TIMES DAILY PRN
Qty: 60 TABLET | Refills: 5 | Status: SHIPPED | OUTPATIENT
Start: 2025-05-08

## 2025-05-08 RX ORDER — DIPHENHYDRAMINE HYDROCHLORIDE 50 MG/ML
50 INJECTION, SOLUTION INTRAMUSCULAR; INTRAVENOUS
Start: 2025-05-15

## 2025-05-08 RX ORDER — ALBUTEROL SULFATE 90 UG/1
1-2 INHALANT RESPIRATORY (INHALATION)
Start: 2025-05-15

## 2025-05-08 RX ORDER — LORAZEPAM 2 MG/ML
0.5 INJECTION INTRAMUSCULAR EVERY 4 HOURS PRN
OUTPATIENT
Start: 2025-05-15

## 2025-05-08 ASSESSMENT — PAIN SCALES - GENERAL: PAINLEVEL_OUTOF10: SEVERE PAIN (7)

## 2025-05-08 NOTE — LETTER
5/8/2025      Shailesh Hartman  [address] [facility] [address]      Dear Colleague,    Thank you for referring your patient, Shailesh Hartman, to the [facility] [place] [facility]. Please see a copy of my visit note below.      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  Patient presents with urinary retention and left hydroureteronephrosis.  CT abdomen, pelvis 02/03/2025, revealed moderate left hydroureteronephrosis with ureter dilation to the level of the distal ureter and a 6.2 cm segment of abnormal diffuse urothelial thickening and enhancement in the distal left ureter.  There was mild prostatomegaly with prostate fiducial markers in place.  Urine cytology 02/25/2025, revealed atypical cells.  Patient underwent cystoscopy ureteroscopy, retrograde left pyelogram, left ureteral biopsy and left ureteral stent placement 02/25/2025, that revealed fibrotic stromal tissue with close side effect and chronic inflammation without evidence of malignancy.  11-C choline PET/CT scan performed for surveillance for previously diagnosed prostate cancer 12/12/2024, revealed stable diffuse choline uptake in the prostate with SUV max 4.  There was a 1.2 cm moderately choline-avid level 2 left cervical lymph node.  There is no other choline-avid lymphadenopathy.  CT neck 12/31/2024, revealed a 1.2 x 1.6 x 1.7 cm level 2A left cervical lymph node corresponding to the choline-avid lymph node on prior 11-C choline PET/CT scan.  There was a 2 x 1.3 x 1.8 cm well-circumscribed enhancing homogeneous mass in the left parotid that was not choline-avid on the prior PET/CT scan. Repeat 11-C choline PET/CT scan 03/10/2025, revealed faint choline uptake throughout the atrophic prostate  that was unchanged.  There was an intensely choline-avid level 2A left cervical lymph node.  There was also an enhancing mass in the left parotid.  There are no other choline-avid lesions. Ultrasound-guided left cervical lymph node core needle biopsy 04/16/2025, revealed malignant epithelioid cells with focal areas of necrosis positive for CK AE1/AE3, GATA3, and p63 (weak), but negative for PSA and NKX3.1 consistent with urothelial carcinoma.  Pathology review at the [facility] revealed metastatic carcinoma with morphology and immunohistochemistry suggesting primary carcinoma from the salivary gland, breast, or urothelium depending on clinical and imaging correlation. 18-F FDG PET/CT scan 05/07/2025, revealed a 1.6 x 1.3 cm level 2A left cervical lymph node with SUV max 16.12 that was a previously biopsied metastatic urothelial carcinoma. There was a 1.6 x 1.5 cm right submandibular lymph node with SUV max 3.82.  There was a 2.1 x 1.3 cm enhancing anterior left parotid lymph node with SUV max 3.99 that was a previously biopsied pleomorphic adenoma.  There was mild left pelvocaliectasis with diffuse pelvicalyceal and ureteral FDG uptake favoring FDG tracer excretion through the ureteral stent, with a focus of hypermetabolism in the distal left ureter proximal to the ureteral stenosis that was indeterminate but in the location of the presumed primary lesion.  There was focal intense FDG uptake near the right posterior apex peripheral zone of the prostate with SUV max 5.3, without CT correlate. Patient has mild fatigue, decrease in exercise tolerance, muscle weakness particularly in the lower extremities, imbalance, mild decrease in appetite, 40-pound weight loss in the past year, and an indwelling Barron catheter due to urinary retention. He is independent and is able to perform his activities of daily living and some chores.  Patient uses walking sticks for balance and mobility.  He denies fever,  headache, cough, dyspnea, chest pain, abdominal pain, nausea, vomiting, constipation, diarrhea, bleeding, or bone pain.     PAST HISTORY:   -Hypertension.  -Hyperlipidemia.  -Atrial fibrillation status post ablation of the right atrial cavotricuspid isthmus, 06/30/2023.  -AV block status post dual-chamber permanent pacemaker implantation , 06/30/2023.  -Stage 3b chronic kidney disease.  -Stage IV urothelial carcinoma of the distal left ureter (cT3, cN0, cM1).  -Stage CORY adenocarcinoma prostate (cT3a, cN1, cM0, PSA 14.2 ng/mL, grade group 4). Patient presented with a prostate nodule identified on physical exam in 2013.  PSA was 14.2 ng/mL (09/2013).  Transrectal ultrasound-guided prostate core needle biopsy 09/30/2013, revealed a Granite 4+4= 8 adenocarcinoma involving 3% of the core needle biopsy samples from the left lateral base.  There was a Robert 4+3 equal 7 adenocarcinoma involving 50% of the core needle biopsy sample from the right apex, 50% of 2 of 2 biopsy samples with perineural invasion from the right mid, 90% of 2 of 2 biopsy samples from the right mid lateral.  There was a Robert 3+4=7 adenocarcinoma involving 20% of the core needle biopsy sample from the right base, 20% of the biopsy sample with perineural invasion from the right lateral base, 50% of the biopsy sample with perineural invasion from the right anterior apex.  There is a Granite 3+3=6 adenocarcinoma involving 5% of the core needle biopsy sample from the left anterior apex.  There was high-grade prostatic intraepithelial neoplasm in the biopsy sample from the left apex, left mid, and left base.  There was benign prostatic tissue in the core needle biopsy sample from the left mid lateral (10/14 samples involved).  CT abdomen, pelvis 10/17/2013, was negative for adenopathy or metastases.  There was prostatomegaly.  Bone scan 10/17/2013, was negative for metastases.  There was mild uptake in the thoracic and lumbar spine due to degenerative  changes.  MRI pelvis 11/26/2013, revealed abnormal T2 hypointensity in the medial lateral aspect of the mid and apical peripheral zone on the right, and medial base and midportion of the left peripheral zone.  There was similar-appearing T2 hypointensity in the adjacent transitional zone.  There was extracapsular extension involving the right and left pseudocapsule and possible involvement of the base of the left seminal vesicle (PI-RADS 5).  There was no lymphadenopathy or suspicious osseous lesions. Patient received definitive external beam radiation therapy to the prostate and seminal vesicles from 02/03/2014 through 04/02/2014.  There was a PSA julisa of 1.21 ng/mL (12/02/2014) with subsequent biochemical progression with PSA 1.96 ng/mL (10/21/2015), PSA 3.64 ng/mL (0/13/2016).  CT abdomen, pelvis 0/28/2016, revealed a 1.5 x 1.2 cm left periaortic lymph node, a 0.8 cm right common iliac lymph node, a 1.6 x 0.8 cm right external iliac lymph node, and a 0.9 x 0.4 cm right internal iliac lymph node. CT-guided right iliac lymph node core needle biopsy 05/12/2016, revealed prostate adenocarcinoma positive for PSA.  Bone scan 06/10/2016, was negative for metastases.  Patient underwent robotic-assisted bilateral pelvic lymph node dissection 06/29/2016, that revealed 3 of 5 right external iliac lymph nodes and 1 of 5 left external iliac lymph nodes positive for metastatic prostate adenocarcinoma (4 of 10 lymph nodes involved).  Restaging 11-C choline PET/CT scan 11/04/2016 revealed prominent confluent left periaortic retroperitoneal lymph nodes with moderate choline activity, small choline-avid right common iliac lymph nodes, and small choline-avid right internal iliac lymph nodes.  There was moderate heterogeneous activity throughout the prostate gland greatest in the left lobe.  There were no other choline-avid lesions.  Patient received androgen deprivation therapy (ADT) x1 year consisting of degarelix loading dose  240 mg on 11/30/2016, then leuprolide 22.5 mg every 3 months from 12/29/2016 through 11/07/2017, concurrent with radiation therapy to the pelvic and para-aortic lymph nodes (30 Gy in 25 fractions) from 03/20/2017 through 05/01/2017.  PSA was undetectable (03/31/2017) followed by PSA recovery to 0.14 ng/mL (07/19/2018), PSA 0.7 ng/mL (01/25/2019), PSA 0.84 ng/mL (03/02/2020), PSA 0.87 ng/mL (04/02/2021), PSA 1.1 ng/mL (04/22/2022), PSA 1.2 ng/mL (05/15/2023), PSA 1 ng/mL (01/02/2024) PSA 1.3 ng/mL (03/11/2025). Serial 11-C choline PET/CT scan 11/24/2017, 09/21/2018, 01/25/2019, 06/05/2019, 10/31/2019, 03/02/2020, 07/10/2020, 11/11/2020, 0/02/2021, 08/03/2021, 12/08/2021, 04/22/2022, 12/30/2022, 05/12/2023, 08/18/2023, 12/27/2023, 05/23/2024, 09/11/2024, showed stable patchy uptake in the prostate, and focal moderate choline activity in the T2 and T4 vertebral bodies that was stable. 18-F DCFPyl PET/CT scan 08/29/2022, revealed focal PSMA uptake along the posterior margin of the right prostate.  There were no other PSMA avid lesions.  -Excision of basal cell skin cancer.  -History of herpes zoster left V1 distribution 05/29/2020.   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  -Colon polyp. A tubular adenoma was removed from the ascending, transverse, and rectosigmoid colon at the time of colonoscopy 05/17/2023.  -Diverticulosis.  -External hemorrhoids.   -Gout.  -Osteoporosis.  -Nasal fracture, 1989  -Left sacroiliac fusion, 03/10/2022.  -L2-L5 laminectomy,  L4-L5, decompression,  transforminal lumbar interbody fusion, instrumentation, and bone graft, 11/16/2021.  -Cataract extraction, intraocular lens implant, both eyes, 07/20/2021 and 07/27/2021.  -Right total hip replacement, 2007.  -L4-L5 right hemilaminectomy and foraminotomy, 11/08/2005.   -Tonsillectomy, adenoidectomy, 1939.    ALLERGIES: No known drug allergies.    MEDICATIONS:   Current Outpatient Medications   Medication Sig Dispense Refill      acetaminophen (TYLENOL) 500 MG tablet Take 500-1,000 mg by mouth 3 times daily.       allopurinol (ZYLOPRIM) 300 MG tablet Take 300 mg by mouth daily       lisinopril (PRINIVIL,ZESTRIL) 20 MG tablet Take 20 mg by mouth 2 times daily       melatonin 3 MG tablet Take 1.5 mg by mouth nightly as needed for sleep.       Multiple Vitamin (MULTI-VITAMINS) TABS 1 tab by mouth daily       oxyCODONE (ROXICODONE) 5 MG tablet Take 1-2 tablets (5-10 mg) by mouth every 4 hours as needed for moderate to severe pain. 6 tablet 0     potassium chloride ER (K-TAB/KLOR-CON) 10 MEQ CR tablet TAKES 10 MEQ BID  1     simvastatin (ZOCOR) 20 MG tablet Take 1 tablet by mouth At Bedtime       triamterene-HCTZ (DYAZIDE) 37.5-25 MG capsule TK 1 C PO D  3     benzoyl peroxide 5 % external liquid Use in the shower once per day (Patient not taking: Reported on 5/8/2025) 236 mL 11     bisacodyl (DULCOLAX) 5 MG EC tablet Take 2 tablets at 3 pm the day before your procedure. If your procedure is before 11 am, take 2 additional tablets at 11 pm. If your procedure is after 11 am, take 2 additional tablets at 6 am. For additional instructions refer to your colonoscopy prep instructions. (Patient not taking: Reported on 5/8/2025) 4 tablet 0     ciprofloxacin (CIPRO) 250 MG tablet Take 250 mg by mouth 2 times daily. (Patient not taking: Reported on 5/8/2025)       clindamycin (CLEOCIN T) 1 % external lotion Apply topically 2 times daily. Apply to pink bumps until itch has resolved (Patient not taking: Reported on 5/8/2025) 60 mL 3     diphenhydrAMINE-acetaminophen (TYLENOL PM)  MG tablet Take 1 tablet by mouth nightly as needed for sleep. (Patient not taking: Reported on 5/8/2025)       hydrochlorothiazide (MICROZIDE) 12.5 MG capsule Take 12.5 mg by mouth daily. (Patient not taking: Reported on 5/8/2025)       ketoconazole (NIZORAL) 2 % external shampoo Apply to the scalp for 5 minutes in the shower three times a week (Patient not taking: Reported  on 2025) 120 mL 3     phenazopyridine (PYRIDIUM) 200 MG tablet Take 1 tablet (200 mg) by mouth 3 times daily as needed for irritation. Take for bladder pain (Patient not taking: Reported on 2025) 9 tablet 0     senna-docusate (SENOKOT-S/PERICOLACE) 8.6-50 MG tablet Take 1-2 tablets by mouth 2 times daily. (Patient not taking: Reported on 2025) 30 tablet 0     tamsulosin (FLOMAX) 0.4 MG capsule Take 1 capsule (0.4 mg) by mouth daily as needed. Take for stent discomfort or for kidney stone passage (Patient not taking: Reported on 2025) 30 capsule 0     tolterodine (DETROL) 2 MG tablet Take 1 tablet (2 mg) by mouth every 12 hours as needed for incontinence (Spasms). (Patient not taking: Reported on 2025) 30 tablet 0     triamcinolone (KENALOG) 0.1 % external ointment Apply topically 2 times daily. Apply to the right lower leg and all other itchy pink spots on the body (Patient not taking: Reported on 2025) 80 g 3       FAMILY HISTORY: Father  age 72 of pancreas cancer.  Mother  in her 70s of mesenteric cancer.  There is 1 sister Sophy who  at age 58 of a cerebral aneurysm.  There is a half brother (from father) Jun age 72 with osteoarthritis.  There is 1 daughter age 65 with history of colon cancer.  There are 2 sons: 1 son age 63 with diabetes; another son age 52 is alive and well.    SOCIAL HISTORY: Patient was born and raised in [facility] [facility] [place].  He relocated to [place] in .  Patient is a  from his first spouse and  to his current spouse since  and lives in [place] with his spouse.  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  He received his medical degree from the [employer].  He has a 10-pack-year smoking history and quit at age 25.  He drinks wine rarely.  There is no  service.    Social History     Tobacco Use     Smoking status: Former     Smokeless tobacco: Never   Substance Use Topics     Alcohol use: Not  "Currently     Drug use: Never       REVIEW OF SYSTEMS: Review of systems reviewed with the patient and otherwise negative except for those detailed above.    PHYSICAL EXAM:  BP (!) 150/73 (BP Location: Right arm, Patient Position: Sitting, Cuff Size: Adult Large)   Pulse 66   Temp 98.3  F (36.8  C) (Oral)   Resp 16   Ht 1.716 m (5' 7.56\")   Wt 88.7 kg (195 lb 9.6 oz)   SpO2 97%   BMI 30.13 kg/m  .  Body surface area is 2.06 meters squared.  ECOG performance status: 2.  Skin: No erythema or rash.  HEENT: Sclera nonicteric.  Conjunctiva normal.  Pupils equal round reactive.  Nose clear.  Tongue and uvula midline.  Oropharynx without lesions or ulceration, mucosa pink and moist.  Neck: Supple without masses.  Nodes: No cervical, supraclavicular, axillary, or inguinal adenopathy.  Lungs: No dullness to percussion.  No rales, wheezes, rhonchi.  Heart: Regular rate and rhythm.  Abdomen: Bowel sounds present.  Soft, nontender, no hepatosplenomegaly or mass.  Extremities: No edema.  Neurologic: There is 4/5 strength in the lower extremities affecting ambulation.  Sensory and cerebellar normal.     IMAGING REVIEWED:   PET Oncology Whole Body    Narrative    Combined Report of: PET and CT on 5/7/2025 11:27 AM:     FOLLOW-UP APPOINTMENT: 5/8/2025    1. PET of the neck, chest, abdomen, and pelvis.  2. PET CT Fusion for Attenuation Correction and Anatomical  Localization.  3. Diagnostic CT of the chest, abdomen and pelvis with intravenous  contrast obtained for diagnostic interpretation.  4. 3D MIP and PET-CT fused images were processed on an independent  workstation and archived to PACS and reviewed by a radiologist.    Technique:    1. PET: The patient received 14.95 mCi of F-18-FDG. The serum glucose  was 99 mg/dL prior to administration. Body weight was 86.2 kg. Images  were evaluated in the axial, sagittal, and coronal planes as well as  the rotational whole body MIP. Images were acquired from cranial  vertex to " feet.    UPTAKE WAS MEASURED AT 69 MINUTES.     2. CT: Volumetric acquisition for clinical interpretation of the  chest, abdomen, and pelvis acquired at 3 mm sections. The chest,  abdomen, and pelvis were evaluated at 5 mm sections in bone, soft  tissue, and lung windows.    Contrast and Medications:  IV contrast: 113 mL of Isovue 370 intravenously.  PO contrast: 16 oz. H20  Additional Medications: Lasix 40 mg IV    3. 3D MIP and PET-CT fused images were processed on an independent  workstation and archived to PACS and reviewed by a radiologist.    INDICATION: Ureter cancer, left (H).    ADDITIONAL INFORMATION OBTAINED FROM EMR:  History of prostate cancer: distant remote external beam radiation,  positive pelvic node in 2016, 12/2024 choline PET at [facility]  showed no evidence of metastatic disease. 3/10/2025 choline PET showed  nonspecific cervical lymph node activity.    History of left ureteral stricture: left distal ureteral thickening on  [facility] CT 2/3/2025 with biopsy negative for malignancy.     FNA of neck lymph nodes showed malignancy consistent with urothelial  cell carcinoma at the [facility].    COMPARISON: PET CT CHOLINE 3/10/2025    FINDINGS:     BACKGROUND: Liver SUV max = 2.85, Aorta Blood SUV max = 2.24.       HEAD/NECK:  - Mild increase in size of an enhancing 2.1 x 1.3 cm anteriorly in the  left parotid lymph node with max SUV of 3.99 (series 2/3 image 98).   Presumably this is previously biopsied pleomorphic adenoma.  - Focus of intense SUV uptake in the left inferomedial parotid region,  indeterminant may represent an additional lymph node versus post FNA  inflammation if the parotid was traversed, max SUV 10.30 (series 2/3  image 106).  This was not seen on recent choline PET.  - No significant change in size of a left 2A lymph node measuring 1.6  x 1.3 cm, max SUV 16.12 (series 2/3 image 113). (Presumably the node  which si FNA positive for urothelial cancer)  - More  conspicuous 1.6 x 1.5 cm right submandibular mildly FDG avid  lymph node (max SUV 3.82, series 2/3 [id number]), with recent choline  PET CT 5 mm slice thickness having a lower spatial resolution.    CHEST:    Lymph nodes: No FDG avid or abnormally enlarged lymph nodes.    Lungs: The central tracheobronchial tree is clear.  Stable elevation  of the right hemidiaphragm. Similar mild dependent atelectasis. No  consolidation or suspicious airspace opacities. No suspicious or FDG  avid pulmonary nodule/mass. No pleural effusion or pneumothorax.    Heart and great vessels: Left chest wall dual-lead pacer device.  Grossly normal thyroid. Normal heart size. No pericardial effusion.  Atherosclerotic coronary artery calcifications, including the LAD.  Normal diameters of the main pulmonary artery and thoracic aorta.  Aortic calcifications. Unremarkable esophagus.    ABDOMEN AND PELVIS:    Liver: No FDG avid lesion. Unchanged hepatic cysts and subcentimeter  hypodensities, some of which too small to characterize. No suspicious  lesion or mass.    Pancreas: Fatty atrophy. No pancreatic ductal dilatation.     Spleen: No FDG avid lesion. Accessory splenules. Splenic size is  within normal limits.    Adrenal glands: No FDG avid foci. No nodule or mass.    Kidneys: Left double J ureteral stent in place. Mild left  pelvocaliectasis with diffuse pelvocalyceal and ureteral increased FDG  uptake/activity, nonspecific, favored to represent FDG tracer  excretion throughout the stented ureter, though possibly related to a  component of infection/inflammation and cannot rule out a urothelial  cancer. On post lasix imaging there is a focus of hypermetabolism in  the distal left ureter which appears to be proximal to the stenosis,  again cancer versus retained FDG in urine. There is no significant  residual thickening and enhancement involving the distal ureter  although the presence of the stent limits evaluation.  Substantial  decrease in  the mild residual pelvocaliectasis in relation to  pre-stent CT 2/3/2025.    No right hydronephrosis or appreciable urothelial enhancement.     The urinary bladder is partially decompressed with Barron catheter in  place. There is substantial pelvic streak artifact which partially  obscures evaluation.    Prostate & Pelvis: Substantial streak artifact from right hip  arthroplasty. Focal intense uptake near the right posterior apex  peripheral zone without definite CT correlate, with max SUV 10.97  (series 2/3 image 4 and 17).    Gastrointestinal system: Normal caliber of the small and large bowel.  Normal appendix. Foci of hypermetabolism in the sigmoid colon, left  midline near the ureter series 2 image 372 Max SUV 5.3.  Extensive  diverticulosis.    Lymph nodes: No definite FDG avid or abnormally enlarged lymph nodes,  with substantial streak artifact in the pelvis.    Vascular structures: Normal caliber of the abdominal aorta. Aortoiliac  atherosclerotic calcifications.    No free air, free fluid, or fluid collection.     EXTREMITIES:   No suspicious FDG uptake in the visualized extremities. Scattered  degenerative changes and nonspecific uptake surrounding the right hip  arthroplasty. Left SI joint fusion with intact hardware.    BONES AND SOFT TISSUES:     No suspicious FDG uptake in the skeleton. No abnormal lytic or blastic  osseous lesions. Substantial degenerative changes of the spine. Stable  grade 1 degenerative anterolisthesis of L4 on L5, and trace grade 1  retrolisthesis of L2 on L3. Substantial lower lumbar facet  arthropathy.    Clinical context:   left parotid anterior mass- pleomorphic adenoma (per Epic pleomorphic  adenoma).  Nearby hypermetabolic lymph nodes on the recent FNA [facility] 4/16/2025 malignancy consistent with urothelial cell carcinoma.   Distal left ureteral narrowing of biopsy negative, presumed sampling  error.        Impression    IMPRESSION:  In summary: Complex exam.   - Left  cervical node is growing, (FNA urothelial cancer, atypical  location)  - Distal left ureter - equivocal FDG foci, retained urine vs.  urothelial cancer  - Right prostate FDG foci - concern for prostate recurrence    In detail:   1 - Left cervical node (fna - urothelial cancer) - increased in size.   (Left 2A lymph node measuring 1.6 x 1.3 cm, max SUV 16.12)   1a. New foci in the parotid gland, hypermetabolic.  However, could be  secondary to inflammation possibly from recent FNA.    1b. The enhancing lesion anteriorly in the parotid is not  hypermetabolic and was previously proven to be a pleomorphic adenoma.  1c. A single isolated metastasis to a cervical node from urothelial  cancer is very uncommon.    2 - Prostate - Focal intense uptake near the right posterior apex  peripheral zone, max SUV 10.97.  (rising PSA, prior hx. of prostate  ca.).  Concerning for Prostate recurrence.     3 - Distal left ureter - indeterminant focus of hypermetabolic uptake  which persists on post lasix imaging.  Potentially, this could  represent urothelial cancer (given cervical node).  However, it could  be a false positive, retained FDG in the urine proximal to the  stenosis.  This area also did not have significant uptake on choline  scan 3/10/2025 and choline is 92% sensitive for urothelial cancer.   (bx. was previously negative w/possible sampling error.)    4 - Bladder - difficult to assess given the artifact from the right  hip implant.  (recent cystoscopy negative). A few foci of  hypermetabolism on both pre and post Lasix however with the presence  of a Barron catheter could represent retained urine.    5. Foci of hypermetabolism in the sigmoid colon near the left ureter,  differential focal diverticulitis versus polyp versus cancer.      I have personally reviewed the examination and initial interpretation  and I agree with the findings.    KATIE BROWN MD         SYSTEM ID:  [id number]       IMPRESSION/PLAN: Stage IV  urothelial carcinoma of the distal left ureter.  There is a a 6.2 cm segment of abnormal diffuse urothelial thickening and enhancement in the distal left ureter noted on CT scan (02/03/2025) with metastases to left cervical lymph node detected on FDG PET/CT scan (05/07/2025) and left cervical lymph node biopsy (04/16/2025).  There is left hydroureteronephrosis requiring left ureteral stent placement (02/25/2025) and patient also has a chronic indwelling Barron for prior urinary retention. First-line therapy typically consists of enfortumab vedotin 1.25 mg/kg IV day 1, 8 and pembrolizumab 200 mg IV day 1 every 21 days in accordance with the EV-302 clinical trial (N Engl J Med 2024;390:875-888).  Patient is elderly with an ECOG 2-3 performance status, and he is chemotherapy ineligible, and enfortumab vedotin is associated with significant adverse effects including peripheral neuropathy that would lead to considerable morbidity.  Patient will receive first-line pembrolizumab 200 mg IV every 21 days to induce a response and improve progression free survival in accordance with the phase 3 KEYNOTE-361 trial ([employer], 2017; 18 (11):2105-6968). Patient will return to the outpatient infusion center 05/15/2025, for cycle 1 of pembrolizumab. I reviewed the risks and side effects of pembrolizumab with the patient, which include fatigue, weakness, anorexia, weight loss, rash, pruritus, dry eyes, dry mouth, headache, cough, dyspnea, abdominal pain, nausea, vomiting, diarrhea, hypothyroidism, adrenal insufficiency, other endocrine disorders, cardiomyopathy, colitis, nephritis, pancreatitis, myositis, arthritis, neuropathy, cerebritis.  Patient understood the indication and risks and agreed to proceed.  Patient education will be scheduled to review pembrolizumab administration and side effect management.  Prescriptions for ondansetron 8 mg and loperamide 2 mg were sent to the pharmacy today. Patient will return to clinic in  4 weeks with CBC, metabolic panel, amylase, lipase, TSH, free T4, cortisol. The current and past history, clinical evaluation, reviewing diagnostic tests and viewing images with the patient, assessment, complex management of immune checkpoint inhibitor toxicities, and planning occurred over 60 minutes.       Roni Wilson MD    cc: MD Tacho Ingram, MD Perico Jacobs    Again, thank you for allowing me to participate in the care of your patient.        Sincerely,        Roni Wilson MD    Electronically signed

## 2025-05-13 ENCOUNTER — THERAPY VISIT (OUTPATIENT)
Dept: PHYSICAL THERAPY | Facility: CLINIC | Age: OVER 89
End: 2025-05-13
Attending: INTERNAL MEDICINE
Payer: MEDICARE

## 2025-05-13 DIAGNOSIS — C66.2 CANCER OF LEFT URETER (H): ICD-10-CM

## 2025-05-13 DIAGNOSIS — C77.0 METASTASIS TO CERVICAL LYMPH NODE (H): ICD-10-CM

## 2025-05-13 PROCEDURE — 97110 THERAPEUTIC EXERCISES: CPT | Mod: GP

## 2025-05-13 PROCEDURE — 97161 PT EVAL LOW COMPLEX 20 MIN: CPT | Mod: GP

## 2025-05-13 NOTE — PROGRESS NOTES
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  Per chart review: \"Patient has mild fatigue, decrease in exercise tolerance, muscle weakness particularly in the lower extremities, imbalance, mild decrease in appetite, 40-pound weight loss in the past year, and an indwelling Barron catheter due to urinary retention. He is independent and is able to perform his activities of daily living and some chores.  Patient uses walking sticks for balance and mobility.\"  Shailesh shares that 3 weeks ago he went to the fitness center and used a cross  2x5 minutes, then 2 days later he had lumbar pain that has not gone away. He shares that his biggest concern right now is his back pain but would like to work on strength once LBP is managed. Wife shares that he is having more difficulty with walking, bed transitions, and standing- limited by LBP and weakness. Currently using B walking sticks when out of the house. Has stairs to get to the basement, stressful to do stairs, using the banisters.   Shares that R LE is shorter than L, does have a shoe lift in R shoe but continues to have a limp. Lumbar nerve compression on the L side.   Will be starting immunotherapy in the next few days. Also of note, he has a catheter.         Presenting condition or subjective complaint: preparation for immunotherapy  Date of onset: 05/08/25    Relevant medical history: Arthritis; Bladder or bowel problems; Cancer; Hearing problems; Heart problems; High blood pressure; Implanted device; Osteoarthritis; Overweight; Pacemaker; Pain at night or rest; Radiation treatment; Significant weakness, B trigger finger  Dates & types " of surgery: numerousLeft sacroiliac fusion, L2-L5 laminectomy,  L4-L5, decompression,  transforminal lumbar interbody fusion    Prior diagnostic imaging/testing results: MRI; CT scan; X-ray; Video fluoroscopic swallow study     Prior therapy history for the same diagnosis, illness or injury: Yes      Living Environment  Social support: With a significant other or spouse   Type of home: House; 2-story   Stairs to enter the home: Yes 4 Is there a railing: No     Ramp: No   Stairs inside the home: Yes 12 Is there a railing: Yes     Help at home: Home management tasks (cooking, cleaning); Home and Yard maintenance tasks  Equipment owned: HOSTING Cane     Employment: No  Was an MD-pathologist  Hobbies/Interests:      Patient goals for therapy: yes    Pain assessment: Low back pain throughout      Objective      Cognitive Status Examination  Level of Consciousness: Alert  Follows Commands and Answers Questions: 100% of the time, Follows multi step instructions  Personal Safety and Judgement: Intact  Memory: Intact    RANGE OF MOTION: tightness noted in hamstrings and DF  STRENGTH: 4/5 B LE strength, demonstrates functional weakness with gait and STS     TRANSFERS: Independent with B UE     GAIT: High steppage gait on L side, decreased gait speed without B walking sticks with SBA     BALANCE: Demonstrates balance deficits     SPECIAL TESTS  Functional Gait Assessment (FGA)    (<22/30 indicates fall risk)   10 Meter Walk Test (Comfortable)  0.55m/s   5 Times Sit-to-Stand (5TSTS)  0 without UE at standard height chair, with plinth at 28 inches, able to perform in 18s      Assessment & Plan   CLINICAL IMPRESSIONS  Medical Diagnosis: Cancer of left ureter (H) (C66.2), 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 The following significant findings have been identified:  Pain, Decreased joint mobility, Decreased strength, Impaired balance, Impaired gait, Decreased activity tolerance, and Instability. These impairments interfere with their ability to perform self care tasks, recreational activities, household chores, household mobility, and community mobility as compared to previous level of function. Testing as reported above indicate decreased safety and balance with functional mobility. This patient will benefit from skilled physical therapy services to address these concerns.      Clinical Decision Making (Complexity):  Clinical Presentation: Stable/Uncomplicated  Clinical Presentation Rationale: based on medical and personal factors listed in PT evaluation  Clinical Decision Making (Complexity): Low complexity    PLAN OF CARE  Treatment Interventions:  Interventions: Gait Training, Manual Therapy, Neuromuscular Re-education, Therapeutic Activity, Therapeutic Exercise, Self-Care/Home Management, Standardized Testing    Long Term Goals     PT Goal 1  Goal Identifier: HEP  Goal Description: Pt will be able to demonstrate HEP activities without need for cues from provider to demonstrate understanding and independence with HEP.  Rationale: to maximize safety and independence with performance of ADLs and functional tasks  Target Date: 08/05/25  PT Goal 2  Goal Identifier: Functional mobility  Goal Description: Pt will demonstrate a 4 point improvement on the FGA to demonstrate minimum clinically significant difference in score to demonstrate improved safety with functional mobility and decrease risk of falls.  Rationale: to maximize safety and independence with performance of ADLs and functional tasks  Target Date: 08/05/25  PT Goal 3  Goal Identifier: Gait speed  Goal Description: Pt demonstrate a 0.12 second improvement in gait speed to demonstrate minimum clinically significant difference in score to demonstrate improved gait velocity.  Rationale: to maximize safety and  independence with performance of ADLs and functional tasks  Target Date: 08/05/25  PT Goal 4  Goal Identifier: LE strength  Goal Description: Pt will demonstrate a 2.3 seconds improvement on 5xSTS to demonstrate minimum clinically significant difference in score to demonstrate improved LE strength.  Rationale: to maximize safety and independence with performance of ADLs and functional tasks  Target Date: 08/05/25      Frequency of Treatment: 1x/week  Duration of Treatment: 12 weeks    Recommended Referrals to Other Professionals: None at this time  Education Assessment:   Learner/Method: Patient;Demonstration;Pictures/Video;Listening    Risks and benefits of evaluation/treatment have been explained.   Patient/Family/caregiver agrees with Plan of Care.     Evaluation Time:           Signing Clinician: Nereyda Cruz PT        [facility]                                                                                   OUTPATIENT PHYSICAL THERAPY      PLAN OF TREATMENT FOR OUTPATIENT REHABILITATION   Patient's Last Name, First Name, Shailesh Decker    YOB: 1932   Provider's Name   [facility]   Medical Record No.  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              Referring Provider:  Roni Bailon  Katie    Initial Assessment  See Epic Evaluation- Start of Care Date: 05/13/25

## 2025-05-15 ENCOUNTER — TELEPHONE (OUTPATIENT)
Dept: UROLOGY | Facility: CLINIC | Age: OVER 89
End: 2025-05-15
Payer: MEDICARE

## 2025-05-15 DIAGNOSIS — R39.89 SUSPECTED UTI: Primary | ICD-10-CM

## 2025-05-15 NOTE — TELEPHONE ENCOUNTER
"Pre Op Teaching Flowsheet     Relevant Diagnosis:  Stricture or kinking of ureter   Surgical procedure:  CYSTOSCOPY, WITH RETROGRADE PYELOGRAM AND URETERAL STENT REPLACEMENT - LEFT     Date of Surgery: 6/2  Provider: Dr. Ray  Location of surgery:  U OR: [facility], [address] [place] [id number]    Post op appt: N/A    H&P: pt has scheduled  UA/UC: 2 weeks prior, pt aware  Bowel Prep: N/A    Medications: reviewed   Blood thinners: no   Diabetic medications: no   Does pt take: no                          Phentermine                          Ozempic                          Wegovy (Semaglutide)    If yes, \"Hold for 7 days before procedure.  Please consult your prescribing provider if you have questions about holding this medication.\"  Soap: Pt will  at pharmacy  Reviewed when to start clear liquids and when to start NPO: Yes  : Yes  24 hour observation: Yes    Patient demonstrates understanding of the following regarding infection Prevention:  Surgical procedure site care taught: N/A  Signs and symptoms of infection taught: Yes    Post-op follow-up:  Discussed how to contact the hospital, nurse, and clinic scheduling staff if necessary. Yes   Information Packet sent: Mail    Motivation Level: High  Asks Questions: Yes  Eager to Learn:  Yes  Cooperative:  Yes  Receptive (willing/able to accept information):   Yes    Pt or family member expressed understanding: Yes    Nereyda Rangel RN  5/15/2025  11:50 AM      "

## 2025-05-19 ENCOUNTER — ALLIED HEALTH/NURSE VISIT (OUTPATIENT)
Dept: UROLOGY | Facility: CLINIC | Age: OVER 89
End: 2025-05-19
Payer: MEDICARE

## 2025-05-19 DIAGNOSIS — R39.89 SUSPECTED UTI: ICD-10-CM

## 2025-05-19 LAB
ALBUMIN UR-MCNC: NEGATIVE MG/DL
AMORPH CRY #/AREA URNS HPF: ABNORMAL /HPF
APPEARANCE UR: ABNORMAL
BACTERIA #/AREA URNS HPF: ABNORMAL /HPF
BILIRUB UR QL STRIP: NEGATIVE
COLOR UR AUTO: ABNORMAL
GLUCOSE UR STRIP-MCNC: NEGATIVE MG/DL
HGB UR QL STRIP: ABNORMAL
HYALINE CASTS: 4 /LPF
KETONES UR STRIP-MCNC: NEGATIVE MG/DL
LEUKOCYTE ESTERASE UR QL STRIP: ABNORMAL
MUCOUS THREADS #/AREA URNS LPF: PRESENT /LPF
NITRATE UR QL: POSITIVE
PH UR STRIP: 7 [PH] (ref 5–7)
RBC URINE: 44 /HPF
SP GR UR STRIP: 1.01 (ref 1–1.03)
UROBILINOGEN UR STRIP-MCNC: NORMAL MG/DL
WBC CLUMPS #/AREA URNS HPF: PRESENT /HPF
WBC URINE: 173 /HPF

## 2025-05-19 PROCEDURE — 87086 URINE CULTURE/COLONY COUNT: CPT | Performed by: UROLOGY

## 2025-05-19 PROCEDURE — 99211 OFF/OP EST MAY X REQ PHY/QHP: CPT

## 2025-05-19 PROCEDURE — 81001 URINALYSIS AUTO W/SCOPE: CPT | Performed by: PATHOLOGY

## 2025-05-19 PROCEDURE — 99000 SPECIMEN HANDLING OFFICE-LAB: CPT | Performed by: PATHOLOGY

## 2025-05-19 NOTE — PROGRESS NOTES
Shailesh Hartman comes into clinic today at the request of Dr. Ray Ordering Provider for UA/UC cath specimen.    Urine was drawn from Barron catheter and sent to lab.     This service provided today was under the supervising provider of the peggy Ray PA-C, who was available if needed.    Nereyda Rangel RN

## 2025-05-20 ENCOUNTER — THERAPY VISIT (OUTPATIENT)
Dept: PHYSICAL THERAPY | Facility: CLINIC | Age: OVER 89
End: 2025-05-20
Attending: INTERNAL MEDICINE
Payer: MEDICARE

## 2025-05-20 DIAGNOSIS — R68.89 DECREASED FUNCTIONAL ACTIVITY TOLERANCE: ICD-10-CM

## 2025-05-20 DIAGNOSIS — C66.2 CANCER OF LEFT URETER (H): Primary | ICD-10-CM

## 2025-05-20 DIAGNOSIS — R26.89 IMPAIRED GAIT AND MOBILITY: ICD-10-CM

## 2025-05-20 DIAGNOSIS — R53.81 PHYSICAL DECONDITIONING: ICD-10-CM

## 2025-05-20 DIAGNOSIS — C77.0 METASTASIS TO CERVICAL LYMPH NODE (H): ICD-10-CM

## 2025-05-20 DIAGNOSIS — M54.50 ACUTE BILATERAL LOW BACK PAIN WITHOUT SCIATICA: ICD-10-CM

## 2025-05-20 LAB — BACTERIA UR CULT: NORMAL

## 2025-05-20 PROCEDURE — 97110 THERAPEUTIC EXERCISES: CPT | Mod: GP | Performed by: PHYSICAL THERAPIST

## 2025-05-21 ENCOUNTER — INFUSION THERAPY VISIT (OUTPATIENT)
Dept: ONCOLOGY | Facility: CLINIC | Age: OVER 89
End: 2025-05-21
Attending: INTERNAL MEDICINE
Payer: MEDICARE

## 2025-05-21 ENCOUNTER — APPOINTMENT (OUTPATIENT)
Dept: LAB | Facility: CLINIC | Age: OVER 89
End: 2025-05-21
Attending: INTERNAL MEDICINE
Payer: MEDICARE

## 2025-05-21 VITALS
WEIGHT: 199.6 LBS | BODY MASS INDEX: 30.75 KG/M2 | TEMPERATURE: 97.7 F | OXYGEN SATURATION: 97 % | RESPIRATION RATE: 18 BRPM | SYSTOLIC BLOOD PRESSURE: 163 MMHG | HEART RATE: 82 BPM | DIASTOLIC BLOOD PRESSURE: 74 MMHG

## 2025-05-21 DIAGNOSIS — Z79.899 HIGH RISK MEDICATION USE: ICD-10-CM

## 2025-05-21 DIAGNOSIS — C77.0 METASTASIS TO CERVICAL LYMPH NODE (H): ICD-10-CM

## 2025-05-21 DIAGNOSIS — C66.2 CANCER OF LEFT URETER (H): Primary | ICD-10-CM

## 2025-05-21 LAB
ALBUMIN SERPL BCG-MCNC: 4.4 G/DL (ref 3.5–5.2)
ALP SERPL-CCNC: 95 U/L (ref 40–150)
ALT SERPL W P-5'-P-CCNC: 12 U/L (ref 0–70)
AMYLASE SERPL-CCNC: 68 U/L (ref 28–100)
ANION GAP SERPL CALCULATED.3IONS-SCNC: 11 MMOL/L (ref 7–15)
AST SERPL W P-5'-P-CCNC: 16 U/L (ref 0–45)
BASOPHILS # BLD AUTO: 0.1 10E3/UL (ref 0–0.2)
BASOPHILS NFR BLD AUTO: 1 %
BILIRUB SERPL-MCNC: 0.3 MG/DL
BUN SERPL-MCNC: 32.4 MG/DL (ref 8–23)
CALCIUM SERPL-MCNC: 9.6 MG/DL (ref 8.8–10.4)
CHLORIDE SERPL-SCNC: 107 MMOL/L (ref 98–107)
CORTIS SERPL-MCNC: 8.1 UG/DL
CREAT SERPL-MCNC: 1.19 MG/DL (ref 0.67–1.17)
EGFRCR SERPLBLD CKD-EPI 2021: 57 ML/MIN/1.73M2
EOSINOPHIL # BLD AUTO: 0.2 10E3/UL (ref 0–0.7)
EOSINOPHIL NFR BLD AUTO: 3 %
ERYTHROCYTE [DISTWIDTH] IN BLOOD BY AUTOMATED COUNT: 14 % (ref 10–15)
GLUCOSE SERPL-MCNC: 90 MG/DL (ref 70–99)
HCO3 SERPL-SCNC: 23 MMOL/L (ref 22–29)
HCT VFR BLD AUTO: 34.9 % (ref 40–53)
HGB BLD-MCNC: 11 G/DL (ref 13.3–17.7)
IMM GRANULOCYTES # BLD: 0 10E3/UL
IMM GRANULOCYTES NFR BLD: 0 %
LIPASE SERPL-CCNC: 40 U/L (ref 13–60)
LYMPHOCYTES # BLD AUTO: 1 10E3/UL (ref 0.8–5.3)
LYMPHOCYTES NFR BLD AUTO: 14 %
MCH RBC QN AUTO: 30.5 PG (ref 26.5–33)
MCHC RBC AUTO-ENTMCNC: 31.5 G/DL (ref 31.5–36.5)
MCV RBC AUTO: 97 FL (ref 78–100)
MONOCYTES # BLD AUTO: 0.6 10E3/UL (ref 0–1.3)
MONOCYTES NFR BLD AUTO: 9 %
NEUTROPHILS # BLD AUTO: 4.9 10E3/UL (ref 1.6–8.3)
NEUTROPHILS NFR BLD AUTO: 73 %
NRBC # BLD AUTO: 0 10E3/UL
NRBC BLD AUTO-RTO: 0 /100
PLATELET # BLD AUTO: 189 10E3/UL (ref 150–450)
POTASSIUM SERPL-SCNC: 4.7 MMOL/L (ref 3.4–5.3)
PROT SERPL-MCNC: 6.5 G/DL (ref 6.4–8.3)
RBC # BLD AUTO: 3.61 10E6/UL (ref 4.4–5.9)
SODIUM SERPL-SCNC: 141 MMOL/L (ref 135–145)
T4 FREE SERPL-MCNC: 1.14 NG/DL (ref 0.9–1.7)
TSH SERPL DL<=0.005 MIU/L-ACNC: 1.01 UIU/ML (ref 0.3–4.2)
WBC # BLD AUTO: 6.8 10E3/UL (ref 4–11)

## 2025-05-21 PROCEDURE — 84439 ASSAY OF FREE THYROXINE: CPT

## 2025-05-21 PROCEDURE — 82533 TOTAL CORTISOL: CPT

## 2025-05-21 PROCEDURE — 85025 COMPLETE CBC W/AUTO DIFF WBC: CPT

## 2025-05-21 PROCEDURE — 250N000011 HC RX IP 250 OP 636: Mod: JZ | Performed by: INTERNAL MEDICINE

## 2025-05-21 PROCEDURE — 84443 ASSAY THYROID STIM HORMONE: CPT

## 2025-05-21 PROCEDURE — 82150 ASSAY OF AMYLASE: CPT

## 2025-05-21 PROCEDURE — 83690 ASSAY OF LIPASE: CPT

## 2025-05-21 PROCEDURE — 82310 ASSAY OF CALCIUM: CPT | Performed by: INTERNAL MEDICINE

## 2025-05-21 PROCEDURE — 36415 COLL VENOUS BLD VENIPUNCTURE: CPT

## 2025-05-21 PROCEDURE — 258N000003 HC RX IP 258 OP 636: Performed by: INTERNAL MEDICINE

## 2025-05-21 RX ADMIN — SODIUM CHLORIDE 250 ML: 0.9 INJECTION, SOLUTION INTRAVENOUS at 15:22

## 2025-05-21 RX ADMIN — SODIUM CHLORIDE 200 MG: 9 INJECTION, SOLUTION INTRAVENOUS at 15:25

## 2025-05-21 ASSESSMENT — PAIN SCALES - GENERAL: PAINLEVEL_OUTOF10: MODERATE PAIN (4)

## 2025-05-21 NOTE — PATIENT INSTRUCTIONS
Contact Numbers  [facility]: [phone] (for symptom and scheduling needs)    Please call the [facility] Triage line if you experience a temperature greater than or equal to 100.4, shaking chills, have uncontrolled nausea, vomiting and/or diarrhea, dizziness, shortness of breath, chest pain, bleeding, unexplained bruising, or if you have any other new/concerning symptoms, questions or concerns.     If you are having any concerning symptoms or wish to speak to a provider before your next infusion visit, please call your care triage to notify them so we can adequately serve you.     If you need a refill on a narcotic prescription or other medication, please call triage before your infusion appointment.           May 2025      [place] Monday Tuesday Wednesday Thursday Friday Saturday                       1     2     3       4     5     6     7    PET ONCOLOGY WHOLE BODY   8:30 AM   (60 min.)   [id number]   [facility] 8    New Patient    1:45 PM   (60 min.)   Roni Wilson MD   [facility] 9     10       11     12     13    PT Cancer Rehab Eval   8:30 AM   (45 min.)   Nereyda Cruz PT   [facility] 14     15     16    Return Patient   1:15 PM   (30 min.)   Roni Wilson MD   [facility] 17       18     19    Nurse Only   1:30 PM   (60 min.)   DAVID URO DUPLEX RN   [facility] 20    PT Cancer Rehab Treatment   9:30 AM   (45 min.)   Kain Hanson, PT   [facility] 21    Lab Peripheral   1:15 PM   (15 min.)   [facility]   [facility]    Infusion 60   2:00 PM   (60 min.)    ONC INFUSION NURSE   [facility] 22     23    New Patient   2:25 PM   (20 min.)   Megan Avalos MD   [facility] 24       25      26     27     28     29     30     31                   June 2025 Sunday Monday Tuesday Wednesday Thursday Friday Saturday   1     2    CYSTOSCOPY, WITH RETROGRADE PYELOGRAM AND URETERAL STENT REPLACEMENT  12:35 PM   Jama Ray MD   [facility] 3     4    PT Cancer Rehab Treatment   9:15 AM   (45 min.)   Kain Hanson, PT   [facility] 5     6     7       8     9     10     11    Lab Peripheral   2:00 PM   (15 min.)   [facility]   [facility]    Return Patient   2:15 PM   (45 min.)   Herminia Diaz APRN CNP   [facility]    Infusion 60   3:30 PM   (60 min.)    ONC INFUSION NURSE   [facility] 12    Lab Peripheral   2:15 PM   (15 min.)    [facility]   [facility] 13     14       15     16     17    PT Cancer Rehab Treatment  11:00 AM   (45 min.)   Kain Hanson, PT   [facility] 18     19     20     21       22     23     24    PT Cancer Rehab Treatment   1:15 PM   (45 min.)   Kain Hanson, PT   [facility] 25     26     27     28       29     30                                                Lab Results:  Recent Results (from the past 12 hours)   Comprehensive metabolic panel    Collection Time: 05/21/25  1:51 PM   Result Value Ref Range    Sodium 141 135 - 145 mmol/L    Potassium 4.7 3.4 - 5.3 mmol/L    Carbon Dioxide (CO2) 23 22 - 29 mmol/L    Anion Gap 11 7 - 15 mmol/L    Urea Nitrogen 32.4 (H) 8.0 - 23.0 mg/dL    Creatinine 1.19 (H) 0.67 - 1.17 mg/dL    GFR Estimate 57 (L) >60 mL/min/1.73m2    Calcium 9.6 8.8 - 10.4 mg/dL    Chloride 107 98 - 107 mmol/L    Glucose 90 70 - 99 mg/dL    Alkaline Phosphatase 95 40 - 150 U/L    AST 16 0 - 45 U/L    ALT 12 0 - 70 U/L    Protein Total 6.5 6.4 - 8.3 g/dL    Albumin 4.4 3.5 - 5.2 g/dL    Bilirubin Total 0.3 <=1.2 mg/dL    T4 free    Collection Time: 05/21/25  1:51 PM   Result Value Ref Range    Free T4 1.14 0.90 - 1.70 ng/dL   TSH    Collection Time: 05/21/25  1:51 PM   Result Value Ref Range    TSH 1.01 0.30 - 4.20 uIU/mL   Lipase    Collection Time: 05/21/25  1:51 PM   Result Value Ref Range    Lipase 40 13 - 60 U/L   Amylase    Collection Time: 05/21/25  1:51 PM   Result Value Ref Range    Amylase 68 28 - 100 U/L   CBC with platelets and differential    Collection Time: 05/21/25  1:51 PM   Result Value Ref Range    WBC Count 6.8 4.0 - 11.0 10e3/uL    RBC Count 3.61 (L) 4.40 - 5.90 10e6/uL    Hemoglobin 11.0 (L) 13.3 - 17.7 g/dL    Hematocrit 34.9 (L) 40.0 - 53.0 %    MCV 97 78 - 100 fL    MCH 30.5 26.5 - 33.0 pg    MCHC 31.5 31.5 - 36.5 g/dL    RDW 14.0 10.0 - 15.0 %    Platelet Count 189 150 - 450 10e3/uL    % Neutrophils 73 %    % Lymphocytes 14 %    % Monocytes 9 %    % Eosinophils 3 %    % Basophils 1 %    % Immature Granulocytes 0 %    NRBCs per 100 WBC 0 <1 /100    Absolute Neutrophils 4.9 1.6 - 8.3 10e3/uL    Absolute Lymphocytes 1.0 0.8 - 5.3 10e3/uL    Absolute Monocytes 0.6 0.0 - 1.3 10e3/uL    Absolute Eosinophils 0.2 0.0 - 0.7 10e3/uL    Absolute Basophils 0.1 0.0 - 0.2 10e3/uL    Absolute Immature Granulocytes 0.0 <=0.4 10e3/uL    Absolute NRBCs 0.0 10e3/uL

## 2025-05-21 NOTE — NURSING NOTE
Chief Complaint   Patient presents with    Blood Draw     Labs drawn via piv placed by RN in lab. VS taken.      Labs drawn from PIV placed by RN. Line flushed with saline. Vitals taken. Pt checked in for appointment(s).    Virginia Byrd RN

## 2025-05-21 NOTE — PROGRESS NOTES
Infusion Nursing Note:  Shailesh Hartman presents today for Cycle 1 Day 1 Keytruda.    Patient seen by provider today: No   present during visit today: Not Applicable.    Note:   Patient new to oncology infusion room and is receiving Keytruda for the first time. Consent noted in Dr. Wilson's note on 5/16/25. Pt oriented to infusion room and call light. New patient teaching done previously. Writer reinforced chemotherapy teaching/side effects and schedule.     Pt instructed to call care coordinator, triage (or MD on call if after hours/weekends) with chills/temp >=100.4, questions/concerns. Pt stated understanding of plan.       Patient arrives to infusion feeling well today.  He denies signs and symptoms of infection including:fever, worsening cough, shortness of breath, sore throat, diarrhea, vomiting, new rash (has eczema at baseline), or pain with urination. Patient confirms that he wants to proceed with treatment today.     Intravenous Access:  Peripheral IV placed.    Treatment Conditions:   Latest Reference Range & Units 05/21/25 13:51   Sodium 135 - 145 mmol/L 141   Potassium 3.4 - 5.3 mmol/L 4.7   Chloride 98 - 107 mmol/L 107   Carbon Dioxide (CO2) 22 - 29 mmol/L 23   Urea Nitrogen 8.0 - 23.0 mg/dL 32.4 (H)   Creatinine 0.67 - 1.17 mg/dL 1.19 (H)   GFR Estimate >60 mL/min/1.73m2 57 (L)   Calcium 8.8 - 10.4 mg/dL 9.6   Anion Gap 7 - 15 mmol/L 11   Albumin 3.5 - 5.2 g/dL 4.4   Protein Total 6.4 - 8.3 g/dL 6.5   Alkaline Phosphatase 40 - 150 U/L 95   ALT 0 - 70 U/L 12   AST 0 - 45 U/L 16   Amylase 28 - 100 U/L 68   Bilirubin Total <=1.2 mg/dL 0.3   Glucose 70 - 99 mg/dL 90   Lipase 13 - 60 U/L 40   T4 Free 0.90 - 1.70 ng/dL 1.14   TSH 0.30 - 4.20 uIU/mL 1.01   WBC 4.0 - 11.0 10e3/uL 6.8   Hemoglobin 13.3 - 17.7 g/dL 11.0 (L)   Hematocrit 40.0 - 53.0 % 34.9 (L)   Platelet Count 150 - 450 10e3/uL 189   RBC Count 4.40 - 5.90 10e6/uL 3.61 (L)   MCV 78 - 100 fL 97   MCH 26.5 - 33.0 pg 30.5   MCHC  31.5 - 36.5 g/dL 31.5   RDW 10.0 - 15.0 % 14.0   % Neutrophils % 73   % Lymphocytes % 14   % Monocytes % 9   % Eosinophils % 3   % Basophils % 1   % Immature Granulocytes % 0   NRBC/W <1 /100 0   Absolute Neutrophil 1.6 - 8.3 10e3/uL 4.9   Absolute Lymphocytes 0.8 - 5.3 10e3/uL 1.0   Absolute Monocytes 0.0 - 1.3 10e3/uL 0.6   Absolute Eosinophils 0.0 - 0.7 10e3/uL 0.2   Absolute Basophils 0.0 - 0.2 10e3/uL 0.1   Absolute Immature Granulocytes <=0.4 10e3/uL 0.0   Absolute NRBCs 10e3/uL 0.0     Results reviewed, labs MET treatment parameters, ok to proceed with treatment.  Reviewed UA/UC results from 5/19/25 with Dr. Wilson. Dr. Wilson confirms via secure chat that it is OK to proceed with Keytruda today.      Post Infusion Assessment:  Patient tolerated infusion without incident.  Blood return noted pre and post infusion.  Site patent and intact, free from redness, edema or discomfort.  No evidence of extravasations.  Access discontinued per protocol.       Discharge Plan:   Patient declined prescription refills.  Discharge instructions reviewed with: Patient.  Copy of AVS reviewed with patient and/or family.  Patient will return 6/11/25 for next appointment.  Patient discharged in stable condition accompanied by: wife.  Departure Mode: Ambulatory.      Shawanda Hu RN,

## 2025-05-23 ENCOUNTER — OFFICE VISIT (OUTPATIENT)
Dept: DERMATOLOGY | Facility: CLINIC | Age: OVER 89
End: 2025-05-23
Payer: MEDICARE

## 2025-05-23 DIAGNOSIS — D49.2 NEOPLASM OF UNSPECIFIED BEHAVIOR OF BONE, SOFT TISSUE, AND SKIN: ICD-10-CM

## 2025-05-23 DIAGNOSIS — C77.0 METASTASIS TO CERVICAL LYMPH NODE (H): ICD-10-CM

## 2025-05-23 DIAGNOSIS — C66.2 CANCER OF LEFT URETER (H): ICD-10-CM

## 2025-05-23 DIAGNOSIS — L30.9 ECZEMA, UNSPECIFIED TYPE: Primary | ICD-10-CM

## 2025-05-23 PROCEDURE — 88305 TISSUE EXAM BY PATHOLOGIST: CPT | Mod: TC | Performed by: STUDENT IN AN ORGANIZED HEALTH CARE EDUCATION/TRAINING PROGRAM

## 2025-05-23 ASSESSMENT — PAIN SCALES - GENERAL: PAINLEVEL_OUTOF10: NO PAIN (0)

## 2025-05-23 NOTE — NURSING NOTE
Dermatology Rooming Note    Shailesh Hartman's goals for this visit include:   Chief Complaint   Patient presents with    Derm Problem     Eczema recheck; started infusion therapy 2 days ago     Richard DELEON CMA - Dermatology

## 2025-05-23 NOTE — NURSING NOTE
Lidocaine-epinephrine 1-1:338159 % injection   1.5 mL once for one use, starting 5/23/2025 ending 5/23/2025,  2mL disp, R-0, injection  Injected by Richard DELEON CMA - Dermatology

## 2025-05-23 NOTE — PATIENT INSTRUCTIONS
Wound Care After a Biopsy    What is a skin biopsy?  A skin biopsy allows the doctor to examine a very small piece of tissue under the microscope to determine the diagnosis and the best treatment for the skin condition. A local anesthetic (numbing medicine) is injected with a very small needle into the skin area to be tested. A small piece of skin is taken from the area. Sometimes a suture (stitch) is used.     What are the risks of a skin biopsy?  I will experience scar, bleeding, swelling, pain, crusting and redness. I may experience incomplete removal or recurrence. Risks of this procedure are excessive bleeding, bruising, infection, nerve damage, numbness, thick (hypertrophic or keloidal) scar and non-diagnostic biopsy.    How should I care for my wound for the first 24 hours?  Keep the wound dry and covered for 24 hours  If it bleeds, hold direct pressure on the area for 15 minutes. If bleeding does not stop, call us or go to the emergency room  Avoid strenuous exercise the first 1-2 days or as your doctor instructs you    How should I care for the wound after 24 hours?  After 24 hours, remove the bandage  You may bathe or shower as normal  If you had a scalp biopsy, you can shampoo as usual and can use shower water to clean the biopsy site daily  Clean the wound once a day with gentle soap and water  Do not scrub, be gentle  Apply white petroleum/Vaseline after cleaning the wound with a cotton swab or a clean finger, and keep the site covered with a Bandaid /bandage. Bandages are not necessary with a scalp biopsy  If you are unable to cover the site with a Bandaid /bandage, re-apply ointment 2-3 times a day to keep the site moist. Moisture will help with healing  Avoid strenuous activity for first 1-2 days  Avoid lakes, rivers, pools, and oceans until the stitches are removed or the site is healed    How do I clean my wound?  Wash hands thoroughly with soap or use hand  before all wound care  Clean  the wound with gentle soap and water  Apply white petroleum/Vaseline  to wound after it is clean  Replace the Bandaid /bandage to keep the wound covered for the first few days or as instructed by your doctor  If you had a scalp biopsy, warm shower water to the area on a daily basis should suffice    What should I use to clean my wound?   Cotton-tipped applicators (Qtips )  White petroleum jelly (Vaseline ). Use a clean new container and use Q-tips to apply.  Bandaids  as needed  Gentle soap     How should I care for my wound long term?  Do not get your wound dirty  Keep up with wound care for one week or until the area is healed.  If you have stitches, stitches need to be removed in 14 days. You may return to our clinic for this or you may have it done locally at your doctor s office.  A small scab will form and fall off by itself when the area is completely healed. The area will be red and will become pink in color as it heals. Sun protection is very important for how your scar will turn out. Sunscreen with an SPF 30 or greater is recommended once the area is healed.  You should have some soreness but it should be mild and slowly go away over several days. Talk to your doctor about using tylenol for pain,    When should I call my doctor?  If you have increased:   Pain or swelling  Pus or drainage (clear or slightly yellow drainage is ok)  Temperature over 100F  Spreading redness or warmth around wound    When will I hear about my results?  The biopsy results can take 2 weeks to come back.  Your results will automatically release to [email or web address] before your provider has even reviewed them.  The clinic will call you with the results, send you a [email or web address] message, or have you schedule a follow-up clinic or phone time to discuss the results.  Contact our clinics if you do not hear from us in 2 weeks.    Who should I call with questions?  [facility]: [phone]  [facility]  Critical access hospital: [phone]  For urgent needs outside of business hours call the [facility] at [phone] and ask for the dermatology resident on call

## 2025-05-23 NOTE — PROGRESS NOTES
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  On Keytruda as patient is chemotherapy ineligible, wanted to avoid morbidity secondary peripheral neuropathy associated with enfortumab vedotin.  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  We discussed that this medication is generally extremely well-tolerated and that there are only a handful of concerning skin side effects, and that these are quite rare.  The most common dermatologic side effects of Keytruda are mild rashes and itching.  Very rarely serious dangerous cutaneous adverse reactions including TEN/SJS, DRESS, or drug-induced bullous pemphigoid can occur--we discussed that if the patient develops any unusual rashes, blisters, skin pain, severe itching, involvement of mucosal surfaces, he should contact me and his oncologist immediately.  Again I also reinforced that these are very rare side effects, and that the vast majority of people tolerate immunotherapy very well.  - Continue current regimen of Eucerin and colloidal oatmeal  -Patient advised to call oncology and dermatology immediately if he develops blisters, skin pain, severe itching, mucosal involvement, although these side effects are very rare  - Topical steroids can be sent if needed.    - If topical steroids are needed for mild dermatitis associated with treatment, recommend:  -Hydrocortisone 2.5% cream BID PRN itch/rash to the face,  axilla, groin, 454 g jar  -Triamcinolone 0.1% cream BID PRN itch/rash to the trunk and extremities 454 g jar  -Clobetasol 0.05% cream BID PRN itch/rash to the hands and feet; recommend under occlusion with white cotton gloves and socks at night, 454 g jar    The longitudinal plan of care for the diagnosis(es)/condition(s) as documented were addressed during this visit. Due to the added complexity in care, I will continue to support Misael in the subsequent management and with ongoing continuity of care.    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  He is doing an excellent job with over-the-counter moisturizers and colloidal oatmeal.  Recommend continuing with the current regimen as it is excellent.  If needed we can consider a topical steroid, but for now it seems unnecessary.  The patient would also like to avoid topical steroids if possible.  His wife takes ordinarily diligent care of him, and is very attentive to any changes in his skin.    # Neoplasm of unspecified behavior, left upper arm  Indurated plaque approx 5 mm, present several years, pt with history of urothelial ca, NMSC vs. Metastasis.  The patient reports the spot has been present for several years and does not resolve with treatment with risers, topical steroids.  It is slightly indurated, no overlying epidermal changes.    - Punch biopsy procedure note, location(s): left upper arm. After discussion of benefits and risks including but not limited to bleeding, infection, scar, incomplete removal, recurrence, and non-diagnostic biopsy, written consent and photographs were obtained. The area was cleaned with isopropyl alcohol. 0.5mL of 1% lidocaine with epinephrine was injected to obtain adequate anesthesia and a 4 mm punch biopsy was performed at site(s). 4-0 Prolene sutures were utilized to approximate the epidermal edges. White petrolatum ointment and a bandage was applied to the wound. Explicit verbal and  written wound care instructions were provided. The patient left the dermatology clinic in good condition.     Follow-Up:   Return to clinic  as needed    History of Present Illness     Reason for Referral: Stage IV urothelial carcinoma on immunotherapy with underlying eczema    CC: Derm Problem (Eczema recheck; started infusion therapy 2 days ago)      Shailesh Hartman is a 89 year old male referred by  Roni Wilson MD for eczematous dermatitis.  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  He is currently tolerating this well.  He has baseline xerosis/mild eczematous dermatitis for which he is using over-the-counter Eucerin and colloidal oatmeal.  His wife takes excellent care of him.  The 2 of them would like to discuss possible skin side effects they should expect or be concerned about on Keytruda.  They have no specific concerns today aside from making sure that their current regimen is adequate.  He would like to avoid topical steroids if possible.    Review of systems is otherwise negative except as noted above.    History, Allergies, and Medications:         Past Medical History:   Diagnosis Date    Pacemaker        Allergies   Allergen Reactions    Seasonal Allergies Other (See Comments)     Running nose    Molds & Smuts Other (See Comments)     Sneezing, coongestion    mold extract    Soap Other (See Comments)     Skin irritation    Shaving Soap and most deodarants cause skin irritation.    Wheat Other (See Comments) and Unknown     Runny nose  Runny nose         Current Outpatient Medications   Medication Sig Dispense Refill    acetaminophen (TYLENOL) 500 MG tablet Take 500-1,000 mg by mouth 3 times daily.      allopurinol (ZYLOPRIM) 300 MG tablet Take 300 mg by mouth daily      benzoyl peroxide 5 % external liquid Use in the shower once per day (Patient not taking: Reported on 5/8/2025) 236 mL 11    bisacodyl (DULCOLAX) 5  MG EC tablet Take 2 tablets at 3 pm the day before your procedure. If your procedure is before 11 am, take 2 additional tablets at 11 pm. If your procedure is after 11 am, take 2 additional tablets at 6 am. For additional instructions refer to your colonoscopy prep instructions. (Patient not taking: Reported on 5/8/2025) 4 tablet 0    ciprofloxacin (CIPRO) 250 MG tablet Take 250 mg by mouth 2 times daily. (Patient not taking: Reported on 5/8/2025)      clindamycin (CLEOCIN T) 1 % external lotion Apply topically 2 times daily. Apply to pink bumps until itch has resolved (Patient not taking: Reported on 5/8/2025) 60 mL 3    diphenhydrAMINE-acetaminophen (TYLENOL PM)  MG tablet Take 1 tablet by mouth nightly as needed for sleep. (Patient not taking: Reported on 5/8/2025)      hydrochlorothiazide (MICROZIDE) 12.5 MG capsule Take 12.5 mg by mouth daily. (Patient not taking: Reported on 5/8/2025)      ketoconazole (NIZORAL) 2 % external shampoo Apply to the scalp for 5 minutes in the shower three times a week (Patient not taking: Reported on 5/8/2025) 120 mL 3    lisinopril (PRINIVIL,ZESTRIL) 20 MG tablet Take 20 mg by mouth 2 times daily      loperamide (IMODIUM A-D) 2 MG tablet Take 1 tablet (2 mg) by mouth 4 times daily as needed for diarrhea. 60 tablet 5    loperamide (IMODIUM) 2 MG capsule Take 2 mg by mouth 4 times daily as needed for diarrhea.      melatonin 3 MG tablet Take 1.5 mg by mouth nightly as needed for sleep.      Multiple Vitamin (MULTI-VITAMINS) TABS 1 tab by mouth daily      ondansetron (ZOFRAN) 8 MG tablet Take 1 tablet (8 mg) by mouth every 8 hours as needed for nausea. 60 tablet 5    oxyCODONE (ROXICODONE) 5 MG tablet Take 1-2 tablets (5-10 mg) by mouth every 4 hours as needed for moderate to severe pain. 6 tablet 0    phenazopyridine (PYRIDIUM) 200 MG tablet Take 1 tablet (200 mg) by mouth 3 times daily as needed for irritation. Take for bladder pain (Patient not taking: Reported on 5/8/2025)  9 tablet 0    potassium chloride ER (K-TAB/KLOR-CON) 10 MEQ CR tablet TAKES 10 MEQ BID  1    senna-docusate (SENOKOT-S/PERICOLACE) 8.6-50 MG tablet Take 1-2 tablets by mouth 2 times daily. (Patient not taking: Reported on 5/8/2025) 30 tablet 0    simvastatin (ZOCOR) 20 MG tablet Take 1 tablet by mouth At Bedtime      tamsulosin (FLOMAX) 0.4 MG capsule Take 1 capsule (0.4 mg) by mouth daily as needed. Take for stent discomfort or for kidney stone passage (Patient not taking: Reported on 5/8/2025) 30 capsule 0    tolterodine (DETROL) 2 MG tablet Take 1 tablet (2 mg) by mouth every 12 hours as needed for incontinence (Spasms). (Patient not taking: Reported on 5/8/2025) 30 tablet 0    triamcinolone (KENALOG) 0.1 % external ointment Apply topically 2 times daily. Apply to the right lower leg and all other itchy pink spots on the body (Patient not taking: Reported on 5/8/2025) 80 g 3    triamterene-HCTZ (DYAZIDE) 37.5-25 MG capsule TK 1 C PO D  3         Physical Exam:     Vitals: There were no vitals taken for this visit.  SKIN: Focused examination of face, arms, leg, trunk, ankles was performed.  See pertinent findings in A/P.     Laboratory Data:   Cell counts  Recent Labs   Lab Test 05/21/25  1351   HGB 11.0*   HCT 34.9*   WBC 6.8          Basic Metabolic Panel  Recent Labs   Lab Test 05/21/25  1351 05/07/25  0850     --    POTASSIUM 4.7  --    CHLORIDE 107  --    CO2 23  --    BUN 32.4*  --    CR 1.19*  --    GLC 90 99        LFTs  Recent Labs   Lab Test 05/21/25  1351   BILITOTAL 0.3   ALKPHOS 95   AST 16   ALT 12   ALBUMIN 4.4     Megan Avalos MD  Adjunct  of Dermatology  Cell: [phone]

## 2025-05-23 NOTE — LETTER
5/23/2025       RE: Shailesh Hart[place]  [id number] [address] [address]     Dear Colleague,    Thank you for referring your patient, Shailesh Hartman, to the [facility] [place] at [facility]. Please see a copy of my visit note below.         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  On Keytruda as patient is chemotherapy ineligible, wanted to avoid morbidity secondary peripheral neuropathy associated with enfortumab vedotin.  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  We discussed that this medication is generally extremely well-tolerated and that there are only a handful of concerning skin side effects, and that these are quite rare.  The most common dermatologic side effects of Keytruda are mild rashes and itching.  Very rarely serious dangerous cutaneous adverse reactions including TEN/SJS, DRESS, or drug-induced bullous pemphigoid can occur--we discussed that if the patient develops any unusual rashes, blisters, skin pain, severe itching, involvement of mucosal surfaces, he should contact me and his oncologist immediately.  Again I also reinforced that these are very rare side effects, and that the vast majority of people tolerate immunotherapy very well.  - Continue current regimen of Eucerin and colloidal oatmeal  -Patient advised to call oncology and  dermatology immediately if he develops blisters, skin pain, severe itching, mucosal involvement, although these side effects are very rare  - Topical steroids can be sent if needed.    - If topical steroids are needed for mild dermatitis associated with treatment, recommend:  -Hydrocortisone 2.5% cream BID PRN itch/rash to the face, axilla, groin, 454 g jar  -Triamcinolone 0.1% cream BID PRN itch/rash to the trunk and extremities 454 g jar  -Clobetasol 0.05% cream BID PRN itch/rash to the hands and feet; recommend under occlusion with white cotton gloves and socks at night, 454 g jar    The longitudinal plan of care for the diagnosis(es)/condition(s) as documented were addressed during this visit. Due to the added complexity in care, I will continue to support Misael in the subsequent management and with ongoing continuity of care.    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  He is doing an excellent job with over-the-counter moisturizers and colloidal oatmeal.  Recommend continuing with the current regimen as it is excellent.  If needed we can consider a topical steroid, but for now it seems unnecessary.  The patient would also like to avoid topical steroids if possible.  His wife takes ordinarily diligent care of him, and is very attentive to any changes in his skin.    # Neoplasm of unspecified behavior, left upper arm  Indurated plaque approx 5 mm, present several years, pt with history of urothelial ca, NMSC vs. Metastasis.  The patient reports the spot has been present for several years and does not resolve with treatment with risers, topical steroids.  It is slightly indurated, no overlying epidermal changes.    - Punch biopsy procedure note, location(s): left upper arm. After discussion of benefits and risks including but not limited to bleeding, infection, scar, incomplete removal, recurrence, and non-diagnostic biopsy, written consent and photographs were obtained.  The area was cleaned with isopropyl alcohol. 0.5mL of 1% lidocaine with epinephrine was injected to obtain adequate anesthesia and a 4 mm punch biopsy was performed at site(s). 4-0 Prolene sutures were utilized to approximate the epidermal edges. White petrolatum ointment and a bandage was applied to the wound. Explicit verbal and written wound care instructions were provided. The patient left the dermatology clinic in good condition.     Follow-Up:   Return to clinic  as needed    History of Present Illness     Reason for Referral: Stage IV urothelial carcinoma on immunotherapy with underlying eczema    CC: Derm Problem (Eczema recheck; started infusion therapy 2 days ago)      Shailesh Hartman is a 89 year old male referred by  Roni Wilson MD for eczematous dermatitis.  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  He is currently tolerating this well.  He has baseline xerosis/mild eczematous dermatitis for which he is using over-the-counter Eucerin and colloidal oatmeal.  His wife takes excellent care of him.  The 2 of them would like to discuss possible skin side effects they should expect or be concerned about on Keytruda.  They have no specific concerns today aside from making sure that their current regimen is adequate.  He would like to avoid topical steroids if possible.    Review of systems is otherwise negative except as noted above.    History, Allergies, and Medications:         Past Medical History:   Diagnosis Date     Pacemaker        Allergies   Allergen Reactions     Seasonal Allergies Other (See Comments)     Running nose     Molds & Smuts Other (See Comments)     Sneezing, coongestion    mold extract     Soap Other (See Comments)     Skin irritation    Shaving Soap and most deodarants cause skin irritation.     Wheat Other (See Comments) and Unknown     Runny nose  Runny nose         Current Outpatient Medications    Medication Sig Dispense Refill     acetaminophen (TYLENOL) 500 MG tablet Take 500-1,000 mg by mouth 3 times daily.       allopurinol (ZYLOPRIM) 300 MG tablet Take 300 mg by mouth daily       benzoyl peroxide 5 % external liquid Use in the shower once per day (Patient not taking: Reported on 5/8/2025) 236 mL 11     bisacodyl (DULCOLAX) 5 MG EC tablet Take 2 tablets at 3 pm the day before your procedure. If your procedure is before 11 am, take 2 additional tablets at 11 pm. If your procedure is after 11 am, take 2 additional tablets at 6 am. For additional instructions refer to your colonoscopy prep instructions. (Patient not taking: Reported on 5/8/2025) 4 tablet 0     ciprofloxacin (CIPRO) 250 MG tablet Take 250 mg by mouth 2 times daily. (Patient not taking: Reported on 5/8/2025)       clindamycin (CLEOCIN T) 1 % external lotion Apply topically 2 times daily. Apply to pink bumps until itch has resolved (Patient not taking: Reported on 5/8/2025) 60 mL 3     diphenhydrAMINE-acetaminophen (TYLENOL PM)  MG tablet Take 1 tablet by mouth nightly as needed for sleep. (Patient not taking: Reported on 5/8/2025)       hydrochlorothiazide (MICROZIDE) 12.5 MG capsule Take 12.5 mg by mouth daily. (Patient not taking: Reported on 5/8/2025)       ketoconazole (NIZORAL) 2 % external shampoo Apply to the scalp for 5 minutes in the shower three times a week (Patient not taking: Reported on 5/8/2025) 120 mL 3     lisinopril (PRINIVIL,ZESTRIL) 20 MG tablet Take 20 mg by mouth 2 times daily       loperamide (IMODIUM A-D) 2 MG tablet Take 1 tablet (2 mg) by mouth 4 times daily as needed for diarrhea. 60 tablet 5     loperamide (IMODIUM) 2 MG capsule Take 2 mg by mouth 4 times daily as needed for diarrhea.       melatonin 3 MG tablet Take 1.5 mg by mouth nightly as needed for sleep.       Multiple Vitamin (MULTI-VITAMINS) TABS 1 tab by mouth daily       ondansetron (ZOFRAN) 8 MG tablet Take 1 tablet (8 mg) by mouth every 8 hours  as needed for nausea. 60 tablet 5     oxyCODONE (ROXICODONE) 5 MG tablet Take 1-2 tablets (5-10 mg) by mouth every 4 hours as needed for moderate to severe pain. 6 tablet 0     phenazopyridine (PYRIDIUM) 200 MG tablet Take 1 tablet (200 mg) by mouth 3 times daily as needed for irritation. Take for bladder pain (Patient not taking: Reported on 5/8/2025) 9 tablet 0     potassium chloride ER (K-TAB/KLOR-CON) 10 MEQ CR tablet TAKES 10 MEQ BID  1     senna-docusate (SENOKOT-S/PERICOLACE) 8.6-50 MG tablet Take 1-2 tablets by mouth 2 times daily. (Patient not taking: Reported on 5/8/2025) 30 tablet 0     simvastatin (ZOCOR) 20 MG tablet Take 1 tablet by mouth At Bedtime       tamsulosin (FLOMAX) 0.4 MG capsule Take 1 capsule (0.4 mg) by mouth daily as needed. Take for stent discomfort or for kidney stone passage (Patient not taking: Reported on 5/8/2025) 30 capsule 0     tolterodine (DETROL) 2 MG tablet Take 1 tablet (2 mg) by mouth every 12 hours as needed for incontinence (Spasms). (Patient not taking: Reported on 5/8/2025) 30 tablet 0     triamcinolone (KENALOG) 0.1 % external ointment Apply topically 2 times daily. Apply to the right lower leg and all other itchy pink spots on the body (Patient not taking: Reported on 5/8/2025) 80 g 3     triamterene-HCTZ (DYAZIDE) 37.5-25 MG capsule TK 1 C PO D  3         Physical Exam:     Vitals: There were no vitals taken for this visit.  SKIN: Focused examination of face, arms, leg, trunk, ankles was performed.  See pertinent findings in A/P.     Laboratory Data:   Cell counts  Recent Labs   Lab Test 05/21/25  1351   HGB 11.0*   HCT 34.9*   WBC 6.8          Basic Metabolic Panel  Recent Labs   Lab Test 05/21/25  1351 05/07/25  0850     --    POTASSIUM 4.7  --    CHLORIDE 107  --    CO2 23  --    BUN 32.4*  --    CR 1.19*  --    GLC 90 99        LFTs  Recent Labs   Lab Test 05/21/25  1351   BILITOTAL 0.3   ALKPHOS 95   AST 16   ALT 12   ALBUMIN 4.4     Megan  MD Bailey  Adjunct  of Dermatology  Cell: [phone]    Again, thank you for allowing me to participate in the care of your patient.      Sincerely,    Megan Avalos MD

## 2025-05-28 ENCOUNTER — RESULTS FOLLOW-UP (OUTPATIENT)
Dept: DERMATOLOGY | Facility: CLINIC | Age: OVER 89
End: 2025-05-28

## 2025-05-28 LAB
PATH REPORT.COMMENTS IMP SPEC: NORMAL
PATH REPORT.FINAL DX SPEC: NORMAL
PATH REPORT.GROSS SPEC: NORMAL
PATH REPORT.MICROSCOPIC SPEC OTHER STN: NORMAL
PATH REPORT.RELEVANT HX SPEC: NORMAL

## 2025-05-28 NOTE — RESULT ENCOUNTER NOTE
Final Diagnosis   A(1). Skin, Left upper arm, punch:  - Papillomatous epidermal hyperplasia and spongiosis - (see comment)        Please let the patient know the lesion is benign and no additional treatment is needed, thanks!

## 2025-06-02 ENCOUNTER — ANESTHESIA (OUTPATIENT)
Dept: SURGERY | Facility: CLINIC | Age: OVER 89
End: 2025-06-02
Payer: MEDICARE

## 2025-06-02 ENCOUNTER — PREP FOR PROCEDURE (OUTPATIENT)
Dept: SURGERY | Facility: CLINIC | Age: OVER 89
End: 2025-06-02

## 2025-06-02 ENCOUNTER — HOSPITAL ENCOUNTER (OUTPATIENT)
Facility: CLINIC | Age: OVER 89
Discharge: HOME | End: 2025-06-02
Attending: UROLOGY | Admitting: UROLOGY
Payer: MEDICARE

## 2025-06-02 ENCOUNTER — ANESTHESIA EVENT (OUTPATIENT)
Dept: SURGERY | Facility: CLINIC | Age: OVER 89
End: 2025-06-02
Payer: MEDICARE

## 2025-06-02 ENCOUNTER — APPOINTMENT (OUTPATIENT)
Dept: GENERAL RADIOLOGY | Facility: CLINIC | Age: OVER 89
End: 2025-06-02
Attending: UROLOGY
Payer: MEDICARE

## 2025-06-02 ENCOUNTER — ANCILLARY PROCEDURE (OUTPATIENT)
Dept: CARDIOLOGY | Facility: CLINIC | Age: OVER 89
End: 2025-06-02
Attending: INTERNAL MEDICINE
Payer: MEDICARE

## 2025-06-02 VITALS
HEART RATE: 59 BPM | SYSTOLIC BLOOD PRESSURE: 141 MMHG | RESPIRATION RATE: 18 BRPM | BODY MASS INDEX: 29.5 KG/M2 | TEMPERATURE: 97.7 F | DIASTOLIC BLOOD PRESSURE: 59 MMHG | HEIGHT: 68 IN | OXYGEN SATURATION: 95 % | WEIGHT: 194.67 LBS

## 2025-06-02 DIAGNOSIS — N13.5 URETER, STRICTURE: Primary | ICD-10-CM

## 2025-06-02 DIAGNOSIS — Z45.02 FITTING AND ADJUSTMENT OF AUTOMATIC IMPLANTABLE CARDIOVERTER-DEFIBRILLATOR: Primary | ICD-10-CM

## 2025-06-02 DIAGNOSIS — Z45.02 FITTING AND ADJUSTMENT OF AUTOMATIC IMPLANTABLE CARDIOVERTER-DEFIBRILLATOR: ICD-10-CM

## 2025-06-02 LAB
ANION GAP SERPL CALCULATED.3IONS-SCNC: 12 MMOL/L (ref 7–15)
BUN SERPL-MCNC: 38.2 MG/DL (ref 8–23)
CALCIUM SERPL-MCNC: 9.3 MG/DL (ref 8.8–10.4)
CHLORIDE SERPL-SCNC: 107 MMOL/L (ref 98–107)
CREAT SERPL-MCNC: 1.27 MG/DL (ref 0.67–1.17)
EGFRCR SERPLBLD CKD-EPI 2021: 53 ML/MIN/1.73M2
GLUCOSE SERPL-MCNC: 94 MG/DL (ref 70–99)
HCO3 SERPL-SCNC: 22 MMOL/L (ref 22–29)
POTASSIUM SERPL-SCNC: 4.3 MMOL/L (ref 3.4–5.3)
SODIUM SERPL-SCNC: 141 MMOL/L (ref 135–145)

## 2025-06-02 PROCEDURE — 250N000011 HC RX IP 250 OP 636: Performed by: STUDENT IN AN ORGANIZED HEALTH CARE EDUCATION/TRAINING PROGRAM

## 2025-06-02 PROCEDURE — 999N000179 XR SURGERY CARM FLUORO LESS THAN 5 MIN W STILLS

## 2025-06-02 PROCEDURE — 360N000082 HC SURGERY LEVEL 2 W/ FLUORO, PER MIN: Performed by: UROLOGY

## 2025-06-02 PROCEDURE — C1769 GUIDE WIRE: HCPCS | Performed by: UROLOGY

## 2025-06-02 PROCEDURE — 82435 ASSAY OF BLOOD CHLORIDE: CPT | Performed by: UROLOGY

## 2025-06-02 PROCEDURE — 710N000012 HC RECOVERY PHASE 2, PER MINUTE: Performed by: UROLOGY

## 2025-06-02 PROCEDURE — 52332 CYSTOSCOPY AND TREATMENT: CPT | Mod: LT | Performed by: UROLOGY

## 2025-06-02 PROCEDURE — C1758 CATHETER, URETERAL: HCPCS | Performed by: UROLOGY

## 2025-06-02 PROCEDURE — 250N000009 HC RX 250: Performed by: STUDENT IN AN ORGANIZED HEALTH CARE EDUCATION/TRAINING PROGRAM

## 2025-06-02 PROCEDURE — 258N000003 HC RX IP 258 OP 636: Performed by: STUDENT IN AN ORGANIZED HEALTH CARE EDUCATION/TRAINING PROGRAM

## 2025-06-02 PROCEDURE — 250N000011 HC RX IP 250 OP 636: Performed by: UROLOGY

## 2025-06-02 PROCEDURE — 255N000002 HC RX 255 OP 636: Performed by: UROLOGY

## 2025-06-02 PROCEDURE — 250N000013 HC RX MED GY IP 250 OP 250 PS 637: Performed by: STUDENT IN AN ORGANIZED HEALTH CARE EDUCATION/TRAINING PROGRAM

## 2025-06-02 PROCEDURE — 93280 PM DEVICE PROGR EVAL DUAL: CPT

## 2025-06-02 PROCEDURE — 370N000017 HC ANESTHESIA TECHNICAL FEE, PER MIN: Performed by: UROLOGY

## 2025-06-02 PROCEDURE — 84132 ASSAY OF SERUM POTASSIUM: CPT | Performed by: UROLOGY

## 2025-06-02 PROCEDURE — 74420 UROGRAPHY RTRGR +-KUB: CPT | Mod: 26 | Performed by: UROLOGY

## 2025-06-02 PROCEDURE — 999N000141 HC STATISTIC PRE-PROCEDURE NURSING ASSESSMENT: Performed by: UROLOGY

## 2025-06-02 PROCEDURE — 272N000001 HC OR GENERAL SUPPLY STERILE: Performed by: UROLOGY

## 2025-06-02 PROCEDURE — 36415 COLL VENOUS BLD VENIPUNCTURE: CPT | Performed by: UROLOGY

## 2025-06-02 PROCEDURE — C2625 STENT, NON-COR, TEM W/DEL SY: HCPCS | Performed by: UROLOGY

## 2025-06-02 PROCEDURE — 250N000013 HC RX MED GY IP 250 OP 250 PS 637: Performed by: UROLOGY

## 2025-06-02 DEVICE — AMPLATZ URETERAL STENT SET ULTRATHANE
Type: IMPLANTABLE DEVICE | Site: URETER | Status: FUNCTIONAL
Brand: AMPLATZ

## 2025-06-02 RX ORDER — OXYCODONE HYDROCHLORIDE 5 MG/1
5 TABLET ORAL EVERY 6 HOURS PRN
Qty: 4 TABLET | Refills: 0 | Status: SHIPPED | OUTPATIENT
Start: 2025-06-02 | End: 2025-06-05

## 2025-06-02 RX ORDER — ACETAMINOPHEN 325 MG/1
975 TABLET ORAL ONCE
Status: COMPLETED | OUTPATIENT
Start: 2025-06-02 | End: 2025-06-02

## 2025-06-02 RX ORDER — OXYCODONE HYDROCHLORIDE 5 MG/1
5 TABLET ORAL
Status: COMPLETED | OUTPATIENT
Start: 2025-06-02 | End: 2025-06-02

## 2025-06-02 RX ORDER — ONDANSETRON 2 MG/ML
4 INJECTION INTRAMUSCULAR; INTRAVENOUS EVERY 30 MIN PRN
Status: DISCONTINUED | OUTPATIENT
Start: 2025-06-02 | End: 2025-06-02 | Stop reason: HOSPADM

## 2025-06-02 RX ORDER — SODIUM CHLORIDE, SODIUM LACTATE, POTASSIUM CHLORIDE, CALCIUM CHLORIDE 600; 310; 30; 20 MG/100ML; MG/100ML; MG/100ML; MG/100ML
INJECTION, SOLUTION INTRAVENOUS CONTINUOUS
Status: DISCONTINUED | OUTPATIENT
Start: 2025-06-02 | End: 2025-06-02 | Stop reason: HOSPADM

## 2025-06-02 RX ORDER — FENTANYL CITRATE 50 UG/ML
INJECTION, SOLUTION INTRAMUSCULAR; INTRAVENOUS PRN
Status: DISCONTINUED | OUTPATIENT
Start: 2025-06-02 | End: 2025-06-02

## 2025-06-02 RX ORDER — ACETAMINOPHEN 325 MG/1
975 TABLET ORAL ONCE
OUTPATIENT
Start: 2025-06-02 | End: 2025-06-02

## 2025-06-02 RX ORDER — HYDROMORPHONE HCL IN WATER/PF 6 MG/30 ML
0.2 PATIENT CONTROLLED ANALGESIA SYRINGE INTRAVENOUS EVERY 5 MIN PRN
Status: DISCONTINUED | OUTPATIENT
Start: 2025-06-02 | End: 2025-06-02 | Stop reason: HOSPADM

## 2025-06-02 RX ORDER — PROPOFOL 10 MG/ML
INJECTION, EMULSION INTRAVENOUS CONTINUOUS PRN
Status: DISCONTINUED | OUTPATIENT
Start: 2025-06-02 | End: 2025-06-02

## 2025-06-02 RX ORDER — ONDANSETRON 4 MG/1
4 TABLET, ORALLY DISINTEGRATING ORAL EVERY 30 MIN PRN
Status: DISCONTINUED | OUTPATIENT
Start: 2025-06-02 | End: 2025-06-02 | Stop reason: HOSPADM

## 2025-06-02 RX ORDER — FENTANYL CITRATE 50 UG/ML
50 INJECTION, SOLUTION INTRAMUSCULAR; INTRAVENOUS EVERY 5 MIN PRN
Status: DISCONTINUED | OUTPATIENT
Start: 2025-06-02 | End: 2025-06-02 | Stop reason: HOSPADM

## 2025-06-02 RX ORDER — ACETAMINOPHEN 650 MG/1
650 SUPPOSITORY RECTAL ONCE
Status: COMPLETED | OUTPATIENT
Start: 2025-06-02 | End: 2025-06-02

## 2025-06-02 RX ORDER — FENTANYL CITRATE 50 UG/ML
25 INJECTION, SOLUTION INTRAMUSCULAR; INTRAVENOUS EVERY 5 MIN PRN
Status: DISCONTINUED | OUTPATIENT
Start: 2025-06-02 | End: 2025-06-02 | Stop reason: HOSPADM

## 2025-06-02 RX ORDER — NALOXONE HYDROCHLORIDE 0.4 MG/ML
0.1 INJECTION, SOLUTION INTRAMUSCULAR; INTRAVENOUS; SUBCUTANEOUS
Status: DISCONTINUED | OUTPATIENT
Start: 2025-06-02 | End: 2025-06-02 | Stop reason: HOSPADM

## 2025-06-02 RX ORDER — PROPOFOL 10 MG/ML
INJECTION, EMULSION INTRAVENOUS PRN
Status: DISCONTINUED | OUTPATIENT
Start: 2025-06-02 | End: 2025-06-02

## 2025-06-02 RX ORDER — OXYCODONE HYDROCHLORIDE 10 MG/1
10 TABLET ORAL
Status: DISCONTINUED | OUTPATIENT
Start: 2025-06-02 | End: 2025-06-02 | Stop reason: HOSPADM

## 2025-06-02 RX ORDER — HYDROMORPHONE HCL IN WATER/PF 6 MG/30 ML
0.4 PATIENT CONTROLLED ANALGESIA SYRINGE INTRAVENOUS EVERY 5 MIN PRN
Status: DISCONTINUED | OUTPATIENT
Start: 2025-06-02 | End: 2025-06-02 | Stop reason: HOSPADM

## 2025-06-02 RX ORDER — CEFAZOLIN SODIUM/WATER 2 G/20 ML
2 SYRINGE (ML) INTRAVENOUS SEE ADMIN INSTRUCTIONS
Status: DISCONTINUED | OUTPATIENT
Start: 2025-06-02 | End: 2025-06-02 | Stop reason: HOSPADM

## 2025-06-02 RX ORDER — DEXAMETHASONE SODIUM PHOSPHATE 4 MG/ML
4 INJECTION, SOLUTION INTRA-ARTICULAR; INTRALESIONAL; INTRAMUSCULAR; INTRAVENOUS; SOFT TISSUE
Status: DISCONTINUED | OUTPATIENT
Start: 2025-06-02 | End: 2025-06-02 | Stop reason: HOSPADM

## 2025-06-02 RX ORDER — LIDOCAINE HYDROCHLORIDE 20 MG/ML
INJECTION, SOLUTION INFILTRATION; PERINEURAL PRN
Status: DISCONTINUED | OUTPATIENT
Start: 2025-06-02 | End: 2025-06-02

## 2025-06-02 RX ORDER — IOPAMIDOL 510 MG/ML
INJECTION, SOLUTION INTRAVASCULAR PRN
Status: DISCONTINUED | OUTPATIENT
Start: 2025-06-02 | End: 2025-06-02 | Stop reason: HOSPADM

## 2025-06-02 RX ORDER — SODIUM CHLORIDE, SODIUM LACTATE, POTASSIUM CHLORIDE, CALCIUM CHLORIDE 600; 310; 30; 20 MG/100ML; MG/100ML; MG/100ML; MG/100ML
INJECTION, SOLUTION INTRAVENOUS CONTINUOUS PRN
Status: DISCONTINUED | OUTPATIENT
Start: 2025-06-02 | End: 2025-06-02

## 2025-06-02 RX ORDER — CEFAZOLIN SODIUM/WATER 2 G/20 ML
2 SYRINGE (ML) INTRAVENOUS
Status: COMPLETED | OUTPATIENT
Start: 2025-06-02 | End: 2025-06-02

## 2025-06-02 RX ADMIN — ACETAMINOPHEN 975 MG: 325 TABLET ORAL at 11:32

## 2025-06-02 RX ADMIN — OXYCODONE HYDROCHLORIDE 5 MG: 5 TABLET ORAL at 13:23

## 2025-06-02 RX ADMIN — PROPOFOL 40 MG: 10 INJECTION, EMULSION INTRAVENOUS at 12:33

## 2025-06-02 RX ADMIN — PROPOFOL 20 MG: 10 INJECTION, EMULSION INTRAVENOUS at 12:35

## 2025-06-02 RX ADMIN — LIDOCAINE HYDROCHLORIDE 80 MG: 20 INJECTION, SOLUTION INFILTRATION; PERINEURAL at 12:10

## 2025-06-02 RX ADMIN — PROPOFOL 30 MG: 10 INJECTION, EMULSION INTRAVENOUS at 12:37

## 2025-06-02 RX ADMIN — SODIUM CHLORIDE, SODIUM LACTATE, POTASSIUM CHLORIDE, AND CALCIUM CHLORIDE: .6; .31; .03; .02 INJECTION, SOLUTION INTRAVENOUS at 12:09

## 2025-06-02 RX ADMIN — PROPOFOL 125 MCG/KG/MIN: 10 INJECTION, EMULSION INTRAVENOUS at 12:10

## 2025-06-02 RX ADMIN — Medication 2 G: at 12:09

## 2025-06-02 RX ADMIN — FENTANYL CITRATE 25 MCG: 50 INJECTION INTRAMUSCULAR; INTRAVENOUS at 12:33

## 2025-06-02 ASSESSMENT — ACTIVITIES OF DAILY LIVING (ADL)
ADLS_ACUITY_SCORE: 43
ADLS_ACUITY_SCORE: 46
ADLS_ACUITY_SCORE: 41
ADLS_ACUITY_SCORE: 45
ADLS_ACUITY_SCORE: 46

## 2025-06-02 ASSESSMENT — ENCOUNTER SYMPTOMS
DYSRHYTHMIAS: 1
SEIZURES: 0

## 2025-06-02 ASSESSMENT — LIFESTYLE VARIABLES: TOBACCO_USE: 1

## 2025-06-02 NOTE — DISCHARGE INSTRUCTIONS
Contacting your Doctor -   To contact a doctor, call Dr Jama Ray at [phone]--Urology Clinic  or:  [phone] and ask for the resident on call for Urology (answered 24 hours a day)   Emergency Department:  [facility]: [phone]  [facility]: [phone] 911 if you are in need of immediate or emergent help   FOLLOW-UP  Call the urology clinic to schedule your next stent exchange in 4 months.  Follow-up in clinic and needed if you have problems.  Call or return sooner than your regularly scheduled visit if you develop any of the following:    Fever  Uncontrolled pain  Uncontrolled nausea or vomiting  Shortness of breath  Chest pain  Any other symptoms that are worrisome to you    ACTIVITY  No strenuous exercise for 2 days.  Do not drive until you are off of narcotic pain medication and can press the brake pedal quickly and fully without pain.   Do not operate a motor vehicle while taking narcotic pain medications.     PAIN MEDICATIONS  Take pain medication as needed for pain.    Do not take any additional Tylenol (acetaminophen) while using Percocet or Vicodin  Do not take more than 3,000mg of Tylenol (acetaminophen) in any 24 hour period, as this can cause liver damage.  Wean yourself off of narcotic pain medications as tolerated    PREVENTION OF CONSTIPATION  Narcotic pain medication can cause constipation.  To prevent this, use the following measures.  Start with the treatments at the top and progress down the list until you have relief.  Prevention is important as this problem is easier to prevent than treat.    Stool softener such as Senna or Colace.  This will typically be prescribed for your  Fiber products such as Metamucil.  This is available over the counter without a prescription.  Miralax.  This is available over the counter without a prescription.  Do not use this product if you have significant renal failure.  Enema.  This is available over the counter without a  prescription.  Some enemas may not be used if you have significant renal failure.        QUESTIONS  You may call the Urology Clinic during daytime hours at [phone].  If after hours, call [phone] and ask to speak with the Urology resident on call.

## 2025-06-02 NOTE — ANESTHESIA PREPROCEDURE EVALUATION
Anesthesia Pre-Procedure Evaluation    Patient: Shailesh Hartman   MRN: [id number] : 1932          Procedure : Procedure(s):  CYSTOSCOPY, WITH RETROGRADE PYELOGRAM AND URETERAL STENT REPLACEMENT - LEFT         Past Medical History:   Diagnosis Date    Pacemaker       Past Surgical History:   Procedure Laterality Date    ADENOIDECTOMY      BACK SURGERY      CYSTOSCOPY, RETROGRADES, INSERT STENT URETER(S), COMBINED Left 2025    Procedure: CYSTOURETEROSCOPY, retrograde pyelogram. left ureteral biopsy, stent placement left ureter;  Surgeon: Jama Ray MD;  Location: [facility] OR    HIP SURGERY      HIP SURGERY      IR LUMBAR EPIDURAL STEROID INJECTION  2005    JOINT REPLACEMENT Right     QUYNH    VT LAP,PROSTATECTOMY,RADICAL,W/NERVE SPARE,INCL ROBOTIC Bilateral 2016    Procedure: ROBOTIC BILATERAL PELVIC LYMPH NODE DISSECTION;  Surgeon: Miak Lugo MD;  Location: [facility];  Service: Urology    TONSILLECTOMY        Allergies   Allergen Reactions    Seasonal Allergies Other (See Comments)     Running nose    Molds & Smuts Other (See Comments)     Sneezing, coongestion    mold extract    Soap Other (See Comments)     Skin irritation    Shaving Soap and most deodarants cause skin irritation.    Wheat Other (See Comments) and Unknown     Runny nose  Runny nose        Social History     Tobacco Use    Smoking status: Former    Smokeless tobacco: Never   Substance Use Topics    Alcohol use: Not Currently      Wt Readings from Last 1 Encounters:   25 90.5 kg (199 lb 9.6 oz)        Anesthesia Evaluation   Pt has had prior anesthetic.     No history of anesthetic complications       ROS/MED HX  ENT/Pulmonary:     (+)                tobacco use, Past use,                       Neurologic:    (-) no seizures and no CVA   Cardiovascular: Comment: Pacemaker 2025  Normal dual chamber pacemaker function. This is a [employer] transmission. The device was interrogated to  assess data and settings. No atrial or ventricular high rate episodes noted. RV paced 99%. Battery voltage WNL. [employer] transmission in 3 months. Please contact [phone] if any questions.       (+)  hypertension- -   -  - -           HUERTA.   pacemaker, Reason placed: bradycardia.   - Patient is dependent on pacemaker.      dysrhythmias, a-flutter,        Previous cardiac testing (7/2024)   Echo: Date: Results:    Stress Test:  Date: 04/2024 Results:  STRESS IMAGES:Dobutamine stress echocardiogram negative for myocardial ischemia. No regional wall motion abnormalities with stress. Ejection fraction response from 60% at rest to 70% at peak stress. Left ventricular end-systolic volume decreased with   stress.     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      Other:      (+)  , H/O Chronic Pain,           Physical Exam  Airway  Cardiovascular  Comments: Pacemaker 1/29/2025  Normal dual chamber pacemaker function. This is a [employer] transmission. The device was interrogated to assess data and settings. No atrial or ventricular high rate episodes noted. RV paced 99%. Battery voltage WNL. [employer] transmission in 3 months. Please contact [phone] if any questions.     Dental     Pulmonary       Neurological   Other Findings       OUTSIDE LABS:  CBC:   Lab Results   Component Value Date    WBC 6.8 05/21/2025    HGB 11.0 (L) 05/21/2025    HCT 34.9 (L) 05/21/2025      "05/21/2025     BMP:   Lab Results   Component Value Date     05/21/2025    POTASSIUM 4.7 05/21/2025    CHLORIDE 107 05/21/2025    CO2 23 05/21/2025    BUN 32.4 (H) 05/21/2025    CR 1.19 (H) 05/21/2025    CR 1.18 06/29/2016    GLC 90 05/21/2025    GLC 99 05/07/2025     COAGS: No results found for: \"PTT\", \"INR\", \"FIBR\"  POC: No results found for: \"BGM\", \"HCG\", \"HCGS\"  HEPATIC:   Lab Results   Component Value Date    ALBUMIN 4.4 05/21/2025    PROTTOTAL 6.5 05/21/2025    ALT 12 05/21/2025    AST 16 05/21/2025    ALKPHOS 95 05/21/2025    BILITOTAL 0.3 05/21/2025     OTHER:   Lab Results   Component Value Date    NAS 9.6 05/21/2025    LIPASE 40 05/21/2025    AMYLASE 68 05/21/2025    TSH 1.01 05/21/2025    T4 1.14 05/21/2025       Anesthesia Plan    ASA Status:  3      NPO Status: NPO Appropriate      Techniques and Equipment:       - Monitoring Plan: standard ASA monitoring     Consents    Anesthesia Plan(s) and associated risks, benefits, and realistic alternatives discussed. Questions answered and patient/representative(s) expressed understanding.     - Discussed:     - Discussed with:  Patient          Blood Consent:      - Discussed with: patient.     Postoperative Care         Comments:    Other Comments: Day of Surgery evaluation 06/02/25: Patient identify confirmed, history reviewed and no recent changes. NPO appropriate. Anesthesia plan discussed and all questions answered.                   Angus Gallego MD    I have reviewed the pertinent notes and labs in the chart from the past 30 days and (re)examined the patient.  Any updates or changes from those notes are reflected in this note.    Clinically Significant Risk Factors Present on Admission                   # Hypertension: Noted on problem list           # Obesity: Estimated body mass index is 30.75 kg/m  as calculated from the following:    Height as of 5/16/25: 1.716 m (5' 7.56\").    Weight as of 5/21/25: 90.5 kg (199 lb 9.6 oz).                    "

## 2025-06-02 NOTE — OR NURSING
Device RN returned post-procedure page. No changes were made during the case. Patient okay to discharge home per device RN.

## 2025-06-02 NOTE — ANESTHESIA CARE TRANSFER NOTE
Patient: Shailesh Hartman    Procedure: Procedure(s):  CYSTOSCOPY, WITH RETROGRADE PYELOGRAM AND URETERAL STENT REPLACEMENT - LEFT       Diagnosis: Stricture or kinking of ureter [N13.5]  Diagnosis Additional Information: No value filed.    Anesthesia Type:   No value filed.     Note:    Oropharynx: oropharynx clear of all foreign objects and spontaneously breathing  Level of Consciousness: awake  Oxygen Supplementation: room air    Independent Airway: airway patency satisfactory and stable  Dentition: dentition unchanged  Vital Signs Stable: post-procedure vital signs reviewed and stable  Report to RN Given: handoff report given  Patient transferred to: PACU    Handoff Report: Identifed the Patient, Identified the Reponsible Provider, Reviewed the pertinent medical history, Discussed the surgical course, Reviewed Intra-OP anesthesia mangement and issues during anesthesia, Set expectations for post-procedure period and Allowed opportunity for questions and acknowledgement of understanding    Vitals:  Vitals Value Taken Time   /65 06/02/25 12:57   Temp 36    Pulse 60    Resp 16    SpO2 98 % 06/02/25 12:58   Vitals shown include unfiled device data.    Electronically Signed By: NADIR Goss CRNA  June 2, 2025  12:59 PM

## 2025-06-02 NOTE — OR NURSING
Shailesh Hartman. Patient would like MD to evaluate catheter due to pain please. Nicole [phone]

## 2025-06-02 NOTE — ANESTHESIA POSTPROCEDURE EVALUATION
Patient: Shailesh Hartman    Procedure: Procedure(s):  CYSTOSCOPY, WITH RETROGRADE PYELOGRAM AND URETERAL STENT REPLACEMENT - LEFT       Anesthesia Type:  No value filed.    Note:  Disposition: Outpatient   Postop Pain Control: Uneventful            Sign Out: Well controlled pain   PONV: No   Neuro/Psych: Uneventful            Sign Out: Acceptable/Baseline neuro status   Airway/Respiratory: Uneventful            Sign Out: Acceptable/Baseline resp. status   CV/Hemodynamics: Uneventful            Sign Out: Acceptable CV status; No obvious hypovolemia; No obvious fluid overload   Other NRE: NONE   DID A NON-ROUTINE EVENT OCCUR? No           Last vitals:  Vitals Value Taken Time   /65 06/02/25 13:00   Temp     Pulse     Resp     SpO2 98 % 06/02/25 13:12   Vitals shown include unfiled device data.    Electronically Signed By: Angus Gallego MD  June 2, 2025  1:37 PM

## 2025-06-02 NOTE — PROGRESS NOTES
I have reviewed the H & P that is linked to this encounter and examined the patient.  There are no significant changes.

## 2025-06-02 NOTE — OP NOTE
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    POSTOPERATIVE DIAGNOSIS: Same    PROCEDURES PERFORMED:   Cystoscopy  Exchange of left ureteral stent  Interpretation of fluoroscopic images    FINDINGS:   Left retrograde pyelogram showed mild to moderate hydronephrosis.    STAFF SURGEON: Jama Ray MD   RESIDENT SURGEON: None  ANESTHESIA: Monitored anesthesia care  ESTIMATED BLOOD LOSS: 0cc   FLUIDS: Please see anesthesia report.   DRAINS AND TUBES:   Left [id number] double J stent.    PREOPERATIVE INDICATIONS FOR THE PROCEDURE: He is here for left stent exchange.  He has left ureteral obstruction likely due to urothelial carcinoma.    DESCRIPTION OF PROCEDURE: After obtaining informed consent, the patient was brought to the operating room, placed in supine position on operating table. After adequate anesthesia, he was moved to dorsal lithotomy position and prepped and draped in standard sterile fashion.   Procedure was initiated by inserting a #22-Burkinan rigid cystoscope into the patient's bladder.  The urethra was normal. The prostate was about 4 cm in length and was causing mild obstruction.  No significant intravesicle median lobe was appreciated. There were no tumors, stones or diverticula within the bladder. Bilateral ureteral orifices were in their normal positions and seemed to be effluxing clear urine.    The left ureteral stent was grasped and brought to the meatus.  A sensor guide wire was passed and stent was removed.  A 5-Burkinan open-ended catheter was placed over the wire and retrograde was done.  Retrograde pyelogram findings are listed above in the findings section. The 5 Malay catheter was removed and double J stent  was placed (see Drains section above for size).  Position was confirmed fluoroscopically.    We then placed a 14 Burkinan Barron catheter.  The bladder was then drained and the patient was awakened from anesthesia.     FOLLOW-UP: I  will plan to follow-up with [facility]  for stent exchange in 4 months.      CC  Patient Care Team:  Mar Perales MD as PCP - General (Family Medicine)  Mar Perales MD (Family Practice)  Tiffanie Best MD as Assigned Musculoskeletal Provider  Jama Ray MD as Assigned Surgical Provider  Sumit Hutton MD as MD (Dermatology)  Roni Wilson MD as Physician (Internal Medicine-Hematology & Oncology)  Jama Ray MD as MD (Urology)  Apolonia Dailey RN as Specialty Care Coordinator  Roni Wilson MD as Assigned Cancer Care Provider  Sumit Hutton MD as Assigned Dermatology Provider      Copy to patient  L[facility]  [id number] [facility] [id number]

## 2025-06-03 ENCOUNTER — DOCUMENTATION ONLY (OUTPATIENT)
Dept: UROLOGY | Facility: CLINIC | Age: OVER 89
End: 2025-06-03
Payer: MEDICARE

## 2025-06-03 ENCOUNTER — TELEPHONE (OUTPATIENT)
Dept: UROLOGY | Facility: CLINIC | Age: OVER 89
End: 2025-06-03
Payer: MEDICARE

## 2025-06-03 NOTE — TELEPHONE ENCOUNTER
[facility] Call Center    Phone Message    May a detailed message be left on voicemail: yes     Reason for Call: Other: Patient called to schedule a cystoscopy including a stent replacement in four months per Dr. MIKE Ray. Please call back patient to schedule.     Action Taken: Message routed to:  [facility] ([facility] Urology    Travel Screening: Not Applicable

## 2025-06-03 NOTE — TELEPHONE ENCOUNTER
Called patient to schedule surgery with Dr. Ray     Spoke with: Shailesh (patient) and Sofia (spouse)    Date of Surgery: 10/06/2025 - per case request    Estimated Arrival time Discussed with Patient:  No    Location of surgery: [facility] OR     Pre-Op H&P: Instructed to schedule w/ PCP - Mar Perales MD within 10-30 days of surgery     Labs: No     Imaging: No     Post-Op Appt Date: Not indicated in Case Request       Post-Op Imaging Date:  No     Discussed with patient pre-op RN will call 2-3 days prior to surgery with arrival time and instructions:  Yes     Standard Surgery Packet Sent: Yes 06/03/25  via Mail - Standard      Additional Comments: Patient is aware to have a  for surgery.       All patients questions were answered and was instructed to review surgical packet and call back with any questions or concerns.       Cruz Bryan on 6/3/2025 at 4:42 PM

## 2025-06-03 NOTE — PROGRESS NOTES
"Call received from patient. He reports that he has had \"quite a bit of blood\" in his underwear since his procedure yesterday. He states that, initially, the urine in his catheter bag was red, but has become yellow over night. He had some clots at the end of his penis this morning as well as blood that continues to drip from there. I let him know that some bleeding is normal and that this should continue to slow over the next 2 days. He should monitor for; clots or thick blood in his catheter, particularly in combination with diminished or lack of urine output. Instructed him also to monitor for any signs of excess bleeding such as; confusion, dizziness, weakness, chest pain or difficulty breathing and be seen in an ED if any of this develops. He voices understanding.      Thank you,  Ting Tolentino RN, BSN   Urology Triage Nurse    "

## 2025-06-04 ENCOUNTER — ALLIED HEALTH/NURSE VISIT (OUTPATIENT)
Dept: UROLOGY | Facility: CLINIC | Age: OVER 89
End: 2025-06-04
Payer: MEDICARE

## 2025-06-04 DIAGNOSIS — R33.9 URINARY RETENTION: Primary | ICD-10-CM

## 2025-06-04 LAB
MDC_IDC_EPISODE_DTM: NORMAL
MDC_IDC_EPISODE_DURATION: 3 S
MDC_IDC_EPISODE_ID: 1
MDC_IDC_EPISODE_TYPE: NORMAL
MDC_IDC_EPISODE_TYPE_INDUCED: NO
MDC_IDC_LEAD_CONNECTION_STATUS: NORMAL
MDC_IDC_LEAD_CONNECTION_STATUS: NORMAL
MDC_IDC_LEAD_IMPLANT_DT: NORMAL
MDC_IDC_LEAD_IMPLANT_DT: NORMAL
MDC_IDC_LEAD_LOCATION: NORMAL
MDC_IDC_LEAD_LOCATION: NORMAL
MDC_IDC_LEAD_LOCATION_DETAIL_1: NORMAL
MDC_IDC_LEAD_LOCATION_DETAIL_1: NORMAL
MDC_IDC_LEAD_MFG: NORMAL
MDC_IDC_LEAD_MFG: NORMAL
MDC_IDC_LEAD_MODEL: NORMAL
MDC_IDC_LEAD_MODEL: NORMAL
MDC_IDC_LEAD_POLARITY_TYPE: NORMAL
MDC_IDC_LEAD_POLARITY_TYPE: NORMAL
MDC_IDC_LEAD_SERIAL: NORMAL
MDC_IDC_LEAD_SERIAL: NORMAL
MDC_IDC_LEAD_SPECIAL_FUNCTION: NORMAL
MDC_IDC_LEAD_SPECIAL_FUNCTION: NORMAL
MDC_IDC_MSMT_BATTERY_DTM: NORMAL
MDC_IDC_MSMT_BATTERY_REMAINING_LONGEVITY: 116 MO
MDC_IDC_MSMT_BATTERY_RRT_TRIGGER: 2.62
MDC_IDC_MSMT_BATTERY_STATUS: NORMAL
MDC_IDC_MSMT_BATTERY_VOLTAGE: 3.01 V
MDC_IDC_MSMT_LEADCHNL_RA_IMPEDANCE_VALUE: 304 OHM
MDC_IDC_MSMT_LEADCHNL_RA_IMPEDANCE_VALUE: 399 OHM
MDC_IDC_MSMT_LEADCHNL_RA_PACING_THRESHOLD_AMPLITUDE: 0.75 V
MDC_IDC_MSMT_LEADCHNL_RA_PACING_THRESHOLD_PULSEWIDTH: 0.4 MS
MDC_IDC_MSMT_LEADCHNL_RA_SENSING_INTR_AMPL: 2.75 MV
MDC_IDC_MSMT_LEADCHNL_RA_SENSING_INTR_AMPL: 2.88 MV
MDC_IDC_MSMT_LEADCHNL_RV_IMPEDANCE_VALUE: 361 OHM
MDC_IDC_MSMT_LEADCHNL_RV_IMPEDANCE_VALUE: 475 OHM
MDC_IDC_MSMT_LEADCHNL_RV_PACING_THRESHOLD_AMPLITUDE: 0.5 V
MDC_IDC_MSMT_LEADCHNL_RV_PACING_THRESHOLD_PULSEWIDTH: 0.4 MS
MDC_IDC_MSMT_LEADCHNL_RV_SENSING_INTR_AMPL: 11.75 MV
MDC_IDC_MSMT_LEADCHNL_RV_SENSING_INTR_AMPL: 12.12 MV
MDC_IDC_PG_IMPLANT_DTM: NORMAL
MDC_IDC_PG_MFG: NORMAL
MDC_IDC_PG_MODEL: NORMAL
MDC_IDC_PG_SERIAL: NORMAL
MDC_IDC_PG_TYPE: NORMAL
MDC_IDC_SESS_CLINIC_NAME: NORMAL
MDC_IDC_SESS_DTM: NORMAL
MDC_IDC_SESS_TYPE: NORMAL
MDC_IDC_SET_BRADY_AT_MODE_SWITCH_RATE: 171 {BEATS}/MIN
MDC_IDC_SET_BRADY_HYSTRATE: NORMAL
MDC_IDC_SET_BRADY_LOWRATE: 60 {BEATS}/MIN
MDC_IDC_SET_BRADY_MAX_SENSOR_RATE: 120 {BEATS}/MIN
MDC_IDC_SET_BRADY_MAX_TRACKING_RATE: 120 {BEATS}/MIN
MDC_IDC_SET_BRADY_MODE: NORMAL
MDC_IDC_SET_BRADY_PAV_DELAY_LOW: 180 MS
MDC_IDC_SET_BRADY_SAV_DELAY_LOW: 150 MS
MDC_IDC_SET_LEADCHNL_RA_PACING_AMPLITUDE: 1.5 V
MDC_IDC_SET_LEADCHNL_RA_PACING_ANODE_ELECTRODE_1: NORMAL
MDC_IDC_SET_LEADCHNL_RA_PACING_ANODE_LOCATION_1: NORMAL
MDC_IDC_SET_LEADCHNL_RA_PACING_CAPTURE_MODE: NORMAL
MDC_IDC_SET_LEADCHNL_RA_PACING_CATHODE_ELECTRODE_1: NORMAL
MDC_IDC_SET_LEADCHNL_RA_PACING_CATHODE_LOCATION_1: NORMAL
MDC_IDC_SET_LEADCHNL_RA_PACING_POLARITY: NORMAL
MDC_IDC_SET_LEADCHNL_RA_PACING_PULSEWIDTH: 0.4 MS
MDC_IDC_SET_LEADCHNL_RA_SENSING_ANODE_ELECTRODE_1: NORMAL
MDC_IDC_SET_LEADCHNL_RA_SENSING_ANODE_LOCATION_1: NORMAL
MDC_IDC_SET_LEADCHNL_RA_SENSING_CATHODE_ELECTRODE_1: NORMAL
MDC_IDC_SET_LEADCHNL_RA_SENSING_CATHODE_LOCATION_1: NORMAL
MDC_IDC_SET_LEADCHNL_RA_SENSING_POLARITY: NORMAL
MDC_IDC_SET_LEADCHNL_RA_SENSING_SENSITIVITY: 0.3 MV
MDC_IDC_SET_LEADCHNL_RV_PACING_AMPLITUDE: 2 V
MDC_IDC_SET_LEADCHNL_RV_PACING_ANODE_ELECTRODE_1: NORMAL
MDC_IDC_SET_LEADCHNL_RV_PACING_ANODE_LOCATION_1: NORMAL
MDC_IDC_SET_LEADCHNL_RV_PACING_CAPTURE_MODE: NORMAL
MDC_IDC_SET_LEADCHNL_RV_PACING_CATHODE_ELECTRODE_1: NORMAL
MDC_IDC_SET_LEADCHNL_RV_PACING_CATHODE_LOCATION_1: NORMAL
MDC_IDC_SET_LEADCHNL_RV_PACING_POLARITY: NORMAL
MDC_IDC_SET_LEADCHNL_RV_PACING_PULSEWIDTH: 0.4 MS
MDC_IDC_SET_LEADCHNL_RV_SENSING_ANODE_ELECTRODE_1: NORMAL
MDC_IDC_SET_LEADCHNL_RV_SENSING_ANODE_LOCATION_1: NORMAL
MDC_IDC_SET_LEADCHNL_RV_SENSING_CATHODE_ELECTRODE_1: NORMAL
MDC_IDC_SET_LEADCHNL_RV_SENSING_CATHODE_LOCATION_1: NORMAL
MDC_IDC_SET_LEADCHNL_RV_SENSING_POLARITY: NORMAL
MDC_IDC_SET_LEADCHNL_RV_SENSING_SENSITIVITY: 0.9 MV
MDC_IDC_SET_ZONE_DETECTION_INTERVAL: 350 MS
MDC_IDC_SET_ZONE_DETECTION_INTERVAL: 400 MS
MDC_IDC_SET_ZONE_STATUS: NORMAL
MDC_IDC_SET_ZONE_TYPE: NORMAL
MDC_IDC_SET_ZONE_VENDOR_TYPE: NORMAL
MDC_IDC_STAT_AT_BURDEN_PERCENT: 0.1 %
MDC_IDC_STAT_AT_DTM_END: NORMAL
MDC_IDC_STAT_AT_DTM_START: NORMAL
MDC_IDC_STAT_BRADY_AP_VP_PERCENT: 63.11 %
MDC_IDC_STAT_BRADY_AP_VS_PERCENT: 0.02 %
MDC_IDC_STAT_BRADY_AS_VP_PERCENT: 36.34 %
MDC_IDC_STAT_BRADY_AS_VS_PERCENT: 0.54 %
MDC_IDC_STAT_BRADY_DTM_END: NORMAL
MDC_IDC_STAT_BRADY_DTM_START: NORMAL
MDC_IDC_STAT_BRADY_RA_PERCENT_PACED: 63.38 %
MDC_IDC_STAT_BRADY_RV_PERCENT_PACED: 99.44 %
MDC_IDC_STAT_EPISODE_RECENT_COUNT: 0
MDC_IDC_STAT_EPISODE_RECENT_COUNT_DTM_END: NORMAL
MDC_IDC_STAT_EPISODE_RECENT_COUNT_DTM_START: NORMAL
MDC_IDC_STAT_EPISODE_TOTAL_COUNT: 0
MDC_IDC_STAT_EPISODE_TOTAL_COUNT: 1
MDC_IDC_STAT_EPISODE_TOTAL_COUNT_DTM_END: NORMAL
MDC_IDC_STAT_EPISODE_TOTAL_COUNT_DTM_START: NORMAL
MDC_IDC_STAT_EPISODE_TYPE: NORMAL
MDC_IDC_STAT_TACHYTHERAPY_RECENT_DTM_END: NORMAL
MDC_IDC_STAT_TACHYTHERAPY_RECENT_DTM_START: NORMAL
MDC_IDC_STAT_TACHYTHERAPY_TOTAL_DTM_END: NORMAL
MDC_IDC_STAT_TACHYTHERAPY_TOTAL_DTM_START: NORMAL

## 2025-06-04 PROCEDURE — 51702 INSERT TEMP BLADDER CATH: CPT

## 2025-06-04 NOTE — PROGRESS NOTES
Shailesh Hartman comes into clinic today at the request of Dr Ray Ordering Provider for catheter check.  Patient had stent exchange on 6/2/25 with Dr Ray in the OR. Today he calls to say the tip of his penis is extremely sore and hurts when he walks. Assessed the end penis which without redness or swelling. Catheter draining clear light yellow urine. The catheter was secured closer to inner thigh on right leg.   Patient felt somewhat better with repositioning. Patient will call back if needed       This service provided today was under the supervising provider of the day Josh Ray PA-C, who was available if needed.    Ginger Galvez RN

## 2025-06-09 ENCOUNTER — ONCOLOGY VISIT (OUTPATIENT)
Dept: ONCOLOGY | Facility: CLINIC | Age: OVER 89
End: 2025-06-09
Payer: MEDICARE

## 2025-06-09 ENCOUNTER — INFUSION THERAPY VISIT (OUTPATIENT)
Dept: ONCOLOGY | Facility: CLINIC | Age: OVER 89
End: 2025-06-09
Attending: INTERNAL MEDICINE
Payer: MEDICARE

## 2025-06-09 VITALS
DIASTOLIC BLOOD PRESSURE: 72 MMHG | RESPIRATION RATE: 16 BRPM | TEMPERATURE: 97.7 F | HEART RATE: 78 BPM | WEIGHT: 197.8 LBS | BODY MASS INDEX: 30.08 KG/M2 | SYSTOLIC BLOOD PRESSURE: 143 MMHG | OXYGEN SATURATION: 96 %

## 2025-06-09 DIAGNOSIS — C77.0 METASTASIS TO CERVICAL LYMPH NODE (H): ICD-10-CM

## 2025-06-09 DIAGNOSIS — C66.2 CANCER OF LEFT URETER (H): ICD-10-CM

## 2025-06-09 DIAGNOSIS — Z79.899 HIGH RISK MEDICATION USE: ICD-10-CM

## 2025-06-09 DIAGNOSIS — C77.0 METASTASIS TO CERVICAL LYMPH NODE (H): Primary | ICD-10-CM

## 2025-06-09 DIAGNOSIS — D11.0 PLEOMORPHIC ADENOMA OF PAROTID GLAND: ICD-10-CM

## 2025-06-09 DIAGNOSIS — C66.2 CANCER OF LEFT URETER (H): Primary | ICD-10-CM

## 2025-06-09 LAB
ALBUMIN SERPL BCG-MCNC: 4.3 G/DL (ref 3.5–5.2)
ALP SERPL-CCNC: 112 U/L (ref 40–150)
ALT SERPL W P-5'-P-CCNC: 8 U/L (ref 0–70)
AMYLASE SERPL-CCNC: 70 U/L (ref 28–100)
ANION GAP SERPL CALCULATED.3IONS-SCNC: 12 MMOL/L (ref 7–15)
AST SERPL W P-5'-P-CCNC: 17 U/L (ref 0–45)
BASOPHILS # BLD AUTO: 0.1 10E3/UL (ref 0–0.2)
BASOPHILS NFR BLD AUTO: 1 %
BILIRUB SERPL-MCNC: 0.3 MG/DL
BUN SERPL-MCNC: 32.7 MG/DL (ref 8–23)
CALCIUM SERPL-MCNC: 9.6 MG/DL (ref 8.8–10.4)
CHLORIDE SERPL-SCNC: 105 MMOL/L (ref 98–107)
CORTIS SERPL-MCNC: 6.7 UG/DL
CREAT SERPL-MCNC: 1.16 MG/DL (ref 0.67–1.17)
EGFRCR SERPLBLD CKD-EPI 2021: 59 ML/MIN/1.73M2
EOSINOPHIL # BLD AUTO: 0.3 10E3/UL (ref 0–0.7)
EOSINOPHIL NFR BLD AUTO: 4 %
ERYTHROCYTE [DISTWIDTH] IN BLOOD BY AUTOMATED COUNT: 13.8 % (ref 10–15)
GLUCOSE SERPL-MCNC: 90 MG/DL (ref 70–99)
HCO3 SERPL-SCNC: 22 MMOL/L (ref 22–29)
HCT VFR BLD AUTO: 33.8 % (ref 40–53)
HGB BLD-MCNC: 10.7 G/DL (ref 13.3–17.7)
IMM GRANULOCYTES # BLD: 0 10E3/UL
IMM GRANULOCYTES NFR BLD: 1 %
LIPASE SERPL-CCNC: 31 U/L (ref 13–60)
LYMPHOCYTES # BLD AUTO: 1 10E3/UL (ref 0.8–5.3)
LYMPHOCYTES NFR BLD AUTO: 14 %
MCH RBC QN AUTO: 30.6 PG (ref 26.5–33)
MCHC RBC AUTO-ENTMCNC: 31.7 G/DL (ref 31.5–36.5)
MCV RBC AUTO: 97 FL (ref 78–100)
MONOCYTES # BLD AUTO: 0.7 10E3/UL (ref 0–1.3)
MONOCYTES NFR BLD AUTO: 9 %
NEUTROPHILS # BLD AUTO: 5.2 10E3/UL (ref 1.6–8.3)
NEUTROPHILS NFR BLD AUTO: 72 %
NRBC # BLD AUTO: 0 10E3/UL
NRBC BLD AUTO-RTO: 0 /100
PLATELET # BLD AUTO: 192 10E3/UL (ref 150–450)
POTASSIUM SERPL-SCNC: 4.7 MMOL/L (ref 3.4–5.3)
PROT SERPL-MCNC: 6.6 G/DL (ref 6.4–8.3)
RBC # BLD AUTO: 3.5 10E6/UL (ref 4.4–5.9)
SODIUM SERPL-SCNC: 139 MMOL/L (ref 135–145)
T4 FREE SERPL-MCNC: 1.21 NG/DL (ref 0.9–1.7)
TSH SERPL DL<=0.005 MIU/L-ACNC: 0.75 UIU/ML (ref 0.3–4.2)
WBC # BLD AUTO: 7.1 10E3/UL (ref 4–11)

## 2025-06-09 PROCEDURE — G0463 HOSPITAL OUTPT CLINIC VISIT: HCPCS

## 2025-06-09 PROCEDURE — 96413 CHEMO IV INFUSION 1 HR: CPT

## 2025-06-09 PROCEDURE — 36415 COLL VENOUS BLD VENIPUNCTURE: CPT

## 2025-06-09 PROCEDURE — 84443 ASSAY THYROID STIM HORMONE: CPT

## 2025-06-09 PROCEDURE — 258N000003 HC RX IP 258 OP 636

## 2025-06-09 PROCEDURE — 83690 ASSAY OF LIPASE: CPT

## 2025-06-09 PROCEDURE — 84439 ASSAY OF FREE THYROXINE: CPT

## 2025-06-09 PROCEDURE — 84132 ASSAY OF SERUM POTASSIUM: CPT

## 2025-06-09 PROCEDURE — 82150 ASSAY OF AMYLASE: CPT

## 2025-06-09 PROCEDURE — 82533 TOTAL CORTISOL: CPT

## 2025-06-09 PROCEDURE — 99215 OFFICE O/P EST HI 40 MIN: CPT

## 2025-06-09 PROCEDURE — 85025 COMPLETE CBC W/AUTO DIFF WBC: CPT

## 2025-06-09 PROCEDURE — 250N000011 HC RX IP 250 OP 636: Mod: JZ

## 2025-06-09 RX ORDER — DIPHENHYDRAMINE HYDROCHLORIDE 50 MG/ML
25 INJECTION, SOLUTION INTRAMUSCULAR; INTRAVENOUS
Status: CANCELLED
Start: 2025-06-12

## 2025-06-09 RX ORDER — METHYLPREDNISOLONE SODIUM SUCCINATE 40 MG/ML
40 INJECTION INTRAMUSCULAR; INTRAVENOUS
Status: CANCELLED
Start: 2025-06-12

## 2025-06-09 RX ORDER — TAMSULOSIN HYDROCHLORIDE 0.4 MG/1
0.4 CAPSULE ORAL DAILY
COMMUNITY

## 2025-06-09 RX ORDER — CHOLECALCIFEROL (VITD3)/VIT K2 1250-200
CAPSULE ORAL
COMMUNITY

## 2025-06-09 RX ORDER — DIPHENHYDRAMINE HYDROCHLORIDE 50 MG/ML
50 INJECTION, SOLUTION INTRAMUSCULAR; INTRAVENOUS
Status: CANCELLED
Start: 2025-06-12

## 2025-06-09 RX ORDER — LORAZEPAM 2 MG/ML
0.5 INJECTION INTRAMUSCULAR EVERY 4 HOURS PRN
Status: CANCELLED | OUTPATIENT
Start: 2025-06-12

## 2025-06-09 RX ORDER — EPINEPHRINE 1 MG/ML
0.3 INJECTION, SOLUTION INTRAMUSCULAR; SUBCUTANEOUS EVERY 5 MIN PRN
Status: CANCELLED | OUTPATIENT
Start: 2025-06-12

## 2025-06-09 RX ORDER — HEPARIN SODIUM,PORCINE 10 UNIT/ML
5-20 VIAL (ML) INTRAVENOUS DAILY PRN
Status: CANCELLED | OUTPATIENT
Start: 2025-06-12

## 2025-06-09 RX ORDER — HEPARIN SODIUM (PORCINE) LOCK FLUSH IV SOLN 100 UNIT/ML 100 UNIT/ML
5 SOLUTION INTRAVENOUS
Status: CANCELLED | OUTPATIENT
Start: 2025-06-12

## 2025-06-09 RX ORDER — ALBUTEROL SULFATE 90 UG/1
1-2 INHALANT RESPIRATORY (INHALATION)
Status: CANCELLED
Start: 2025-06-12

## 2025-06-09 RX ORDER — ALBUTEROL SULFATE 0.83 MG/ML
2.5 SOLUTION RESPIRATORY (INHALATION)
Status: CANCELLED | OUTPATIENT
Start: 2025-06-12

## 2025-06-09 RX ORDER — CALCIUM CARBONATE/VITAMIN D3 500 MG-10
1 TABLET ORAL DAILY
COMMUNITY

## 2025-06-09 RX ORDER — MEPERIDINE HYDROCHLORIDE 25 MG/ML
25 INJECTION INTRAMUSCULAR; INTRAVENOUS; SUBCUTANEOUS
Status: CANCELLED | OUTPATIENT
Start: 2025-06-12

## 2025-06-09 RX ADMIN — SODIUM CHLORIDE 250 ML: 0.9 INJECTION, SOLUTION INTRAVENOUS at 16:10

## 2025-06-09 RX ADMIN — SODIUM CHLORIDE 200 MG: 9 INJECTION, SOLUTION INTRAVENOUS at 16:07

## 2025-06-09 ASSESSMENT — PAIN SCALES - GENERAL: PAINLEVEL_OUTOF10: MILD PAIN (2)

## 2025-06-09 NOTE — PROGRESS NOTES
Infusion Nursing Note:  Shailesh Hartman presents today for Cycle 2 Day 1 Pembrolizumab.    Patient spoke with provider today: Yes: Apolonia Clark     Treatment Conditions:  Component      Latest Ref Rng 6/9/2025  2:22 PM   WBC      4.0 - 11.0 10e3/uL 7.1    RBC Count      4.40 - 5.90 10e6/uL 3.50 (L)    Hemoglobin      13.3 - 17.7 g/dL 10.7 (L)    Hematocrit      40.0 - 53.0 % 33.8 (L)    MCV      78 - 100 fL 97    MCH      26.5 - 33.0 pg 30.6    MCHC      31.5 - 36.5 g/dL 31.7    RDW      10.0 - 15.0 % 13.8    Platelet Count      150 - 450 10e3/uL 192    % Neutrophils      % 72    % Lymphocytes      % 14    % Monocytes      % 9    % Eosinophils      % 4    % Basophils      % 1    % Immature Granulocytes      % 1    NRBC/W      <1 /100 0    Absolute Neutrophil      1.6 - 8.3 10e3/uL 5.2    Absolute Lymphocytes      0.8 - 5.3 10e3/uL 1.0    Absolute Monocytes      0.0 - 1.3 10e3/uL 0.7    Absolute Eosinophils      0.0 - 0.7 10e3/uL 0.3    Absolute Basophils      0.0 - 0.2 10e3/uL 0.1    Absolute Immature Granulocytes      <=0.4 10e3/uL 0.0    Absolute NRBCs      10e3/uL 0.0    Sodium      135 - 145 mmol/L 139    Potassium      3.4 - 5.3 mmol/L 4.7    Carbon Dioxide (CO2)      22 - 29 mmol/L 22    Anion Gap      7 - 15 mmol/L 12    Urea Nitrogen      8.0 - 23.0 mg/dL 32.7 (H)    Creatinine      0.67 - 1.17 mg/dL 1.16    GFR Estimate      >60 mL/min/1.73m2 59 (L)    Calcium      8.8 - 10.4 mg/dL 9.6    Chloride      98 - 107 mmol/L 105    Glucose      70 - 99 mg/dL 90    Alkaline Phosphatase      40 - 150 U/L 112    AST      0 - 45 U/L 17    ALT      0 - 70 U/L 8    Protein Total      6.4 - 8.3 g/dL 6.6    Albumin      3.5 - 5.2 g/dL 4.3    Bilirubin Total      <=1.2 mg/dL 0.3    Amylase      28 - 100 U/L 70    Lipase      13 - 60 U/L 31    TSH      0.30 - 4.20 uIU/mL 0.75    T4 Free      0.90 - 1.70 ng/dL 1.21       Legend:  (L) Low  (H) High      Note: No concerns during infusion.   Accompanied by wife. Pt is a  retired doctor.    Intravenous Access:  Peripheral IV placed.    Post Infusion Assessment:  Patient tolerated infusion without incident.  Blood return noted pre and post infusion.  Access discontinued per protocol.    Discharge Plan:   Patient declined prescription refills.  Discharge instructions reviewed with: Patient.  Patient and/or family verbalized understanding of discharge instructions and all questions answered.  Copy of AVS reviewed with patient and/or family.  Patient will return 7/2/25 for next appointment.  AVS to patient via [employer].  Patient will return 7/2/25 for next appointment.   Patient discharged in stable condition accompanied by: wife.  Departure Mode: Ambulatory.    Kenia Babcock RN

## 2025-06-09 NOTE — Clinical Note
6/9/2025      Shailesh Hartman  [address] [address] [facility] [address]      Dear Colleague,    Thank you for referring your patient, Shailesh Hartman, to the [facility]. Please see a copy of my visit note below.    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  Patient presents with urinary retention and left hydroureteronephrosis.  CT abdomen, pelvis 02/03/2025, revealed moderate left hydroureteronephrosis with ureter dilation to the level of the distal ureter and a 6.2 cm segment of abnormal diffuse urothelial thickening and enhancement in the distal left ureter.  There was mild prostatomegaly with prostate fiducial markers in place.  Urine cytology 02/25/2025, revealed atypical cells.  Patient underwent cystoscopy ureteroscopy, retrograde left pyelogram, left ureteral biopsy and left ureteral stent placement 02/25/2025, that revealed fibrotic stromal tissue with close side effect and chronic inflammation without evidence of malignancy.  11-C choline PET/CT scan performed for surveillance for previously diagnosed prostate cancer 12/12/2024, revealed stable diffuse choline uptake in the prostate with SUV max 4.  There was a 1.2 cm moderately choline-avid level 2 left cervical lymph node.  There is no other choline-avid lymphadenopathy.  CT neck 12/31/2024, revealed a 1.2 x 1.6 x 1.7 cm level 2A left cervical lymph node corresponding to the choline-avid lymph node on prior 11-C choline PET/CT scan.  There was a 2 x 1.3 x 1.8 cm well-circumscribed enhancing homogeneous mass in the left parotid that was not choline-avid on the prior PET/CT scan. Repeat 11-C choline PET/CT scan 03/10/2025, revealed faint choline uptake throughout the  atrophic prostate that was unchanged.  There was an intensely choline-avid level 2A left cervical lymph node.  There was also an enhancing mass in the left parotid.  There are no other choline-avid lesions. Ultrasound-guided left cervical lymph node core needle biopsy 04/16/2025, revealed malignant epithelioid cells with focal areas of necrosis positive for CK AE1/AE3, GATA3, and p63 (weak), but negative for PSA and NKX3.1 consistent with urothelial carcinoma.  Pathology review at the [facility] revealed metastatic carcinoma with morphology and immunohistochemistry suggesting primary carcinoma from the salivary gland, breast, or urothelium depending on clinical and imaging correlation. 18-F FDG PET/CT scan 05/07/2025, revealed a 1.6 x 1.3 cm level 2A left cervical lymph node with SUV max 16.12 that was a previously biopsied metastatic urothelial carcinoma. There was a 1.6 x 1.5 cm right submandibular lymph node with SUV max 3.82.  There was a 2.1 x 1.3 cm enhancing anterior left parotid lymph node with SUV max 3.99 that was a previously biopsied pleomorphic adenoma.  There was mild left pelvocaliectasis with diffuse pelvicalyceal and ureteral FDG uptake favoring FDG tracer excretion through the ureteral stent, with a focus of hypermetabolism in the distal left ureter proximal to the ureteral stenosis that was indeterminate but in the location of the presumed primary lesion.  There was focal intense FDG uptake near the right posterior apex peripheral zone of the prostate with SUV max 5.3, without CT correlate.     INTERVAL HISTORY:         Patient is chemotherapy ineligible, and enfortumab vedotin is associated with significant adverse effects including peripheral neuropathy that would lead to considerable morbidity.  Patient will receive first-line pembrolizumab 200 mg IV every 21 days to begin next week.  Patient wishes to review the risks, complications, and benefits of pembrolizumab prior to  initiation of therapy.  Patient has mild fatigue, decrease in exercise tolerance, muscle weakness particularly in the lower extremities, imbalance, mild decrease in appetite, 40-pound weight loss in the past year, and an indwelling Barron catheter due to urinary retention. He is independent and is able to perform his activities of daily living and some chores.  Patient uses walking sticks for balance and mobility.  There are no other new symptoms or events since the prior clinic visit (05/08/2025). He denies fever, headache, cough, dyspnea, chest pain, abdominal pain, nausea, vomiting, constipation, diarrhea, bleeding, or bone pain.     PAST HISTORY: Past history was reviewed and unchanged from the clinic note 05/08/2025.     MEDICATIONS:   Current Outpatient Medications   Medication Sig Dispense Refill    acetaminophen (TYLENOL) 500 MG tablet Take 500-1,000 mg by mouth 3 times daily.      allopurinol (ZYLOPRIM) 300 MG tablet Take 300 mg by mouth daily      ketoconazole (NIZORAL) 2 % external shampoo Apply to the scalp for 5 minutes in the shower three times a week 120 mL 3    lisinopril (PRINIVIL,ZESTRIL) 20 MG tablet Take 20 mg by mouth 2 times daily      melatonin 3 MG tablet Take 1.5 mg by mouth nightly as needed for sleep.      Multiple Vitamin (MULTI-VITAMINS) TABS 1 tab by mouth daily      potassium chloride ER (K-TAB/KLOR-CON) 10 MEQ CR tablet TAKES 10 MEQ BID  1    simvastatin (ZOCOR) 20 MG tablet Take 1 tablet by mouth At Bedtime      triamcinolone (KENALOG) 0.1 % external ointment Apply topically 2 times daily. Apply to the right lower leg and all other itchy pink spots on the body 80 g 3    triamterene-HCTZ (DYAZIDE) 37.5-25 MG capsule TK 1 C PO D  3       REVIEW OF SYSTEMS: Review of systems reviewed with the patient and otherwise negative except for those detailed above.    PHYSICAL EXAM:  There were no vitals taken for this visit..  ECOG performance status: 2.  Physical exam was unchanged from prior  clinic visit 05/08/2025.  Skin: No erythema or rash.  HEENT: Sclera nonicteric. Oropharynx without lesions or ulceration, mucosa pink and moist.  There is a 2 cm nodule in the left parotid gland near the angle of the mandible.  Nodes: No cervical, supraclavicular, axillary, or inguinal adenopathy.  Lungs: No dullness to percussion.  No rales, wheezes, rhonchi.  Heart: Regular rate and rhythm.  Abdomen: Bowel sounds present.  Soft, nontender, no hepatosplenomegaly or mass.  Extremities: No edema.     IMPRESSION/PLAN: Stage IV urothelial carcinoma of the distal left ureter.  There is a a 6.2 cm segment of abnormal diffuse urothelial thickening and enhancement in the distal left ureter noted on CT scan (02/03/2025) with metastases to left cervical lymph node detected on FDG PET/CT scan (05/07/2025) and left cervical lymph node biopsy (04/16/2025).  There is left hydroureteronephrosis requiring left ureteral stent placement (02/25/2025) and patient also has a chronic indwelling Barron for prior urinary retention. First-line therapy typically consists of enfortumab vedotin 1.25 mg/kg IV day 1, 8 and pembrolizumab 200 mg IV day 1 every 21 days in accordance with the EV-302 clinical trial (N Engl J Med 2024;390:875-888).  Patient is elderly with an ECOG 2-3 performance status, and he is chemotherapy ineligible, and enfortumab vedotin is associated with significant adverse effects including peripheral neuropathy that would lead to considerable morbidity.  Patient will receive first-line pembrolizumab 200 mg IV every 21 days to induce a response and improve progression free survival in accordance with the phase 3 KEYNOTE-361 trial ([employer], 2017; 18 (11):1501-4439). Patient will return to the outpatient infusion center next week, for cycle 1 of pembrolizumab. I reviewed the risks and side effects of pembrolizumab with the patient, which include fatigue, weakness, anorexia, weight loss, rash, pruritus, dry eyes, dry  mouth, headache, cough, dyspnea, abdominal pain, nausea, vomiting, diarrhea, hypothyroidism, adrenal insufficiency, other endocrine disorders, cardiomyopathy, colitis, nephritis, pancreatitis, myositis, arthritis, neuropathy, cerebritis.  Patient understood the indication and risks and agreed to proceed.  Patient will return to clinic in 4 weeks with CBC, metabolic panel, amylase, lipase, TSH, free T4, cortisol. The current and past history, clinical evaluation, reviewing diagnostic tests with the patient, assessment, complex management of immune checkpoint inhibitor toxicities, and planning occurred over 30 minutes.       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  Patient presents with urinary retention and left hydroureteronephrosis.  CT abdomen, pelvis 02/03/2025, revealed moderate left hydroureteronephrosis with ureter dilation to the level of the distal ureter and a 6.2 cm segment of abnormal diffuse urothelial thickening and enhancement in the distal left ureter.  There was mild prostatomegaly with prostate fiducial markers in place.  Urine cytology 02/25/2025, revealed atypical cells.  Patient underwent cystoscopy ureteroscopy, retrograde left pyelogram, left ureteral biopsy and left ureteral stent placement 02/25/2025, that revealed fibrotic stromal tissue with close side effect and chronic inflammation without evidence of malignancy.  11-C choline PET/CT scan performed for surveillance for previously diagnosed prostate cancer 12/12/2024, revealed stable diffuse choline uptake in the prostate with SUV max 4.  There was a 1.2 cm moderately choline-avid  level 2 left cervical lymph node.  There is no other choline-avid lymphadenopathy.  CT neck 12/31/2024, revealed a 1.2 x 1.6 x 1.7 cm level 2A left cervical lymph node corresponding to the choline-avid lymph node on prior 11-C choline PET/CT scan.  There was a 2 x 1.3 x 1.8 cm well-circumscribed enhancing homogeneous mass in the left parotid that was not choline-avid on the prior PET/CT scan. Repeat 11-C choline PET/CT scan 03/10/2025, revealed faint choline uptake throughout the atrophic prostate that was unchanged.  There was an intensely choline-avid level 2A left cervical lymph node.  There was also an enhancing mass in the left parotid.  There are no other choline-avid lesions. Ultrasound-guided left cervical lymph node core needle biopsy 04/16/2025, revealed malignant epithelioid cells with focal areas of necrosis positive for CK AE1/AE3, GATA3, and p63 (weak), but negative for PSA and NKX3.1 consistent with urothelial carcinoma.  Pathology review at the [facility] revealed metastatic carcinoma with morphology and immunohistochemistry suggesting primary carcinoma from the salivary gland, breast, or urothelium depending on clinical and imaging correlation. 18-F FDG PET/CT scan 05/07/2025, revealed a 1.6 x 1.3 cm level 2A left cervical lymph node with SUV max 16.12 that was a previously biopsied metastatic urothelial carcinoma. There was a 1.6 x 1.5 cm right submandibular lymph node with SUV max 3.82.  There was a 2.1 x 1.3 cm enhancing anterior left parotid lymph node with SUV max 3.99 that was a previously biopsied pleomorphic adenoma.  There was mild left pelvocaliectasis with diffuse pelvicalyceal and ureteral FDG uptake favoring FDG tracer excretion through the ureteral stent, with a focus of hypermetabolism in the distal left ureter proximal to the ureteral stenosis that was indeterminate but in the location of the presumed primary lesion.  There was focal intense FDG uptake near the right  posterior apex peripheral zone of the prostate with SUV max 5.3, without CT correlate.     INTERVAL HISTORY:   Shailesh is seen in clinic June 9, 2025 prior to his second cycle of pembrolizumab. He is accompanied by his wife Sofia. He is feeling stable. His energy is stable. No chest pain, shortness of breath, or cough. 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 They had discussed surgical excision. He'd like a referral for a second opinion with ENT with our team. He has no diarrhea or constipation. He has eczema at baseline and applies lotion with oatmeal colloid with benefit. No new or changes in baseline eczema since starting keytruda. No headache or vision changes. No new pains reported. He has weakness and is doing PT for this. He has a catheter in place due to urinary return and ureteral stents which was most recently replaced on 06/02/2025. No new arthralgias reported.    ROS: 10 point ROS neg other than the symptoms noted above in the HPI.    PAST HISTORY: Past history was reviewed and unchanged from the clinic note 05/08/2025.     MEDICATIONS:   Current Outpatient Medications   Medication Sig Dispense Refill     acetaminophen (TYLENOL) 500 MG tablet Take 500-1,000 mg by mouth 3 times daily.       allopurinol (ZYLOPRIM) 300 MG tablet Take 300 mg by mouth daily       ketoconazole (NIZORAL) 2 % external shampoo Apply to the scalp for 5 minutes in the shower three times a week 120 mL 3     lisinopril (PRINIVIL,ZESTRIL) 20 MG tablet Take 20 mg by mouth 2 times daily       melatonin 3 MG tablet Take 1.5 mg by mouth nightly as needed for sleep.       Multiple Vitamin (MULTI-VITAMINS) TABS 1 tab by mouth daily       potassium chloride ER (K-TAB/KLOR-CON) 10 MEQ CR tablet TAKES 10 MEQ BID  1     simvastatin (ZOCOR) 20 MG tablet Take 1 tablet by mouth At Bedtime       triamcinolone (KENALOG) 0.1 % external ointment Apply topically 2 times daily. Apply to the right lower leg and  all other itchy pink spots on the body 80 g 3     triamterene-HCTZ (DYAZIDE) 37.5-25 MG capsule TK 1 C PO D  3       PHYSICAL EXAM:    BP (!) 143/72   Pulse 78   Temp 97.7  F (36.5  C) (Oral)   Resp 16   Wt 89.7 kg (197 lb 12.8 oz)   SpO2 96%   BMI 30.08 kg/m  .  ECOG performance status: 2.    General: No acute distress  HEENT: Sclera anicteric. Oral mucosa pink and moist.  No mucositis or thrush. + palpable left parotid gland nodule   Lymph: No lymphadenopathy in neck  Heart: Regular, rate, and rhythm  Lungs: Clear to ascultation bilaterally  Abdomen: Positive bowel sounds. Soft, non-distended, non-tender. No organomegaly or mass.   Extremities: no lower extremity edema  Neuro: Cranial nerves grossly intact  Rash: none  Vascular access: port      Physical exam was unchanged from prior clinic visit 05/08/2025.  Skin: No erythema or rash.  HEENT: Sclera nonicteric. Oropharynx without lesions or ulceration, mucosa pink and moist.  There is a 2 cm nodule in the left parotid gland near the angle of the mandible.  Nodes: No cervical, supraclavicular, axillary, or inguinal adenopathy.  Lungs: No dullness to percussion.  No rales, wheezes, rhonchi.  Heart: Regular rate and rhythm.  Abdomen: Bowel sounds present.  Soft, nontender, no hepatosplenomegaly or mass.  Extremities: No edema.     IMPRESSION/PLAN: Stage IV urothelial carcinoma of the distal left ureter.  There is a a 6.2 cm segment of abnormal diffuse urothelial thickening and enhancement in the distal left ureter noted on CT scan (02/03/2025) with metastases to left cervical lymph node detected on FDG PET/CT scan (05/07/2025) and left cervical lymph node biopsy (04/16/2025).  There is left hydroureteronephrosis requiring left ureteral stent placement (02/25/2025) and patient also has a chronic indwelling Barron for prior urinary retention. First-line therapy typically consists of enfortumab vedotin 1.25 mg/kg IV day 1, 8 and pembrolizumab 200 mg IV day 1 every  21 days in accordance with the EV-302 clinical trial (N Engl J Med 2024;[id number]).  Patient is elderly with an ECOG 2-3 performance status, and he is chemotherapy ineligible, and enfortumab vedotin is associated with significant adverse effects including peripheral neuropathy that would lead to considerable morbidity.  Patient will receive first-line pembrolizumab 200 mg IV every 21 days to induce a response and improve progression free survival in accordance with the phase 3 KEYNOTE-361 trial ([employer], 2017; 18 (11):3877-0727). Patient will return to the outpatient infusion center next week, for cycle 1 of pembrolizumab. I reviewed the risks and side effects of pembrolizumab with the patient, which include fatigue, weakness, anorexia, weight loss, rash, pruritus, dry eyes, dry mouth, headache, cough, dyspnea, abdominal pain, nausea, vomiting, diarrhea, hypothyroidism, adrenal insufficiency, other endocrine disorders, cardiomyopathy, colitis, nephritis, pancreatitis, myositis, arthritis, neuropathy, cerebritis.  Patient understood the indication and risks and agreed to proceed.  Patient will return to clinic in 4 weeks with CBC, metabolic panel, amylase, lipase, TSH, free T4, cortisol. The current and past history, clinical evaluation, reviewing diagnostic tests with the patient, assessment, complex management of immune checkpoint inhibitor toxicities, and planning occurred over 30 minutes.       Roni Wilson MD    cc: MD Tacho Ingram, MD Perico Jacobs      Again, thank you for allowing me to participate in the care of your patient.        Sincerely,        Apolonia Clark PA-C    Electronically signed

## 2025-06-09 NOTE — PATIENT INSTRUCTIONS
[facility] Triage and after hours / weekends / holidays:  [phone]    Please call the triage or after hours line if you experience a temperature greater than or equal to 100.4, shaking chills, have uncontrolled nausea, vomiting and/or diarrhea, dizziness, shortness of breath, chest pain, bleeding, unexplained bruising, or if you have any other new/concerning symptoms, questions or concerns.      If you are having any concerning symptoms or wish to speak to a provider before your next infusion visit, please call triage to notify them so we can adequately serve you.     If you need a refill on a narcotic prescription or other medication, please call before your infusion appointment.

## 2025-06-09 NOTE — PROGRESS NOTES
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  Patient presents with urinary retention and left hydroureteronephrosis.  CT abdomen, pelvis 02/03/2025, revealed moderate left hydroureteronephrosis with ureter dilation to the level of the distal ureter and a 6.2 cm segment of abnormal diffuse urothelial thickening and enhancement in the distal left ureter.  There was mild prostatomegaly with prostate fiducial markers in place.  Urine cytology 02/25/2025, revealed atypical cells.  Patient underwent cystoscopy ureteroscopy, retrograde left pyelogram, left ureteral biopsy and left ureteral stent placement 02/25/2025, that revealed fibrotic stromal tissue with close side effect and chronic inflammation without evidence of malignancy.  11-C choline PET/CT scan performed for surveillance for previously diagnosed prostate cancer 12/12/2024, revealed stable diffuse choline uptake in the prostate with SUV max 4.  There was a 1.2 cm moderately choline-avid level 2 left cervical lymph node.  There is no other choline-avid lymphadenopathy.  CT neck 12/31/2024, revealed a 1.2 x 1.6 x 1.7 cm level 2A left cervical lymph node corresponding to the choline-avid lymph node on prior 11-C choline PET/CT scan.  There was a 2 x 1.3 x 1.8 cm well-circumscribed enhancing homogeneous mass in the left parotid that was not choline-avid on the prior PET/CT scan. Repeat 11-C choline PET/CT scan 03/10/2025, revealed faint choline uptake throughout the atrophic prostate that was unchanged.  There was an intensely choline-avid level 2A left cervical lymph node.  There was also an enhancing mass in the left parotid.  There are no other choline-avid lesions. Ultrasound-guided left cervical lymph node core needle  biopsy 04/16/2025, revealed malignant epithelioid cells with focal areas of necrosis positive for CK AE1/AE3, GATA3, and p63 (weak), but negative for PSA and NKX3.1 consistent with urothelial carcinoma.  Pathology review at the [facility] revealed metastatic carcinoma with morphology and immunohistochemistry suggesting primary carcinoma from the salivary gland, breast, or urothelium depending on clinical and imaging correlation. 18-F FDG PET/CT scan 05/07/2025, revealed a 1.6 x 1.3 cm level 2A left cervical lymph node with SUV max 16.12 that was a previously biopsied metastatic urothelial carcinoma. There was a 1.6 x 1.5 cm right submandibular lymph node with SUV max 3.82.  There was a 2.1 x 1.3 cm enhancing anterior left parotid lymph node with SUV max 3.99 that was a previously biopsied pleomorphic adenoma.  There was mild left pelvocaliectasis with diffuse pelvicalyceal and ureteral FDG uptake favoring FDG tracer excretion through the ureteral stent, with a focus of hypermetabolism in the distal left ureter proximal to the ureteral stenosis that was indeterminate but in the location of the presumed primary lesion.  There was focal intense FDG uptake near the right posterior apex peripheral zone of the prostate with SUV max 5.3, without CT correlate.     INTERVAL HISTORY:   Shailesh is seen in clinic June 9, 2025 prior to his second cycle of pembrolizumab. He is accompanied by his wife Sofia. He is feeling stable. His energy is stable. No chest pain, shortness of breath, or cough. He has a parotid gland pleomorphic adenoma that he saw ENT at [facility] for. They had discussed surgical excision. He'd like a referral for a second opinion with ENT with our team. He has no diarrhea or constipation. He has eczema at baseline and applies lotion with oatmeal colloid with benefit. No new or changes in baseline eczema since starting keytruda. No headache or vision changes. No new pains reported. He has  weakness and is doing PT for this. He has a catheter in place due to urinary return and ureteral stents which was most recently replaced on 06/02/2025. No new arthralgias reported.    ROS: 10 point ROS neg other than the symptoms noted above in the HPI.    PAST HISTORY: Past history was reviewed and unchanged from the clinic note 05/08/2025.     MEDICATIONS:   Current Outpatient Medications   Medication Sig Dispense Refill    acetaminophen (TYLENOL) 500 MG tablet Take 500-1,000 mg by mouth 3 times daily.      allopurinol (ZYLOPRIM) 300 MG tablet Take 300 mg by mouth daily      ketoconazole (NIZORAL) 2 % external shampoo Apply to the scalp for 5 minutes in the shower three times a week 120 mL 3    lisinopril (PRINIVIL,ZESTRIL) 20 MG tablet Take 20 mg by mouth 2 times daily      melatonin 3 MG tablet Take 1.5 mg by mouth nightly as needed for sleep.      Multiple Vitamin (MULTI-VITAMINS) TABS 1 tab by mouth daily      potassium chloride ER (K-TAB/KLOR-CON) 10 MEQ CR tablet TAKES 10 MEQ BID  1    simvastatin (ZOCOR) 20 MG tablet Take 1 tablet by mouth At Bedtime      triamcinolone (KENALOG) 0.1 % external ointment Apply topically 2 times daily. Apply to the right lower leg and all other itchy pink spots on the body 80 g 3    triamterene-HCTZ (DYAZIDE) 37.5-25 MG capsule TK 1 C PO D  3       PHYSICAL EXAM:    BP (!) 143/72   Pulse 78   Temp 97.7  F (36.5  C) (Oral)   Resp 16   Wt 89.7 kg (197 lb 12.8 oz)   SpO2 96%   BMI 30.08 kg/m  .  ECOG performance status: 2.    General: No acute distress  HEENT: Sclera anicteric. Oral mucosa pink and moist.  No mucositis or thrush. + palpable left parotid gland nodule near the angle of the mandible.   Lymph: No lymphadenopathy in neck  Heart: Paced regular, rate, and rhythm  Lungs: Clear to ascultation bilaterally  Extremities: no lower extremity edema  Neuro: Cranial nerves grossly intact  Rash: none    IMPRESSION/PLAN:   #Stage IV urothelial carcinoma of the distal left  ureter.  There is a a 6.2 cm segment of abnormal diffuse urothelial thickening and enhancement in the distal left ureter noted on CT scan (02/03/2025) with metastases to left cervical lymph node detected on FDG PET/CT scan (05/07/2025) and left cervical lymph node biopsy (04/16/2025).  There is left hydroureteronephrosis requiring left ureteral stent placement (02/25/2025) and patient also has a chronic indwelling Barron for prior urinary retention.   - receiving therapy with first line pembrolizumab 200 mg every 3 weeks. He tolerated cycle 1 very well. Labs June 9, 2025 are within acceptable limits to proceed.     #ureteral stent  #urinary retention   - managed by Dr. Ray. Last stent replacement 06/2/25. Next scheduled for 10/6/25.     #Parotid gland nodule   - biopsied at [facility] consistent with a pleomorphic adenoma. Patient would like a second opinion with our ENT team. Referral placed.     #Eczema   - continue topical oatmeal lotion. Has seen Dr. Avalos     45 minutes spent on the date of the encounter doing chart review, review of test results, interpretation of tests, patient visit, and documentation     Apolonia Clark PA-C

## 2025-06-09 NOTE — NURSING NOTE
"Oncology Rooming Note    June 9, 2025 2:44 PM   Shailesh Hartman is a 89 year old male who presents for:    Chief Complaint   Patient presents with    Blood Draw     Vpt blood draw by lab RN    Oncology Clinic Visit     urothelial cancer     Initial Vitals: BP (!) 143/72   Pulse 78   Temp 97.7  F (36.5  C) (Oral)   Resp 16   Wt 89.7 kg (197 lb 12.8 oz)   SpO2 96%   BMI 30.08 kg/m   Estimated body mass index is 30.08 kg/m  as calculated from the following:    Height as of 6/2/25: 1.727 m (5' 8\").    Weight as of this encounter: 89.7 kg (197 lb 12.8 oz). Body surface area is 2.07 meters squared.  Mild Pain (2) Comment: Data Unavailable   No LMP for male patient.  Allergies reviewed: Yes  Medications reviewed: Yes    Medications: Medication refills not needed today.  Pharmacy name entered into Klique:    [id number] [place] - [address] AT [facility] OF [address]/[facility]ARMACY [facility][id number] [facility] [place] [facility] [place] [facility] [address] [facility]    Frailty Screening:   Is the patient here for a new oncology consult visit in cancer care? 2. No    PHQ9:  Did this patient require a PHQ9?: No      Clinical concerns: review      Jennifer Guthrie LPN            "

## 2025-06-09 NOTE — NURSING NOTE
Chief Complaint   Patient presents with    Blood Draw     Vpt blood draw by lab RN     Venipuncture labs drawn by lab RN, vitals taken and appointment arrived.    Bertha Pierre RN

## 2025-06-10 ENCOUNTER — PATIENT OUTREACH (OUTPATIENT)
Dept: CARE COORDINATION | Facility: CLINIC | Age: OVER 89
End: 2025-06-10
Payer: MEDICARE

## 2025-06-10 ENCOUNTER — TELEPHONE (OUTPATIENT)
Dept: OTOLARYNGOLOGY | Facility: CLINIC | Age: OVER 89
End: 2025-06-10
Payer: MEDICARE

## 2025-06-10 NOTE — TELEPHONE ENCOUNTER
[facility] Call Center    Phone Message    May a detailed message be left on voicemail: yes     Reason for Call: Appointment Intake    Referring Provider Name:     DELFINO WINIFRED     Diagnosis and/or Symptoms:     Pleomorphic adenoma of parotid gland       Growing left parotid gland tumor-pleomorphic adenoma , seen at [place] second opinion.        TE to clinic for scheduling    Action Taken: Message routed to:  [facility] ENT    Travel Screening: Not Applicable     Date of Service:

## 2025-06-11 NOTE — TELEPHONE ENCOUNTER
Left Voicemail (1st Attempt) for the patient to call back and schedule the following:    Appointment Type: New ENT Tumor P1  Provider: Dr. Vicente Oneill, Dr. Jas Chatterjee, Dr. Audi Erickson, and Dr. Dia Andrade   Appt date: next open  Specialty phone number: [phone]   Additional appointment(s) needed:   Additional Notes: Pleomorphic adenoma of parotid gland - Referred by Apolonia Clark PA-C in [facility]

## 2025-06-12 ENCOUNTER — LAB (OUTPATIENT)
Dept: LAB | Facility: CLINIC | Age: OVER 89
End: 2025-06-12
Attending: INTERNAL MEDICINE
Payer: MEDICARE

## 2025-06-12 NOTE — NURSING NOTE
Pt here for lab draw, q3week labs drawn 6/9, confirmed with provider no labs needed today, pt and family informed.     Eloina Bonoe RN

## 2025-06-17 ENCOUNTER — THERAPY VISIT (OUTPATIENT)
Dept: PHYSICAL THERAPY | Facility: CLINIC | Age: OVER 89
End: 2025-06-17
Payer: MEDICARE

## 2025-06-17 DIAGNOSIS — C66.2 CANCER OF LEFT URETER (H): Primary | ICD-10-CM

## 2025-06-17 DIAGNOSIS — R26.89 IMPAIRED GAIT AND MOBILITY: ICD-10-CM

## 2025-06-17 DIAGNOSIS — R53.81 PHYSICAL DECONDITIONING: ICD-10-CM

## 2025-06-17 DIAGNOSIS — C77.0 METASTASIS TO CERVICAL LYMPH NODE (H): ICD-10-CM

## 2025-06-17 DIAGNOSIS — R68.89 DECREASED FUNCTIONAL ACTIVITY TOLERANCE: ICD-10-CM

## 2025-06-17 PROCEDURE — 97110 THERAPEUTIC EXERCISES: CPT | Mod: GP | Performed by: PHYSICAL THERAPIST

## 2025-06-24 ENCOUNTER — THERAPY VISIT (OUTPATIENT)
Dept: PHYSICAL THERAPY | Facility: CLINIC | Age: OVER 89
End: 2025-06-24
Payer: MEDICARE

## 2025-06-24 DIAGNOSIS — C66.2 CANCER OF LEFT URETER (H): Primary | ICD-10-CM

## 2025-06-24 DIAGNOSIS — R53.81 PHYSICAL DECONDITIONING: ICD-10-CM

## 2025-06-24 DIAGNOSIS — R26.89 IMPAIRED GAIT AND MOBILITY: ICD-10-CM

## 2025-06-24 DIAGNOSIS — C77.0 METASTASIS TO CERVICAL LYMPH NODE (H): ICD-10-CM

## 2025-06-24 DIAGNOSIS — R68.89 DECREASED FUNCTIONAL ACTIVITY TOLERANCE: ICD-10-CM

## 2025-06-24 PROCEDURE — 97110 THERAPEUTIC EXERCISES: CPT | Mod: GP | Performed by: PHYSICAL THERAPIST

## 2025-07-02 ENCOUNTER — INFUSION THERAPY VISIT (OUTPATIENT)
Dept: ONCOLOGY | Facility: CLINIC | Age: OVER 89
End: 2025-07-02
Attending: INTERNAL MEDICINE
Payer: MEDICARE

## 2025-07-02 ENCOUNTER — APPOINTMENT (OUTPATIENT)
Dept: LAB | Facility: CLINIC | Age: OVER 89
End: 2025-07-02
Attending: INTERNAL MEDICINE
Payer: MEDICARE

## 2025-07-02 VITALS
TEMPERATURE: 97.7 F | RESPIRATION RATE: 18 BRPM | OXYGEN SATURATION: 96 % | WEIGHT: 197.6 LBS | DIASTOLIC BLOOD PRESSURE: 73 MMHG | SYSTOLIC BLOOD PRESSURE: 128 MMHG | BODY MASS INDEX: 30.04 KG/M2 | HEART RATE: 75 BPM

## 2025-07-02 DIAGNOSIS — C66.2 CANCER OF LEFT URETER (H): Primary | ICD-10-CM

## 2025-07-02 DIAGNOSIS — C77.0 METASTASIS TO CERVICAL LYMPH NODE (H): ICD-10-CM

## 2025-07-02 DIAGNOSIS — Z79.899 HIGH RISK MEDICATION USE: ICD-10-CM

## 2025-07-02 LAB
ALBUMIN SERPL BCG-MCNC: 4.4 G/DL (ref 3.5–5.2)
ALP SERPL-CCNC: 102 U/L (ref 40–150)
ALT SERPL W P-5'-P-CCNC: 9 U/L (ref 0–70)
AMYLASE SERPL-CCNC: 68 U/L (ref 28–100)
ANION GAP SERPL CALCULATED.3IONS-SCNC: 12 MMOL/L (ref 7–15)
AST SERPL W P-5'-P-CCNC: 17 U/L (ref 0–45)
BASOPHILS # BLD AUTO: 0.1 10E3/UL (ref 0–0.2)
BASOPHILS NFR BLD AUTO: 1 %
BILIRUB SERPL-MCNC: 0.3 MG/DL
BUN SERPL-MCNC: 31.4 MG/DL (ref 8–23)
CALCIUM SERPL-MCNC: 9.7 MG/DL (ref 8.8–10.4)
CHLORIDE SERPL-SCNC: 107 MMOL/L (ref 98–107)
CORTIS SERPL-MCNC: 8.7 UG/DL
CREAT SERPL-MCNC: 1.23 MG/DL (ref 0.67–1.17)
EGFRCR SERPLBLD CKD-EPI 2021: 55 ML/MIN/1.73M2
EOSINOPHIL # BLD AUTO: 0.3 10E3/UL (ref 0–0.7)
EOSINOPHIL NFR BLD AUTO: 4 %
ERYTHROCYTE [DISTWIDTH] IN BLOOD BY AUTOMATED COUNT: 13.7 % (ref 10–15)
GLUCOSE SERPL-MCNC: 104 MG/DL (ref 70–99)
HCO3 SERPL-SCNC: 23 MMOL/L (ref 22–29)
HCT VFR BLD AUTO: 34.8 % (ref 40–53)
HGB BLD-MCNC: 11 G/DL (ref 13.3–17.7)
IMM GRANULOCYTES # BLD: 0 10E3/UL
IMM GRANULOCYTES NFR BLD: 1 %
LIPASE SERPL-CCNC: 33 U/L (ref 13–60)
LYMPHOCYTES # BLD AUTO: 0.9 10E3/UL (ref 0.8–5.3)
LYMPHOCYTES NFR BLD AUTO: 13 %
MCH RBC QN AUTO: 30.8 PG (ref 26.5–33)
MCHC RBC AUTO-ENTMCNC: 31.6 G/DL (ref 31.5–36.5)
MCV RBC AUTO: 98 FL (ref 78–100)
MONOCYTES # BLD AUTO: 0.5 10E3/UL (ref 0–1.3)
MONOCYTES NFR BLD AUTO: 8 %
NEUTROPHILS # BLD AUTO: 5 10E3/UL (ref 1.6–8.3)
NEUTROPHILS NFR BLD AUTO: 74 %
NRBC # BLD AUTO: 0 10E3/UL
NRBC BLD AUTO-RTO: 0 /100
PLATELET # BLD AUTO: 186 10E3/UL (ref 150–450)
POTASSIUM SERPL-SCNC: 4.7 MMOL/L (ref 3.4–5.3)
PROT SERPL-MCNC: 6.5 G/DL (ref 6.4–8.3)
RBC # BLD AUTO: 3.57 10E6/UL (ref 4.4–5.9)
SODIUM SERPL-SCNC: 142 MMOL/L (ref 135–145)
T4 FREE SERPL-MCNC: 1.19 NG/DL (ref 0.9–1.7)
TSH SERPL DL<=0.005 MIU/L-ACNC: 0.9 UIU/ML (ref 0.3–4.2)
WBC # BLD AUTO: 6.7 10E3/UL (ref 4–11)

## 2025-07-02 PROCEDURE — 82150 ASSAY OF AMYLASE: CPT

## 2025-07-02 PROCEDURE — 36415 COLL VENOUS BLD VENIPUNCTURE: CPT

## 2025-07-02 PROCEDURE — 84443 ASSAY THYROID STIM HORMONE: CPT

## 2025-07-02 PROCEDURE — 84439 ASSAY OF FREE THYROXINE: CPT

## 2025-07-02 PROCEDURE — 82040 ASSAY OF SERUM ALBUMIN: CPT | Performed by: INTERNAL MEDICINE

## 2025-07-02 PROCEDURE — 99214 OFFICE O/P EST MOD 30 MIN: CPT

## 2025-07-02 PROCEDURE — 85004 AUTOMATED DIFF WBC COUNT: CPT

## 2025-07-02 PROCEDURE — 82533 TOTAL CORTISOL: CPT

## 2025-07-02 PROCEDURE — G0463 HOSPITAL OUTPT CLINIC VISIT: HCPCS

## 2025-07-02 PROCEDURE — 250N000011 HC RX IP 250 OP 636: Mod: JZ

## 2025-07-02 PROCEDURE — G2211 COMPLEX E/M VISIT ADD ON: HCPCS

## 2025-07-02 PROCEDURE — 83690 ASSAY OF LIPASE: CPT

## 2025-07-02 PROCEDURE — 258N000003 HC RX IP 258 OP 636

## 2025-07-02 RX ORDER — METHYLPREDNISOLONE SODIUM SUCCINATE 40 MG/ML
40 INJECTION INTRAMUSCULAR; INTRAVENOUS
Status: CANCELLED
Start: 2025-07-03

## 2025-07-02 RX ORDER — DIPHENHYDRAMINE HYDROCHLORIDE 50 MG/ML
50 INJECTION, SOLUTION INTRAMUSCULAR; INTRAVENOUS
Status: CANCELLED
Start: 2025-07-03

## 2025-07-02 RX ORDER — EPINEPHRINE 1 MG/ML
0.3 INJECTION, SOLUTION INTRAMUSCULAR; SUBCUTANEOUS EVERY 5 MIN PRN
Status: CANCELLED | OUTPATIENT
Start: 2025-07-03

## 2025-07-02 RX ORDER — LORAZEPAM 2 MG/ML
0.5 INJECTION INTRAMUSCULAR EVERY 4 HOURS PRN
Status: CANCELLED | OUTPATIENT
Start: 2025-07-03

## 2025-07-02 RX ORDER — HEPARIN SODIUM (PORCINE) LOCK FLUSH IV SOLN 100 UNIT/ML 100 UNIT/ML
5 SOLUTION INTRAVENOUS
Status: CANCELLED | OUTPATIENT
Start: 2025-07-03

## 2025-07-02 RX ORDER — ALBUTEROL SULFATE 90 UG/1
1-2 INHALANT RESPIRATORY (INHALATION)
Status: CANCELLED
Start: 2025-07-03

## 2025-07-02 RX ORDER — MEPERIDINE HYDROCHLORIDE 25 MG/ML
25 INJECTION INTRAMUSCULAR; INTRAVENOUS; SUBCUTANEOUS
Status: CANCELLED | OUTPATIENT
Start: 2025-07-03

## 2025-07-02 RX ORDER — DIPHENHYDRAMINE HYDROCHLORIDE 50 MG/ML
25 INJECTION, SOLUTION INTRAMUSCULAR; INTRAVENOUS
Status: CANCELLED
Start: 2025-07-03

## 2025-07-02 RX ORDER — HEPARIN SODIUM,PORCINE 10 UNIT/ML
5-20 VIAL (ML) INTRAVENOUS DAILY PRN
Status: CANCELLED | OUTPATIENT
Start: 2025-07-03

## 2025-07-02 RX ORDER — ALBUTEROL SULFATE 0.83 MG/ML
2.5 SOLUTION RESPIRATORY (INHALATION)
Status: CANCELLED | OUTPATIENT
Start: 2025-07-03

## 2025-07-02 RX ADMIN — SODIUM CHLORIDE 250 ML: 0.9 INJECTION, SOLUTION INTRAVENOUS at 16:03

## 2025-07-02 RX ADMIN — SODIUM CHLORIDE 200 MG: 9 INJECTION, SOLUTION INTRAVENOUS at 16:05

## 2025-07-02 ASSESSMENT — PAIN SCALES - GENERAL: PAINLEVEL_OUTOF10: MILD PAIN (1)

## 2025-07-02 NOTE — NURSING NOTE
Chief Complaint   Patient presents with    Blood Draw     Labs drawn via PIV placed by RN. VS taken.     Labs drawn from PIV placed by RN. Line flushed with saline. Vitals taken. Pt checked in for appointment(s).     Candida Cardona RN

## 2025-07-02 NOTE — PROGRESS NOTES
Infusion Nursing Note:  Shailesh Hartman presents today for Cycle 3, Day 1- Keytruda Infusion.    Patient seen by provider today: Yes: Herminia Diaz NP   present during visit today: Not Applicable.    Note: Patient reports feeling well on arrival to infusion suite today. Denies signs of infection. No new concerns, questions, or changes following NAHOMY appointment. Consents to treatment today.       Intravenous Access:  Peripheral IV placed.    Treatment Conditions:  Lab Results   Component Value Date    HGB 11.0 (L) 07/02/2025    WBC 6.7 07/02/2025    ANEU 5.0 07/02/2025     07/02/2025        Lab Results   Component Value Date     07/02/2025    POTASSIUM 4.7 07/02/2025    CR 1.23 (H) 07/02/2025    NAS 9.7 07/02/2025    BILITOTAL 0.3 07/02/2025    ALBUMIN 4.4 07/02/2025    ALT 9 07/02/2025    AST 17 07/02/2025       Results reviewed, labs MET treatment parameters, ok to proceed with treatment.      Post Infusion Assessment:  Patient tolerated infusion without incident.  Blood return noted pre and post infusion.  Site patent and intact, free from redness, edema or discomfort.  No evidence of extravasations.  Access discontinued per protocol.       Discharge Plan:   Patient declined prescription refills.  Discharge instructions reviewed with: Patient.  Patient and/or family verbalized understanding of discharge instructions and all questions answered.  AVS to patient via [facility].  Patient will return 7/24/25 for next appointment.   Patient discharged in stable condition accompanied by: wife.  Departure Mode: Ambulatory with walking sticks.      Shawanda Koehler RN

## 2025-07-02 NOTE — NURSING NOTE
"Oncology Rooming Note    July 2, 2025 2:34 PM   Shailesh Hartman is a 89 year old male who presents for:    Chief Complaint   Patient presents with    Blood Draw     Labs drawn via PIV placed by RN. VS taken.    Oncology Clinic Visit     Urothelial cancer     Initial Vitals: /73 (BP Location: Right arm, Patient Position: Sitting, Cuff Size: Adult Large)   Pulse 75   Temp 97.7  F (36.5  C) (Oral)   Resp 18   Wt 89.6 kg (197 lb 9.6 oz)   SpO2 96%   BMI 30.04 kg/m   Estimated body mass index is 30.04 kg/m  as calculated from the following:    Height as of 6/2/25: 1.727 m (5' 8\").    Weight as of this encounter: 89.6 kg (197 lb 9.6 oz). Body surface area is 2.07 meters squared.  Mild Pain (1) Comment: Data Unavailable   No LMP for male patient.  Allergies reviewed: Yes  Medications reviewed: Yes    Medications: Medication refills not needed today.  Pharmacy name entered into Corthera:    [id number] - [place] - [address] AT [facility] OF [address]  [id number] - [place], [place] - [address] [facility]    Frailty Screening:   Is the patient here for a new oncology consult visit in cancer care? 2. No    PHQ9:  Did this patient require a PHQ9?: No      Clinical concerns: none.       Natalie Vieira"

## 2025-07-02 NOTE — PROGRESS NOTES
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  Patient presents with urinary retention and left hydroureteronephrosis.  CT abdomen, pelvis 02/03/2025, revealed moderate left hydroureteronephrosis with ureter dilation to the level of the distal ureter and a 6.2 cm segment of abnormal diffuse urothelial thickening and enhancement in the distal left ureter.  There was mild prostatomegaly with prostate fiducial markers in place.  Urine cytology 02/25/2025, revealed atypical cells.  Patient underwent cystoscopy ureteroscopy, retrograde left pyelogram, left ureteral biopsy and left ureteral stent placement 02/25/2025, that revealed fibrotic stromal tissue with close side effect and chronic inflammation without evidence of malignancy.  11-C choline PET/CT scan performed for surveillance for previously diagnosed prostate cancer 12/12/2024, revealed stable diffuse choline uptake in the prostate with SUV max 4.  There was a 1.2 cm moderately choline-avid level 2 left cervical lymph node.  There is no other choline-avid lymphadenopathy.  CT neck 12/31/2024, revealed a 1.2 x 1.6 x 1.7 cm level 2A left cervical lymph node corresponding to the choline-avid lymph node on prior 11-C choline PET/CT scan.  There was a 2 x 1.3 x 1.8 cm well-circumscribed enhancing homogeneous mass in the left parotid that was not choline-avid on the prior PET/CT scan. Repeat 11-C choline PET/CT scan 03/10/2025, revealed faint choline uptake throughout the atrophic prostate that was unchanged.  There was an intensely choline-avid level 2A left cervical lymph node.  There was also an enhancing mass in the left parotid.  There are no other choline-avid lesions. Ultrasound-guided left cervical  lymph node core needle biopsy 04/16/2025, revealed malignant epithelioid cells with focal areas of necrosis positive for CK AE1/AE3, GATA3, and p63 (weak), but negative for PSA and NKX3.1 consistent with urothelial carcinoma.  Pathology review at the [facility] revealed metastatic carcinoma with morphology and immunohistochemistry suggesting primary carcinoma from the salivary gland, breast, or urothelium depending on clinical and imaging correlation. 18-F FDG PET/CT scan 05/07/2025, revealed a 1.6 x 1.3 cm level 2A left cervical lymph node with SUV max 16.12 that was a previously biopsied metastatic urothelial carcinoma. There was a 1.6 x 1.5 cm right submandibular lymph node with SUV max 3.82.  There was a 2.1 x 1.3 cm enhancing anterior left parotid lymph node with SUV max 3.99 that was a previously biopsied pleomorphic adenoma.  There was mild left pelvocaliectasis with diffuse pelvicalyceal and ureteral FDG uptake favoring FDG tracer excretion through the ureteral stent, with a focus of hypermetabolism in the distal left ureter proximal to the ureteral stenosis that was indeterminate but in the location of the presumed primary lesion.  There was focal intense FDG uptake near the right posterior apex peripheral zone of the prostate with SUV max 5.3, without CT correlate.     Interval History:     Eczema  Urinary -   catheter, no concerns today, working with urology as needed   Parotid adenoma - shrunk  Tired -     Not getting as much exercise, feeling more fatigued   -working with cancer rehab PT             Shailesh is seen in clinic June 9, 2025 prior to his second cycle of pembrolizumab. He is accompanied by his wife Sofia. He is feeling stable. His energy is stable. No chest pain, shortness of breath, or cough. He has a parotid gland pleomorphic adenoma that he saw ENT at [facility] for. They had discussed surgical excision. He'd like a referral for a second opinion with ENT with our team. He  has no diarrhea or constipation. He has eczema at baseline and applies lotion with oatmeal colloid with benefit. No new or changes in baseline eczema since starting keytruda. No headache or vision changes. No new pains reported. He has weakness and is doing PT for this. He has a catheter in place due to urinary return and ureteral stents which was most recently replaced on 06/02/2025. No new arthralgias reported.    Remainder of 12 point ROS reviewed and negative except as in interval history.       MEDICATIONS:   Current Outpatient Medications   Medication Sig Dispense Refill    acetaminophen (TYLENOL) 500 MG tablet Take 500-1,000 mg by mouth 3 times daily.      allopurinol (ZYLOPRIM) 300 MG tablet Take 300 mg by mouth daily      Calcium Carb-Cholecalciferol 500-10 MG-MCG TABS Take 1 tablet by mouth daily.      ketoconazole (NIZORAL) 2 % external shampoo Apply to the scalp for 5 minutes in the shower three times a week 120 mL 3    lisinopril (PRINIVIL,ZESTRIL) 20 MG tablet Take 20 mg by mouth 2 times daily      melatonin 3 MG tablet Take 1.5 mg by mouth nightly as needed for sleep.      Multiple Vitamin (MULTI-VITAMINS) TABS 1 tab by mouth daily      potassium chloride ER (K-TAB/KLOR-CON) 10 MEQ CR tablet TAKES 10 MEQ BID  1    simvastatin (ZOCOR) 20 MG tablet Take 1 tablet by mouth At Bedtime      tamsulosin (FLOMAX) 0.4 MG capsule Take 0.4 mg by mouth daily.      triamcinolone (KENALOG) 0.1 % external ointment Apply topically 2 times daily. Apply to the right lower leg and all other itchy pink spots on the body 80 g 3    triamterene-HCTZ (DYAZIDE) 37.5-25 MG capsule TK 1 C PO D  3    Vitamin D-Vitamin K (DECARA K) 1250-200 MCG CAPS              IMPRESSION/PLAN:   #Stage IV urothelial carcinoma of the distal left ureter.  There is a a 6.2 cm segment of abnormal diffuse urothelial thickening and enhancement in the distal left ureter noted on CT scan (02/03/2025) with metastases to left cervical lymph node detected  on FDG PET/CT scan (05/07/2025) and left cervical lymph node biopsy (04/16/2025).  There is left hydroureteronephrosis requiring left ureteral stent placement (02/25/2025) and patient also has a chronic indwelling Barron for prior urinary retention.   - receiving therapy with first line pembrolizumab 200 mg every 3 weeks. He tolerated cycle 1 very well. Labs June 9, 2025 are within acceptable limits to proceed.       #ureteral stent  #urinary retention   - managed by Dr. Ray. Last stent replacement 06/2/25. Next scheduled for 10/6/25.       #Parotid gland nodule   - biopsied at [facility] consistent with a pleomorphic adenoma. Patient would like a second opinion with our ENT team. Referral placed.       #Eczema   - continue topical oatmeal lotion. Has seen Dr. Avalos       The longitudinal plan of care for the diagnosis(es)/condition(s) as documented were addressed during this visit. Due to the added complexity in care, I will continue to support Shailesh in the subsequent management and with ongoing continuity of care.      *** minutes spent on the date of the encounter doing chart review, review of test results, interpretation of tests, patient visit, and documentation

## 2025-07-02 NOTE — Clinical Note
7/2/2025      Shailesh Hartman  [address] [facility] [address]      Dear Colleague,    Thank you for referring your patient, Shailesh Hartman, to the [facility]. Please see a copy of my visit note below.    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  Patient presents with urinary retention and left hydroureteronephrosis.  CT abdomen, pelvis 02/03/2025, revealed moderate left hydroureteronephrosis with ureter dilation to the level of the distal ureter and a 6.2 cm segment of abnormal diffuse urothelial thickening and enhancement in the distal left ureter.  There was mild prostatomegaly with prostate fiducial markers in place.  Urine cytology 02/25/2025, revealed atypical cells.  Patient underwent cystoscopy ureteroscopy, retrograde left pyelogram, left ureteral biopsy and left ureteral stent placement 02/25/2025, that revealed fibrotic stromal tissue with close side effect and chronic inflammation without evidence of malignancy.  11-C choline PET/CT scan performed for surveillance for previously diagnosed prostate cancer 12/12/2024, revealed stable diffuse choline uptake in the prostate with SUV max 4.  There was a 1.2 cm moderately choline-avid level 2 left cervical lymph node.  There is no other choline-avid lymphadenopathy.  CT neck 12/31/2024, revealed a 1.2 x 1.6 x 1.7 cm level 2A left cervical lymph node corresponding to the choline-avid lymph node on prior 11-C choline PET/CT scan.  There was a 2 x 1.3 x 1.8 cm well-circumscribed enhancing homogeneous mass in the left parotid that was not choline-avid on the prior PET/CT scan. Repeat 11-C choline PET/CT scan 03/10/2025, revealed faint choline  uptake throughout the atrophic prostate that was unchanged.  There was an intensely choline-avid level 2A left cervical lymph node.  There was also an enhancing mass in the left parotid.  There are no other choline-avid lesions. Ultrasound-guided left cervical lymph node core needle biopsy 04/16/2025, revealed malignant epithelioid cells with focal areas of necrosis positive for CK AE1/AE3, GATA3, and p63 (weak), but negative for PSA and NKX3.1 consistent with urothelial carcinoma.  Pathology review at the [facility] revealed metastatic carcinoma with morphology and immunohistochemistry suggesting primary carcinoma from the salivary gland, breast, or urothelium depending on clinical and imaging correlation. 18-F FDG PET/CT scan 05/07/2025, revealed a 1.6 x 1.3 cm level 2A left cervical lymph node with SUV max 16.12 that was a previously biopsied metastatic urothelial carcinoma. There was a 1.6 x 1.5 cm right submandibular lymph node with SUV max 3.82.  There was a 2.1 x 1.3 cm enhancing anterior left parotid lymph node with SUV max 3.99 that was a previously biopsied pleomorphic adenoma.  There was mild left pelvocaliectasis with diffuse pelvicalyceal and ureteral FDG uptake favoring FDG tracer excretion through the ureteral stent, with a focus of hypermetabolism in the distal left ureter proximal to the ureteral stenosis that was indeterminate but in the location of the presumed primary lesion.  There was focal intense FDG uptake near the right posterior apex peripheral zone of the prostate with SUV max 5.3, without CT correlate.     Interval History:     Eczema  Urinary   Parotid adenoma Sinai Cash is seen in clinic June 9, 2025 prior to his second cycle of pembrolizumab. He is accompanied by his wife Sofia. He is feeling stable. His energy is stable. No chest pain, shortness of breath, or cough. He has a parotid gland pleomorphic adenoma that he saw ENT at [facility] for. They had  discussed surgical excision. He'd like a referral for a second opinion with ENT with our team. He has no diarrhea or constipation. He has eczema at baseline and applies lotion with oatmeal colloid with benefit. No new or changes in baseline eczema since starting keytruda. No headache or vision changes. No new pains reported. He has weakness and is doing PT for this. He has a catheter in place due to urinary return and ureteral stents which was most recently replaced on 06/02/2025. No new arthralgias reported.    Remainder of 12 point ROS reviewed and negative except as in interval history.       MEDICATIONS:   Current Outpatient Medications   Medication Sig Dispense Refill    acetaminophen (TYLENOL) 500 MG tablet Take 500-1,000 mg by mouth 3 times daily.      allopurinol (ZYLOPRIM) 300 MG tablet Take 300 mg by mouth daily      Calcium Carb-Cholecalciferol 500-10 MG-MCG TABS Take 1 tablet by mouth daily.      ketoconazole (NIZORAL) 2 % external shampoo Apply to the scalp for 5 minutes in the shower three times a week 120 mL 3    lisinopril (PRINIVIL,ZESTRIL) 20 MG tablet Take 20 mg by mouth 2 times daily      melatonin 3 MG tablet Take 1.5 mg by mouth nightly as needed for sleep.      Multiple Vitamin (MULTI-VITAMINS) TABS 1 tab by mouth daily      potassium chloride ER (K-TAB/KLOR-CON) 10 MEQ CR tablet TAKES 10 MEQ BID  1    simvastatin (ZOCOR) 20 MG tablet Take 1 tablet by mouth At Bedtime      tamsulosin (FLOMAX) 0.4 MG capsule Take 0.4 mg by mouth daily.      triamcinolone (KENALOG) 0.1 % external ointment Apply topically 2 times daily. Apply to the right lower leg and all other itchy pink spots on the body 80 g 3    triamterene-HCTZ (DYAZIDE) 37.5-25 MG capsule TK 1 C PO D  3    Vitamin D-Vitamin K (DECARA K) 1250-200 MCG CAPS              IMPRESSION/PLAN:   #Stage IV urothelial carcinoma of the distal left ureter.  There is a a 6.2 cm segment of abnormal diffuse urothelial thickening and enhancement in the  distal left ureter noted on CT scan (02/03/2025) with metastases to left cervical lymph node detected on FDG PET/CT scan (05/07/2025) and left cervical lymph node biopsy (04/16/2025).  There is left hydroureteronephrosis requiring left ureteral stent placement (02/25/2025) and patient also has a chronic indwelling Barron for prior urinary retention.   - receiving therapy with first line pembrolizumab 200 mg every 3 weeks. He tolerated cycle 1 very well. Labs June 9, 2025 are within acceptable limits to proceed.     #ureteral stent  #urinary retention   - managed by Dr. Ray. Last stent replacement 06/2/25. Next scheduled for 10/6/25.     #Parotid gland nodule   - biopsied at [facility] consistent with a pleomorphic adenoma. Patient would like a second opinion with our ENT team. Referral placed.     #Eczema   - continue topical oatmeal lotion. Has seen Dr. Avalos       The longitudinal plan of care for the diagnosis(es)/condition(s) as documented were addressed during this visit. Due to the added complexity in care, I will continue to support Shailesh in the subsequent management and with ongoing continuity of care.      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  Patient presents with urinary retention and left hydroureteronephrosis.  CT abdomen, pelvis 02/03/2025, revealed moderate left hydroureteronephrosis with ureter dilation to the level of the distal ureter and a 6.2 cm segment of abnormal diffuse urothelial thickening and enhancement in the distal left ureter.  There was mild  prostatomegaly with prostate fiducial markers in place.  Urine cytology 02/25/2025, revealed atypical cells.  Patient underwent cystoscopy ureteroscopy, retrograde left pyelogram, left ureteral biopsy and left ureteral stent placement 02/25/2025, that revealed fibrotic stromal tissue with close side effect and chronic inflammation without evidence of malignancy.  11-C choline PET/CT scan performed for surveillance for previously diagnosed prostate cancer 12/12/2024, revealed stable diffuse choline uptake in the prostate with SUV max 4.  There was a 1.2 cm moderately choline-avid level 2 left cervical lymph node.  There is no other choline-avid lymphadenopathy.  CT neck 12/31/2024, revealed a 1.2 x 1.6 x 1.7 cm level 2A left cervical lymph node corresponding to the choline-avid lymph node on prior 11-C choline PET/CT scan.  There was a 2 x 1.3 x 1.8 cm well-circumscribed enhancing homogeneous mass in the left parotid that was not choline-avid on the prior PET/CT scan. Repeat 11-C choline PET/CT scan 03/10/2025, revealed faint choline uptake throughout the atrophic prostate that was unchanged.  There was an intensely choline-avid level 2A left cervical lymph node.  There was also an enhancing mass in the left parotid.  There are no other choline-avid lesions. Ultrasound-guided left cervical lymph node core needle biopsy 04/16/2025, revealed malignant epithelioid cells with focal areas of necrosis positive for CK AE1/AE3, GATA3, and p63 (weak), but negative for PSA and NKX3.1 consistent with urothelial carcinoma.  Pathology review at the [facility] revealed metastatic carcinoma with morphology and immunohistochemistry suggesting primary carcinoma from the salivary gland, breast, or urothelium depending on clinical and imaging correlation. 18-F FDG PET/CT scan 05/07/2025, revealed a 1.6 x 1.3 cm level 2A left cervical lymph node with SUV max 16.12 that was a previously biopsied metastatic urothelial  carcinoma. There was a 1.6 x 1.5 cm right submandibular lymph node with SUV max 3.82.  There was a 2.1 x 1.3 cm enhancing anterior left parotid lymph node with SUV max 3.99 that was a previously biopsied pleomorphic adenoma.  There was mild left pelvocaliectasis with diffuse pelvicalyceal and ureteral FDG uptake favoring FDG tracer excretion through the ureteral stent, with a focus of hypermetabolism in the distal left ureter proximal to the ureteral stenosis that was indeterminate but in the location of the presumed primary lesion.  There was focal intense FDG uptake near the right posterior apex peripheral zone of the prostate with SUV max 5.3, without CT correlate.     Interval History:     Eczema  Urinary -   catheter, no concerns today, working with urology as needed   Parotid adenoma - shrjeanna  Tirlacey Cash is seen in clinic June 9, 2025 prior to his second cycle of pembrolizumab. He is accompanied by his wife Sofia. He is feeling stable. His energy is stable. No chest pain, shortness of breath, or cough. He has a parotid gland pleomorphic adenoma that he saw ENT at [facility] for. They had discussed surgical excision. He'd like a referral for a second opinion with ENT with our team. He has no diarrhea or constipation. He has eczema at baseline and applies lotion with oatmeal colloid with benefit. No new or changes in baseline eczema since starting keytruda. No headache or vision changes. No new pains reported. He has weakness and is doing PT for this. He has a catheter in place due to urinary return and ureteral stents which was most recently replaced on 06/02/2025. No new arthralgias reported.    Remainder of 12 point ROS reviewed and negative except as in interval history.       MEDICATIONS:   Current Outpatient Medications   Medication Sig Dispense Refill     acetaminophen (TYLENOL) 500 MG tablet Take 500-1,000 mg by mouth 3 times daily.       allopurinol (ZYLOPRIM) 300 MG tablet Take 300 mg  by mouth daily       Calcium Carb-Cholecalciferol 500-10 MG-MCG TABS Take 1 tablet by mouth daily.       ketoconazole (NIZORAL) 2 % external shampoo Apply to the scalp for 5 minutes in the shower three times a week 120 mL 3     lisinopril (PRINIVIL,ZESTRIL) 20 MG tablet Take 20 mg by mouth 2 times daily       melatonin 3 MG tablet Take 1.5 mg by mouth nightly as needed for sleep.       Multiple Vitamin (MULTI-VITAMINS) TABS 1 tab by mouth daily       potassium chloride ER (K-TAB/KLOR-CON) 10 MEQ CR tablet TAKES 10 MEQ BID  1     simvastatin (ZOCOR) 20 MG tablet Take 1 tablet by mouth At Bedtime       tamsulosin (FLOMAX) 0.4 MG capsule Take 0.4 mg by mouth daily.       triamcinolone (KENALOG) 0.1 % external ointment Apply topically 2 times daily. Apply to the right lower leg and all other itchy pink spots on the body 80 g 3     triamterene-HCTZ (DYAZIDE) 37.5-25 MG capsule TK 1 C PO D  3     Vitamin D-Vitamin K (DECARA K) 1250-200 MCG CAPS              IMPRESSION/PLAN:   #Stage IV urothelial carcinoma of the distal left ureter.  There is a a 6.2 cm segment of abnormal diffuse urothelial thickening and enhancement in the distal left ureter noted on CT scan (02/03/2025) with metastases to left cervical lymph node detected on FDG PET/CT scan (05/07/2025) and left cervical lymph node biopsy (04/16/2025).  There is left hydroureteronephrosis requiring left ureteral stent placement (02/25/2025) and patient also has a chronic indwelling Barron for prior urinary retention.   - receiving therapy with first line pembrolizumab 200 mg every 3 weeks. He tolerated cycle 1 very well. Labs June 9, 2025 are within acceptable limits to proceed.       #ureteral stent  #urinary retention   - managed by Dr. Ray. Last stent replacement 06/2/25. Next scheduled for 10/6/25.       #Parotid gland nodule   - biopsied at [facility] consistent with a pleomorphic adenoma. Patient would like a second opinion with our ENT team. Referral  placed.       #Eczema   - continue topical oatmeal lotion. Has seen Dr. Avalos       The longitudinal plan of care for the diagnosis(es)/condition(s) as documented were addressed during this visit. Due to the added complexity in care, I will continue to support Shailesh in the subsequent management and with ongoing continuity of care.      *** minutes spent on the date of the encounter doing chart review, review of test results, interpretation of tests, patient visit, and documentation           Again, thank you for allowing me to participate in the care of your patient.        Sincerely,        NADIR Hendrix CNP    Electronically signed

## 2025-07-02 NOTE — PATIENT INSTRUCTIONS
Contact Numbers    [facility] Main Line/Triage and after hours / weekends / holidays: [phone]      Please call the triage or after hours line if you experience a temperature greater than or equal to 100.5, shaking chills, have uncontrolled nausea, vomiting and/or diarrhea, dizziness, shortness of breath, chest pain, bleeding, unexplained bruising, or if you have any other new/concerning symptoms, questions or concerns.      If you are having any concerning symptoms or wish to speak to a provider before your next infusion visit, please call your care coordinator or triage to notify them so we can adequately serve you.     If you need a refill on a narcotic prescription or other medication, please call before your infusion appointment.

## 2025-07-02 NOTE — PATIENT INSTRUCTIONS
[facility] Triage and after hours / weekends / holidays:  [phone]    Please call the triage or after hours line if you experience a temperature greater than or equal to 100.4, shaking chills, have uncontrolled nausea, vomiting and/or diarrhea, dizziness, shortness of breath, chest pain, bleeding, unexplained bruising, or if you have any other new/concerning symptoms, questions or concerns.      If you are having any concerning symptoms or wish to speak to a provider before your next infusion visit, please call triage to notify them so we can adequately serve you.     If you need a refill on a narcotic prescription or other medication, please call before your infusion appointment.

## 2025-07-03 ENCOUNTER — THERAPY VISIT (OUTPATIENT)
Dept: PHYSICAL THERAPY | Facility: CLINIC | Age: OVER 89
End: 2025-07-03
Payer: MEDICARE

## 2025-07-03 DIAGNOSIS — C77.0 METASTASIS TO CERVICAL LYMPH NODE (H): ICD-10-CM

## 2025-07-03 DIAGNOSIS — R68.89 DECREASED FUNCTIONAL ACTIVITY TOLERANCE: ICD-10-CM

## 2025-07-03 DIAGNOSIS — R53.81 PHYSICAL DECONDITIONING: ICD-10-CM

## 2025-07-03 DIAGNOSIS — R26.89 IMPAIRED GAIT AND MOBILITY: ICD-10-CM

## 2025-07-03 DIAGNOSIS — C66.2 CANCER OF LEFT URETER (H): Primary | ICD-10-CM

## 2025-07-08 ENCOUNTER — THERAPY VISIT (OUTPATIENT)
Dept: PHYSICAL THERAPY | Facility: CLINIC | Age: OVER 89
End: 2025-07-08
Payer: MEDICARE

## 2025-07-08 DIAGNOSIS — R26.89 IMPAIRED GAIT AND MOBILITY: ICD-10-CM

## 2025-07-08 DIAGNOSIS — C66.2 CANCER OF LEFT URETER (H): Primary | ICD-10-CM

## 2025-07-08 DIAGNOSIS — C77.0 METASTASIS TO CERVICAL LYMPH NODE (H): ICD-10-CM

## 2025-07-08 DIAGNOSIS — R68.89 DECREASED FUNCTIONAL ACTIVITY TOLERANCE: ICD-10-CM

## 2025-07-08 DIAGNOSIS — R53.81 PHYSICAL DECONDITIONING: ICD-10-CM

## 2025-07-08 PROCEDURE — 97110 THERAPEUTIC EXERCISES: CPT | Mod: GP | Performed by: PHYSICAL THERAPIST

## 2025-07-18 NOTE — PROGRESS NOTES
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  Patient presents with urinary retention and left hydroureteronephrosis.  CT abdomen, pelvis 02/03/2025, revealed moderate left hydroureteronephrosis with ureter dilation to the level of the distal ureter and a 6.2 cm segment of abnormal diffuse urothelial thickening and enhancement in the distal left ureter.  There was mild prostatomegaly with prostate fiducial markers in place.  Urine cytology 02/25/2025, revealed atypical cells.  Patient underwent cystoscopy ureteroscopy, retrograde left pyelogram, left ureteral biopsy and left ureteral stent placement 02/25/2025, that revealed fibrotic stromal tissue with close side effect and chronic inflammation without evidence of malignancy.  11-C choline PET/CT scan performed for surveillance for previously diagnosed prostate cancer 12/12/2024, revealed stable diffuse choline uptake in the prostate with SUV max 4.  There was a 1.2 cm moderately choline-avid level 2 left cervical lymph node.  There is no other choline-avid lymphadenopathy.  CT neck 12/31/2024, revealed a 1.2 x 1.6 x 1.7 cm level 2A left cervical lymph node corresponding to the choline-avid lymph node on prior 11-C choline PET/CT scan.  There was a 2 x 1.3 x 1.8 cm well-circumscribed enhancing homogeneous mass in the left parotid that was not choline-avid on the prior PET/CT scan. Repeat 11-C choline PET/CT scan 03/10/2025, revealed faint choline uptake throughout the atrophic prostate that was unchanged.  There was an intensely choline-avid level 2A left cervical lymph node.  There was also an enhancing mass in the left parotid.  There are no other choline-avid lesions. Ultrasound-guided left cervical lymph node core needle biopsy  04/16/2025, revealed malignant epithelioid cells with focal areas of necrosis positive for CK AE1/AE3, GATA3, and p63 (weak), but negative for PSA and NKX3.1 consistent with urothelial carcinoma.  Pathology review at the [facility] revealed metastatic carcinoma with morphology and immunohistochemistry suggesting primary carcinoma from the salivary gland, breast, or urothelium depending on clinical and imaging correlation. 18-F FDG PET/CT scan 05/07/2025, revealed a 1.6 x 1.3 cm level 2A left cervical lymph node with SUV max 16.12 that was a previously biopsied metastatic urothelial carcinoma. There was a 1.6 x 1.5 cm right submandibular lymph node with SUV max 3.82.  There was a 2.1 x 1.3 cm enhancing anterior left parotid lymph node with SUV max 3.99 that was a previously biopsied pleomorphic adenoma.  There was mild left pelvocaliectasis with diffuse pelvicalyceal and ureteral FDG uptake favoring FDG tracer excretion through the ureteral stent, with a focus of hypermetabolism in the distal left ureter proximal to the ureteral stenosis that was indeterminate but in the location of the presumed primary lesion.  There was focal intense FDG uptake near the right posterior apex peripheral zone of the prostate with SUV max 5.3, without CT correlate. Patient is chemotherapy ineligible, and enfortumab vedotin is associated with significant adverse effects including peripheral neuropathy that would lead to considerable morbidity.  Patient is receiving first-line pembrolizumab 200 mg IV every 21 days beginning 05/21/2025.  Patient has mild fatigue, and a stable level of decrease in exercise tolerance, muscle weakness particularly in the lower extremities, imbalance, mild decrease in appetite, 50-pound weight loss in the past year, stable eczema, and an indwelling Barron catheter due to urinary retention.  Patient had an episode of chest pressure with exercise that resolved with rest.  Patient uses walking sticks  "for balance and mobility.  He is independent and is able to perform his activities of daily living and some chores.  There are no other new symptoms or events since the prior clinic visit (07/02/2025). He denies fever, headache, cough, dyspnea, chest pain, abdominal pain, nausea, vomiting, constipation, diarrhea, bleeding, or bone pain.     PAST HISTORY: Past history was reviewed and unchanged from the clinic note 05/08/2025.     MEDICATIONS:   Current Outpatient Medications   Medication Sig Dispense Refill    acetaminophen (TYLENOL) 500 MG tablet Take 500-1,000 mg by mouth 3 times daily.      allopurinol (ZYLOPRIM) 300 MG tablet Take 300 mg by mouth daily      Calcium Carb-Cholecalciferol 500-10 MG-MCG TABS Take 1 tablet by mouth daily.      ketoconazole (NIZORAL) 2 % external shampoo Apply to the scalp for 5 minutes in the shower three times a week 120 mL 3    lisinopril (PRINIVIL,ZESTRIL) 20 MG tablet Take 20 mg by mouth 2 times daily      melatonin 3 MG tablet Take 1.5 mg by mouth nightly as needed for sleep.      Multiple Vitamin (MULTI-VITAMINS) TABS 1 tab by mouth daily      potassium chloride ER (K-TAB/KLOR-CON) 10 MEQ CR tablet TAKES 10 MEQ BID  1    simvastatin (ZOCOR) 20 MG tablet Take 1 tablet by mouth At Bedtime      tamsulosin (FLOMAX) 0.4 MG capsule Take 0.4 mg by mouth daily.      triamcinolone (KENALOG) 0.1 % external ointment Apply topically 2 times daily. Apply to the right lower leg and all other itchy pink spots on the body (Patient not taking: Reported on 7/2/2025) 80 g 3    triamterene-HCTZ (DYAZIDE) 37.5-25 MG capsule TK 1 C PO D  3    Vitamin D-Vitamin K (DECARA K) 1250-200 MCG CAPS  (Patient not taking: Reported on 7/2/2025)         REVIEW OF SYSTEMS: Review of systems reviewed with the patient and otherwise negative except for those detailed above.    PHYSICAL EXAM:  /75   Pulse 83   Temp 97.6  F (36.4  C) (Oral)   Resp 16   Ht 1.727 m (5' 7.99\")   Wt 87.9 kg (193 lb 12.8 oz)   " SpO2 98%   BMI 29.47 kg/m  .  ECOG performance status: 2.  Physical exam was unchanged from prior clinic visit 07/02/2025.  Skin: No erythema or rash.  HEENT: Sclera nonicteric. Oropharynx without lesions or ulceration, mucosa pink and moist.  There is a 2 cm nodule in the left parotid gland near the angle of the mandible.  Nodes: No cervical, supraclavicular, axillary, or inguinal adenopathy.  Lungs: No dullness to percussion.  No rales, wheezes, rhonchi.  Heart: Regular rate and rhythm.  Abdomen: Bowel sounds present.  Soft, nontender, no hepatosplenomegaly or mass.  Extremities: No edema.     LABORATORY REVIEWED:  Component      Latest Ref Rng 7/2/2025  1:36 PM 7/24/2025  2:34 PM   WBC      4.0 - 11.0 10e3/uL 6.7  5.3    RBC Count      4.40 - 5.90 10e6/uL 3.57 (L)  3.50 (L)    Hemoglobin      13.3 - 17.7 g/dL 11.0 (L)  10.8 (L)    Hematocrit      40.0 - 53.0 % 34.8 (L)  33.6 (L)    MCV      78 - 100 fL 98  96    MCH      26.5 - 33.0 pg 30.8  30.9    MCHC      31.5 - 36.5 g/dL 31.6  32.1    RDW      10.0 - 15.0 % 13.7  14.0    Platelet Count      150 - 450 10e3/uL 186  180    % Neutrophils      % 74  67    % Lymphocytes      % 13  18    % Monocytes      % 8  8    % Eosinophils      % 4  6    % Basophils      % 1  1    % Immature Granulocytes      % 1  1    NRBC/W      <1 /100 0  0    Absolute Neutrophil      1.6 - 8.3 10e3/uL 5.0  3.6    Absolute Lymphocytes      0.8 - 5.3 10e3/uL 0.9  0.9    Absolute Monocytes      0.0 - 1.3 10e3/uL 0.5  0.4    Absolute Eosinophils      0.0 - 0.7 10e3/uL 0.3  0.3    Absolute Basophils      0.0 - 0.2 10e3/uL 0.1  0.1    Absolute Immature Granulocytes      <=0.4 10e3/uL 0.0  0.0    Absolute NRBCs      10e3/uL 0.0  0.0    Sodium      135 - 145 mmol/L 142  140    Potassium      3.4 - 5.3 mmol/L 4.7  4.5    Carbon Dioxide (CO2)      22 - 29 mmol/L 23  22    Anion Gap      7 - 15 mmol/L 12  12    Urea Nitrogen      8.0 - 23.0 mg/dL 31.4 (H)  31.9 (H)    Creatinine      0.67 - 1.17  mg/dL 1.23 (H)  1.32 (H)    GFR Estimate      >60 mL/min/1.73m2 55 (L)  50 (L)    Calcium      8.8 - 10.4 mg/dL 9.7  9.8    Chloride      98 - 107 mmol/L 107  106    Glucose      70 - 99 mg/dL 104 (H)  102 (H)    Alkaline Phosphatase      40 - 150 U/L 102  106    AST      0 - 45 U/L 17  16    ALT      0 - 70 U/L 9  9    Protein Total      6.4 - 8.3 g/dL 6.5  6.5    Albumin      3.5 - 5.2 g/dL 4.4  4.2    Bilirubin Total      <=1.2 mg/dL 0.3  0.3    Cortisol Serum        ug/dL 8.7     Amylase      28 - 100 U/L  66    Lipase      13 - 60 U/L  32    TSH      0.30 - 4.20 uIU/mL  0.68    T4 Free      0.90 - 1.70 ng/dL  1.16        IMPRESSION/PLAN: Stage IV urothelial carcinoma of the distal left ureter.  There is a a 6.2 cm segment of abnormal diffuse urothelial thickening and enhancement in the distal left ureter noted on CT scan (02/03/2025) with metastases to left cervical lymph node detected on FDG PET/CT scan (05/07/2025) and left cervical lymph node biopsy (04/16/2025).  There is left hydroureteronephrosis requiring left ureteral stent placement (02/25/2025) and patient also has a chronic indwelling Barron for prior urinary retention. First-line therapy typically consists of enfortumab vedotin 1.25 mg/kg IV day 1, 8 and pembrolizumab 200 mg IV day 1 every 21 days in accordance with the EV-302 clinical trial (N Engl J Med 2024;390:875-888).  Patient is elderly with an ECOG 2-3 performance status, and he is chemotherapy ineligible, and enfortumab vedotin is associated with significant adverse effects including peripheral neuropathy that would lead to considerable morbidity.  Patient is receiving first-line pembrolizumab 200 mg IV every 21 days (beginning 05/21/2025) to induce a response and improve progression free survival in accordance with the phase 3 KEYNOTE-361 trial ([employer], 2017; 18 (11):4787-5797).  There is grade 1 fatigue, exercise intolerance, and anemia.  Pembrolizumab will be continued without  modification.  Patient will return to the outpatient infusion center 07/24/2025, for cycle 4 of pembrolizumab. I reviewed the risks and side effects of pembrolizumab with the patient, which include fatigue, weakness, anorexia, weight loss, rash, pruritus, dry eyes, dry mouth, headache, cough, dyspnea, abdominal pain, nausea, vomiting, diarrhea, hypothyroidism, adrenal insufficiency, other endocrine disorders, cardiomyopathy, colitis, nephritis, pancreatitis, myositis, arthritis, neuropathy, cerebritis.  Patient understood the indication and risks and agreed to continue therapy.  Cardiology referral was requested for evaluation of stable angina.  Patient will return to clinic in 3 weeks with CBC, metabolic panel, amylase, lipase, TSH, free T4, cortisol, 18-F FDG PET/CT scan, and for pembrolizumab cycle 5. The current and past history, clinical evaluation, reviewing diagnostic tests with the patient, assessment, complex management of immune checkpoint inhibitor toxicities, and planning occurred over 30 minutes.       Roni Wilson MD    cc: MD Tacho Ingram, MD Perico Jacobs

## 2025-07-22 ENCOUNTER — TELEPHONE (OUTPATIENT)
Dept: DERMATOLOGY | Facility: CLINIC | Age: OVER 89
End: 2025-07-22

## 2025-07-22 ENCOUNTER — OFFICE VISIT (OUTPATIENT)
Dept: DERMATOLOGY | Facility: CLINIC | Age: OVER 89
End: 2025-07-22
Payer: MEDICARE

## 2025-07-22 DIAGNOSIS — L21.9 DERMATITIS, SEBORRHEIC: Primary | ICD-10-CM

## 2025-07-22 DIAGNOSIS — L30.0 NUMMULAR ECZEMA: ICD-10-CM

## 2025-07-22 DIAGNOSIS — L73.9 FOLLICULITIS: ICD-10-CM

## 2025-07-22 PROCEDURE — 1126F AMNT PAIN NOTED NONE PRSNT: CPT | Performed by: DERMATOLOGY

## 2025-07-22 PROCEDURE — 99214 OFFICE O/P EST MOD 30 MIN: CPT | Performed by: DERMATOLOGY

## 2025-07-22 RX ORDER — KETOCONAZOLE 20 MG/ML
SHAMPOO, SUSPENSION TOPICAL DAILY PRN
Qty: 120 ML | Refills: 11 | Status: SHIPPED | OUTPATIENT
Start: 2025-07-22

## 2025-07-22 ASSESSMENT — PAIN SCALES - GENERAL: PAINLEVEL_OUTOF10: NO PAIN (0)

## 2025-07-22 NOTE — TELEPHONE ENCOUNTER
Spoke with pt and scheduled 6 month follow up with Dr. Hutton on 1/27 at 10:40am.    ARACELI Aranda

## 2025-07-23 ENCOUNTER — THERAPY VISIT (OUTPATIENT)
Dept: PHYSICAL THERAPY | Facility: CLINIC | Age: OVER 89
End: 2025-07-23
Payer: MEDICARE

## 2025-07-23 ENCOUNTER — ALLIED HEALTH/NURSE VISIT (OUTPATIENT)
Dept: UROLOGY | Facility: CLINIC | Age: OVER 89
End: 2025-07-23
Payer: MEDICARE

## 2025-07-23 DIAGNOSIS — R53.81 PHYSICAL DECONDITIONING: ICD-10-CM

## 2025-07-23 DIAGNOSIS — R26.89 IMPAIRED GAIT AND MOBILITY: ICD-10-CM

## 2025-07-23 DIAGNOSIS — R68.89 DECREASED FUNCTIONAL ACTIVITY TOLERANCE: ICD-10-CM

## 2025-07-23 DIAGNOSIS — M54.6 RIGHT-SIDED THORACIC BACK PAIN, UNSPECIFIED CHRONICITY: ICD-10-CM

## 2025-07-23 DIAGNOSIS — C77.0 METASTASIS TO CERVICAL LYMPH NODE (H): ICD-10-CM

## 2025-07-23 DIAGNOSIS — Z46.6 URINARY CATHETER (FOLEY) CHANGE REQUIRED: Primary | ICD-10-CM

## 2025-07-23 DIAGNOSIS — C66.2 CANCER OF LEFT URETER (H): Primary | ICD-10-CM

## 2025-07-23 PROCEDURE — 97110 THERAPEUTIC EXERCISES: CPT | Mod: GP | Performed by: PHYSICAL THERAPIST

## 2025-07-23 RX ORDER — SULFAMETHOXAZOLE AND TRIMETHOPRIM 800; 160 MG/1; MG/1
1 TABLET ORAL ONCE
Status: COMPLETED | OUTPATIENT
Start: 2025-07-23 | End: 2025-07-23

## 2025-07-23 RX ADMIN — SULFAMETHOXAZOLE AND TRIMETHOPRIM 1 TABLET: 800; 160 TABLET ORAL at 16:00

## 2025-07-23 NOTE — PROGRESS NOTES
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    Wheat Other (See Comments) and Unknown       Runny nose  Runny nose               Current Outpatient Prescriptions          Current Outpatient Medications   Medication Sig Dispense Refill    acetaminophen (TYLENOL) 500 MG tablet Take 500-1,000 mg by mouth 3 times daily.        allopurinol (ZYLOPRIM) 300 MG tablet Take 300 mg by mouth daily        Calcium Carb-Cholecalciferol 500-10 MG-MCG TABS Take 1 tablet by mouth daily.        ketoconazole (NIZORAL) 2 % external shampoo Apply to the scalp for 5 minutes in the shower three times a week 120 mL 3    lisinopril (PRINIVIL,ZESTRIL) 20 MG tablet Take 20 mg by mouth 2 times daily        melatonin 3 MG tablet Take 1.5 mg by mouth nightly as needed for sleep.        Multiple Vitamin (MULTI-VITAMINS) TABS 1 tab by mouth daily        potassium chloride ER (K-TAB/KLOR-CON) 10 MEQ CR tablet TAKES 10 MEQ BID   1    simvastatin (ZOCOR) 20 MG tablet Take 1 tablet by mouth At Bedtime        tamsulosin (FLOMAX) 0.4 MG capsule Take 0.4 mg by mouth daily.        triamcinolone  (KENALOG) 0.1 % external ointment Apply topically 2 times daily. Apply to the right lower leg and all other itchy pink spots on the body 80 g 3    triamterene-HCTZ (DYAZIDE) 37.5-25 MG capsule TK 1 C PO D   3    Vitamin D-Vitamin K (DECARA K) 1250-200 MCG CAPS                  Social History              Tobacco Use    Smoking status: Former    Smokeless tobacco: Never   Substance Use Topics    Alcohol use: Not Currently    Drug use: Never            Shailesh Hartman comes into clinic today at the request of Dr Ray for catheter change.     Patient diagnosis: urinary retention        Shailesh Hartman presents to clinic for scheduled [Yes] catheter exchange.  Order has been verified: Yes.     Removal:  14 Fr straight tipped latex case catheter removed from urethral meatus without difficulty.     Insertion:  14 Fr Coude tipped latex case catheter inserted into urethral meatus in the usual sterile fashion without difficulty.  Balloon filled with 8 mL sterile H2O.  Received 50 ml clear urine output.   Catheter secured in place with leg strap: Yes. Cut extension hose 3.5 inches     One Bactrim mg given per protocol: Yes.   The following medication was given:     MEDICATION:  Bactrim  ROUTE: PO  SITE: mouth  DOSE: one dose  LOT #: [id number]  : Major  EXPIRATION DATE: 2027/03  NDC#: 8907-4263-67   Was there drug waste? No    Prior to medication administration, verified patient identity using patient's name and date of birth.  Due to medication administration, patient instructed to remain in clinic for 15 minutes  afterwards, and to report any adverse reaction to me immediately.     Drug Amount Wasted:  None.  Vial/Syringe: Single dose vial     Patient did tolerate procedure well.     Patient instructed as to where to call or go for pain, fever, leakage, or decreased urine flow.      This service provided today was under the supervising provider of the day Dr Coleman, who was available if needed.      Ginger Galvez, RN, BSN  6/23/2025

## 2025-07-24 ENCOUNTER — INFUSION THERAPY VISIT (OUTPATIENT)
Dept: ONCOLOGY | Facility: CLINIC | Age: OVER 89
End: 2025-07-24
Attending: INTERNAL MEDICINE
Payer: MEDICARE

## 2025-07-24 ENCOUNTER — APPOINTMENT (OUTPATIENT)
Dept: LAB | Facility: CLINIC | Age: OVER 89
End: 2025-07-24
Attending: INTERNAL MEDICINE
Payer: MEDICARE

## 2025-07-24 VITALS
HEART RATE: 83 BPM | TEMPERATURE: 97.6 F | BODY MASS INDEX: 29.37 KG/M2 | WEIGHT: 193.8 LBS | RESPIRATION RATE: 16 BRPM | DIASTOLIC BLOOD PRESSURE: 75 MMHG | OXYGEN SATURATION: 98 % | HEIGHT: 68 IN | SYSTOLIC BLOOD PRESSURE: 135 MMHG

## 2025-07-24 DIAGNOSIS — Z79.899 HIGH RISK MEDICATION USE: ICD-10-CM

## 2025-07-24 DIAGNOSIS — C66.2 CANCER OF LEFT URETER (H): Primary | ICD-10-CM

## 2025-07-24 DIAGNOSIS — C77.0 METASTASIS TO CERVICAL LYMPH NODE (H): ICD-10-CM

## 2025-07-24 DIAGNOSIS — I20.89 STABLE ANGINA: ICD-10-CM

## 2025-07-24 LAB
ALBUMIN SERPL BCG-MCNC: 4.2 G/DL (ref 3.5–5.2)
ALP SERPL-CCNC: 106 U/L (ref 40–150)
ALT SERPL W P-5'-P-CCNC: 9 U/L (ref 0–70)
AMYLASE SERPL-CCNC: 66 U/L (ref 28–100)
ANION GAP SERPL CALCULATED.3IONS-SCNC: 12 MMOL/L (ref 7–15)
AST SERPL W P-5'-P-CCNC: 16 U/L (ref 0–45)
BASOPHILS # BLD AUTO: 0.1 10E3/UL (ref 0–0.2)
BASOPHILS NFR BLD AUTO: 1 %
BILIRUB SERPL-MCNC: 0.3 MG/DL
BUN SERPL-MCNC: 31.9 MG/DL (ref 8–23)
CALCIUM SERPL-MCNC: 9.8 MG/DL (ref 8.8–10.4)
CHLORIDE SERPL-SCNC: 106 MMOL/L (ref 98–107)
CORTIS SERPL-MCNC: 6.6 UG/DL
CREAT SERPL-MCNC: 1.32 MG/DL (ref 0.67–1.17)
EGFRCR SERPLBLD CKD-EPI 2021: 50 ML/MIN/1.73M2
EOSINOPHIL # BLD AUTO: 0.3 10E3/UL (ref 0–0.7)
EOSINOPHIL NFR BLD AUTO: 6 %
ERYTHROCYTE [DISTWIDTH] IN BLOOD BY AUTOMATED COUNT: 14 % (ref 10–15)
GLUCOSE SERPL-MCNC: 102 MG/DL (ref 70–99)
HCO3 SERPL-SCNC: 22 MMOL/L (ref 22–29)
HCT VFR BLD AUTO: 33.6 % (ref 40–53)
HGB BLD-MCNC: 10.8 G/DL (ref 13.3–17.7)
IMM GRANULOCYTES # BLD: 0 10E3/UL
IMM GRANULOCYTES NFR BLD: 1 %
LIPASE SERPL-CCNC: 32 U/L (ref 13–60)
LYMPHOCYTES # BLD AUTO: 0.9 10E3/UL (ref 0.8–5.3)
LYMPHOCYTES NFR BLD AUTO: 18 %
MCH RBC QN AUTO: 30.9 PG (ref 26.5–33)
MCHC RBC AUTO-ENTMCNC: 32.1 G/DL (ref 31.5–36.5)
MCV RBC AUTO: 96 FL (ref 78–100)
MONOCYTES # BLD AUTO: 0.4 10E3/UL (ref 0–1.3)
MONOCYTES NFR BLD AUTO: 8 %
NEUTROPHILS # BLD AUTO: 3.6 10E3/UL (ref 1.6–8.3)
NEUTROPHILS NFR BLD AUTO: 67 %
NRBC # BLD AUTO: 0 10E3/UL
NRBC BLD AUTO-RTO: 0 /100
PLATELET # BLD AUTO: 180 10E3/UL (ref 150–450)
POTASSIUM SERPL-SCNC: 4.5 MMOL/L (ref 3.4–5.3)
PROT SERPL-MCNC: 6.5 G/DL (ref 6.4–8.3)
RBC # BLD AUTO: 3.5 10E6/UL (ref 4.4–5.9)
SODIUM SERPL-SCNC: 140 MMOL/L (ref 135–145)
T4 FREE SERPL-MCNC: 1.16 NG/DL (ref 0.9–1.7)
TSH SERPL DL<=0.005 MIU/L-ACNC: 0.68 UIU/ML (ref 0.3–4.2)
WBC # BLD AUTO: 5.3 10E3/UL (ref 4–11)

## 2025-07-24 PROCEDURE — 36415 COLL VENOUS BLD VENIPUNCTURE: CPT | Performed by: INTERNAL MEDICINE

## 2025-07-24 PROCEDURE — 82150 ASSAY OF AMYLASE: CPT | Performed by: INTERNAL MEDICINE

## 2025-07-24 PROCEDURE — 84439 ASSAY OF FREE THYROXINE: CPT | Performed by: INTERNAL MEDICINE

## 2025-07-24 PROCEDURE — 82310 ASSAY OF CALCIUM: CPT | Performed by: INTERNAL MEDICINE

## 2025-07-24 PROCEDURE — 84443 ASSAY THYROID STIM HORMONE: CPT | Performed by: INTERNAL MEDICINE

## 2025-07-24 PROCEDURE — G0463 HOSPITAL OUTPT CLINIC VISIT: HCPCS | Performed by: INTERNAL MEDICINE

## 2025-07-24 PROCEDURE — 85004 AUTOMATED DIFF WBC COUNT: CPT | Performed by: INTERNAL MEDICINE

## 2025-07-24 PROCEDURE — 82533 TOTAL CORTISOL: CPT | Performed by: INTERNAL MEDICINE

## 2025-07-24 PROCEDURE — 258N000003 HC RX IP 258 OP 636: Performed by: INTERNAL MEDICINE

## 2025-07-24 PROCEDURE — 83690 ASSAY OF LIPASE: CPT | Performed by: INTERNAL MEDICINE

## 2025-07-24 RX ORDER — MEPERIDINE HYDROCHLORIDE 25 MG/ML
25 INJECTION INTRAMUSCULAR; INTRAVENOUS; SUBCUTANEOUS
Status: CANCELLED | OUTPATIENT
Start: 2025-07-24

## 2025-07-24 RX ORDER — EPINEPHRINE 1 MG/ML
0.3 INJECTION, SOLUTION INTRAMUSCULAR; SUBCUTANEOUS EVERY 5 MIN PRN
Status: CANCELLED | OUTPATIENT
Start: 2025-07-24

## 2025-07-24 RX ORDER — DIPHENHYDRAMINE HYDROCHLORIDE 50 MG/ML
25 INJECTION, SOLUTION INTRAMUSCULAR; INTRAVENOUS
Status: CANCELLED
Start: 2025-07-24

## 2025-07-24 RX ORDER — DIPHENHYDRAMINE HYDROCHLORIDE 50 MG/ML
50 INJECTION, SOLUTION INTRAMUSCULAR; INTRAVENOUS
Status: CANCELLED
Start: 2025-07-24

## 2025-07-24 RX ORDER — HEPARIN SODIUM (PORCINE) LOCK FLUSH IV SOLN 100 UNIT/ML 100 UNIT/ML
5 SOLUTION INTRAVENOUS
Status: CANCELLED | OUTPATIENT
Start: 2025-07-24

## 2025-07-24 RX ORDER — ALBUTEROL SULFATE 0.83 MG/ML
2.5 SOLUTION RESPIRATORY (INHALATION)
Status: CANCELLED | OUTPATIENT
Start: 2025-07-24

## 2025-07-24 RX ORDER — METHYLPREDNISOLONE SODIUM SUCCINATE 40 MG/ML
40 INJECTION INTRAMUSCULAR; INTRAVENOUS
Status: CANCELLED
Start: 2025-07-24

## 2025-07-24 RX ORDER — LORAZEPAM 2 MG/ML
0.5 INJECTION INTRAMUSCULAR EVERY 4 HOURS PRN
Status: CANCELLED | OUTPATIENT
Start: 2025-07-24

## 2025-07-24 RX ORDER — ALBUTEROL SULFATE 90 UG/1
1-2 INHALANT RESPIRATORY (INHALATION)
Status: CANCELLED
Start: 2025-07-24

## 2025-07-24 RX ORDER — HEPARIN SODIUM,PORCINE 10 UNIT/ML
5-20 VIAL (ML) INTRAVENOUS DAILY PRN
Status: CANCELLED | OUTPATIENT
Start: 2025-07-24

## 2025-07-24 RX ADMIN — SODIUM CHLORIDE 250 ML: 0.9 INJECTION, SOLUTION INTRAVENOUS at 16:08

## 2025-07-24 RX ADMIN — SODIUM CHLORIDE 200 MG: 9 INJECTION, SOLUTION INTRAVENOUS at 16:11

## 2025-07-24 ASSESSMENT — PAIN SCALES - GENERAL: PAINLEVEL_OUTOF10: MILD PAIN (3)

## 2025-07-24 NOTE — NURSING NOTE
Chief Complaint   Patient presents with    Blood Draw     Vitals, blood drawn and PIV placed by LPN. Pt checked into appt.      TJ Angeles LPN

## 2025-07-24 NOTE — NURSING NOTE
"Oncology Rooming Note    July 24, 2025 2:52 PM   Shailesh Hartman is a 89 year old male who presents for:    Chief Complaint   Patient presents with    Blood Draw     Vitals, blood drawn and PIV placed by LPN. Pt checked into appt.     Oncology Clinic Visit     Urothelial Cancer   Prostate Cancer     Initial Vitals: /75   Pulse 83   Temp 97.6  F (36.4  C) (Oral)   Resp 16   Wt 87.9 kg (193 lb 12.8 oz)   SpO2 98%   BMI 29.47 kg/m   Estimated body mass index is 29.47 kg/m  as calculated from the following:    Height as of 6/2/25: 1.727 m (5' 8\").    Weight as of this encounter: 87.9 kg (193 lb 12.8 oz). Body surface area is 2.05 meters squared.  Mild Pain (3) Comment: Data Unavailable   No LMP for male patient.  Allergies reviewed: Yes  Medications reviewed: Yes    Medications: Medication refills not needed today.  Pharmacy name entered into [employer]:    [id number] - [place] - [address] [place] [facility] [address]  [id number] - [place], [place] - [address]    PHQ9:  Did this patient require a PHQ9?: No      Clinical concerns: None       Giuliana De La Cruz LPN  7/24/2025              "

## 2025-07-24 NOTE — PROGRESS NOTES
Infusion Nursing Note:  Shailesh Hartman presents today for C4D1 Keytruda.    Patient seen by provider today: Yes: Dr. Wilson   present during visit today: Not Applicable.    Note: Pt arrived after visit with Dr. Wilson offering no new complaints and agreeable to treatment today.      Intravenous Access:  Peripheral IV placed.    Treatment Conditions:  Lab Results   Component Value Date     07/24/2025    POTASSIUM 4.5 07/24/2025    CR 1.32 (H) 07/24/2025    NAS 9.8 07/24/2025    BILITOTAL 0.3 07/24/2025    ALBUMIN 4.2 07/24/2025    ALT 9 07/24/2025    AST 16 07/24/2025       Results reviewed, labs MET treatment parameters, ok to proceed with treatment.      Post Infusion Assessment:  Patient tolerated infusion without incident.  Blood return noted pre and post infusion.  Site patent and intact, free from redness, edema or discomfort.  No evidence of extravasations.  Access discontinued per protocol.       Discharge Plan:   Patient declined prescription refills.  Discharge instructions reviewed with: Patient and Family.  Patient and/or family verbalized understanding of discharge instructions and all questions answered.  Copy of AVS reviewed with patient and/or family.  Patient will return 08/15 for next appointment.  Patient discharged in stable condition accompanied by: wife.  Departure Mode: Ambulatory.    Administrations This Visit       pembrolizumab (KEYTRUDA) 200 mg in sodium chloride 0.9 % 63 mL infusion       Admin Date  07/24/2025 Action  $New Bag Dose  200 mg Rate  126 mL/hr Route  Intravenous Documented By  Ghazala Crocker RN              sodium chloride 0.9% BOLUS 250 mL       Admin Date  07/24/2025 Action  $New Bag Dose  250 mL Route  Intravenous Documented By  Ghazala Crocker RN Carissa L. Draper, RN

## 2025-07-24 NOTE — LETTER
7/24/2025      Shailesh Hartman  [address]  [place] [address]      Dear Colleague,    Thank you for referring your patient, Shailesh Hartman, to the [facility]. Please see a copy of my visit note below.      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  Patient presents with urinary retention and left hydroureteronephrosis.  CT abdomen, pelvis 02/03/2025, revealed moderate left hydroureteronephrosis with ureter dilation to the level of the distal ureter and a 6.2 cm segment of abnormal diffuse urothelial thickening and enhancement in the distal left ureter.  There was mild prostatomegaly with prostate fiducial markers in place.  Urine cytology 02/25/2025, revealed atypical cells.  Patient underwent cystoscopy ureteroscopy, retrograde left pyelogram, left ureteral biopsy and left ureteral stent placement 02/25/2025, that revealed fibrotic stromal tissue with close side effect and chronic inflammation without evidence of malignancy.  11-C choline PET/CT scan performed for surveillance for previously diagnosed prostate cancer 12/12/2024, revealed stable diffuse choline uptake in the prostate with SUV max 4.  There was a 1.2 cm moderately choline-avid level 2 left cervical lymph node.  There is no other choline-avid lymphadenopathy.  CT neck 12/31/2024, revealed a 1.2 x 1.6 x 1.7 cm level 2A left cervical lymph node corresponding to the choline-avid lymph node on prior 11-C choline PET/CT scan.  There was a 2 x 1.3 x 1.8 cm well-circumscribed enhancing homogeneous mass in the left parotid that was not choline-avid on the prior PET/CT scan. Repeat 11-C choline PET/CT scan 03/10/2025, revealed faint choline uptake throughout the atrophic prostate  that was unchanged.  There was an intensely choline-avid level 2A left cervical lymph node.  There was also an enhancing mass in the left parotid.  There are no other choline-avid lesions. Ultrasound-guided left cervical lymph node core needle biopsy 04/16/2025, revealed malignant epithelioid cells with focal areas of necrosis positive for CK AE1/AE3, GATA3, and p63 (weak), but negative for PSA and NKX3.1 consistent with urothelial carcinoma.  Pathology review at the [facility] revealed metastatic carcinoma with morphology and immunohistochemistry suggesting primary carcinoma from the salivary gland, breast, or urothelium depending on clinical and imaging correlation. 18-F FDG PET/CT scan 05/07/2025, revealed a 1.6 x 1.3 cm level 2A left cervical lymph node with SUV max 16.12 that was a previously biopsied metastatic urothelial carcinoma. There was a 1.6 x 1.5 cm right submandibular lymph node with SUV max 3.82.  There was a 2.1 x 1.3 cm enhancing anterior left parotid lymph node with SUV max 3.99 that was a previously biopsied pleomorphic adenoma.  There was mild left pelvocaliectasis with diffuse pelvicalyceal and ureteral FDG uptake favoring FDG tracer excretion through the ureteral stent, with a focus of hypermetabolism in the distal left ureter proximal to the ureteral stenosis that was indeterminate but in the location of the presumed primary lesion.  There was focal intense FDG uptake near the right posterior apex peripheral zone of the prostate with SUV max 5.3, without CT correlate. Patient is chemotherapy ineligible, and enfortumab vedotin is associated with significant adverse effects including peripheral neuropathy that would lead to considerable morbidity.  Patient is receiving first-line pembrolizumab 200 mg IV every 21 days beginning 05/21/2025.  Patient has mild fatigue, and a stable level of decrease in exercise tolerance, muscle weakness particularly in the lower extremities,  imbalance, mild decrease in appetite, 50-pound weight loss in the past year, stable eczema, and an indwelling Barron catheter due to urinary retention.  Patient had an episode of chest pressure with exercise that resolved with rest.  Patient uses walking sticks for balance and mobility.  He is independent and is able to perform his activities of daily living and some chores.  There are no other new symptoms or events since the prior clinic visit (07/02/2025). He denies fever, headache, cough, dyspnea, chest pain, abdominal pain, nausea, vomiting, constipation, diarrhea, bleeding, or bone pain.     PAST HISTORY: Past history was reviewed and unchanged from the clinic note 05/08/2025.     MEDICATIONS:   Current Outpatient Medications   Medication Sig Dispense Refill     acetaminophen (TYLENOL) 500 MG tablet Take 500-1,000 mg by mouth 3 times daily.       allopurinol (ZYLOPRIM) 300 MG tablet Take 300 mg by mouth daily       Calcium Carb-Cholecalciferol 500-10 MG-MCG TABS Take 1 tablet by mouth daily.       ketoconazole (NIZORAL) 2 % external shampoo Apply to the scalp for 5 minutes in the shower three times a week 120 mL 3     lisinopril (PRINIVIL,ZESTRIL) 20 MG tablet Take 20 mg by mouth 2 times daily       melatonin 3 MG tablet Take 1.5 mg by mouth nightly as needed for sleep.       Multiple Vitamin (MULTI-VITAMINS) TABS 1 tab by mouth daily       potassium chloride ER (K-TAB/KLOR-CON) 10 MEQ CR tablet TAKES 10 MEQ BID  1     simvastatin (ZOCOR) 20 MG tablet Take 1 tablet by mouth At Bedtime       tamsulosin (FLOMAX) 0.4 MG capsule Take 0.4 mg by mouth daily.       triamcinolone (KENALOG) 0.1 % external ointment Apply topically 2 times daily. Apply to the right lower leg and all other itchy pink spots on the body (Patient not taking: Reported on 7/2/2025) 80 g 3     triamterene-HCTZ (DYAZIDE) 37.5-25 MG capsule TK 1 C PO D  3     Vitamin D-Vitamin K (DECARA K) 1250-200 MCG CAPS  (Patient not taking: Reported on  "7/2/2025)         REVIEW OF SYSTEMS: Review of systems reviewed with the patient and otherwise negative except for those detailed above.    PHYSICAL EXAM:  /75   Pulse 83   Temp 97.6  F (36.4  C) (Oral)   Resp 16   Ht 1.727 m (5' 7.99\")   Wt 87.9 kg (193 lb 12.8 oz)   SpO2 98%   BMI 29.47 kg/m  .  ECOG performance status: 2.  Physical exam was unchanged from prior clinic visit 07/02/2025.  Skin: No erythema or rash.  HEENT: Sclera nonicteric. Oropharynx without lesions or ulceration, mucosa pink and moist.  There is a 2 cm nodule in the left parotid gland near the angle of the mandible.  Nodes: No cervical, supraclavicular, axillary, or inguinal adenopathy.  Lungs: No dullness to percussion.  No rales, wheezes, rhonchi.  Heart: Regular rate and rhythm.  Abdomen: Bowel sounds present.  Soft, nontender, no hepatosplenomegaly or mass.  Extremities: No edema.     LABORATORY REVIEWED:  Component      Latest Ref Rng 7/2/2025  1:36 PM 7/24/2025  2:34 PM   WBC      4.0 - 11.0 10e3/uL 6.7  5.3    RBC Count      4.40 - 5.90 10e6/uL 3.57 (L)  3.50 (L)    Hemoglobin      13.3 - 17.7 g/dL 11.0 (L)  10.8 (L)    Hematocrit      40.0 - 53.0 % 34.8 (L)  33.6 (L)    MCV      78 - 100 fL 98  96    MCH      26.5 - 33.0 pg 30.8  30.9    MCHC      31.5 - 36.5 g/dL 31.6  32.1    RDW      10.0 - 15.0 % 13.7  14.0    Platelet Count      150 - 450 10e3/uL 186  180    % Neutrophils      % 74  67    % Lymphocytes      % 13  18    % Monocytes      % 8  8    % Eosinophils      % 4  6    % Basophils      % 1  1    % Immature Granulocytes      % 1  1    NRBC/W      <1 /100 0  0    Absolute Neutrophil      1.6 - 8.3 10e3/uL 5.0  3.6    Absolute Lymphocytes      0.8 - 5.3 10e3/uL 0.9  0.9    Absolute Monocytes      0.0 - 1.3 10e3/uL 0.5  0.4    Absolute Eosinophils      0.0 - 0.7 10e3/uL 0.3  0.3    Absolute Basophils      0.0 - 0.2 10e3/uL 0.1  0.1    Absolute Immature Granulocytes      <=0.4 10e3/uL 0.0  0.0    Absolute NRBCs      " 10e3/uL 0.0  0.0    Sodium      135 - 145 mmol/L 142  140    Potassium      3.4 - 5.3 mmol/L 4.7  4.5    Carbon Dioxide (CO2)      22 - 29 mmol/L 23  22    Anion Gap      7 - 15 mmol/L 12  12    Urea Nitrogen      8.0 - 23.0 mg/dL 31.4 (H)  31.9 (H)    Creatinine      0.67 - 1.17 mg/dL 1.23 (H)  1.32 (H)    GFR Estimate      >60 mL/min/1.73m2 55 (L)  50 (L)    Calcium      8.8 - 10.4 mg/dL 9.7  9.8    Chloride      98 - 107 mmol/L 107  106    Glucose      70 - 99 mg/dL 104 (H)  102 (H)    Alkaline Phosphatase      40 - 150 U/L 102  106    AST      0 - 45 U/L 17  16    ALT      0 - 70 U/L 9  9    Protein Total      6.4 - 8.3 g/dL 6.5  6.5    Albumin      3.5 - 5.2 g/dL 4.4  4.2    Bilirubin Total      <=1.2 mg/dL 0.3  0.3    Cortisol Serum        ug/dL 8.7     Amylase      28 - 100 U/L  66    Lipase      13 - 60 U/L  32    TSH      0.30 - 4.20 uIU/mL  0.68    T4 Free      0.90 - 1.70 ng/dL  1.16        IMPRESSION/PLAN: Stage IV urothelial carcinoma of the distal left ureter.  There is a a 6.2 cm segment of abnormal diffuse urothelial thickening and enhancement in the distal left ureter noted on CT scan (02/03/2025) with metastases to left cervical lymph node detected on FDG PET/CT scan (05/07/2025) and left cervical lymph node biopsy (04/16/2025).  There is left hydroureteronephrosis requiring left ureteral stent placement (02/25/2025) and patient also has a chronic indwelling Barron for prior urinary retention. First-line therapy typically consists of enfortumab vedotin 1.25 mg/kg IV day 1, 8 and pembrolizumab 200 mg IV day 1 every 21 days in accordance with the EV-302 clinical trial (N Engl J Med 2024;390:[id number]).  Patient is elderly with an ECOG 2-3 performance status, and he is chemotherapy ineligible, and enfortumab vedotin is associated with significant adverse effects including peripheral neuropathy that would lead to considerable morbidity.  Patient is receiving first-line pembrolizumab 200 mg IV every 21  days (beginning 05/21/2025) to induce a response and improve progression free survival in accordance with the phase 3 KEYNOTE-361 trial ([employer], 2017; 18 (11):2649-5974).  There is grade 1 fatigue, exercise intolerance, and anemia.  Pembrolizumab will be continued without modification.  Patient will return to the outpatient infusion center 07/24/2025, for cycle 4 of pembrolizumab. I reviewed the risks and side effects of pembrolizumab with the patient, which include fatigue, weakness, anorexia, weight loss, rash, pruritus, dry eyes, dry mouth, headache, cough, dyspnea, abdominal pain, nausea, vomiting, diarrhea, hypothyroidism, adrenal insufficiency, other endocrine disorders, cardiomyopathy, colitis, nephritis, pancreatitis, myositis, arthritis, neuropathy, cerebritis.  Patient understood the indication and risks and agreed to continue therapy.  Cardiology referral was requested for evaluation of stable angina.  Patient will return to clinic in 3 weeks with CBC, metabolic panel, amylase, lipase, TSH, free T4, cortisol, 18-F FDG PET/CT scan, and for pembrolizumab cycle 5. The current and past history, clinical evaluation, reviewing diagnostic tests with the patient, assessment, complex management of immune checkpoint inhibitor toxicities, and planning occurred over 30 minutes.       Roni Wilson MD    cc: MD Tacho Ingram, MD Perico Jacobs    Again, thank you for allowing me to participate in the care of your patient.        Sincerely,        Roni Wilson MD    Electronically signed

## 2025-07-24 NOTE — PATIENT INSTRUCTIONS
EDUCATION POST BIOLOGICAL/CHEMOTHERAPY INFUSION  Call the triage nurse at your clinic or seek medical attention if you have chills and/or temperature greater than or equal to 100.5, uncontrolled nausea/vomiting, diarrhea, constipation, dizziness, shortness of breath, chest pain, heart palpitations, weakness or any other new or concerning symptoms, questions or concerns.  You can not have any live virus vaccines prior to or during treatment or up to 6 months post infusion.  If you have an upcoming surgery, medical procedure or dental procedure during treatment, this should be discussed with your ordering physician and your surgeon/dentist.  If you are having any concerning symptom, if you are unsure if you should get your next infusion or wish to speak to a provider before your next infusion, please call your care coordinator or triage nurse at your clinic to notify them so we can adequately serve you.     [employer] Triage and after hours / weekends / holidays:  [phone]    Please call the [employer] Triage line if you experience a temperature greater than or equal to 100.4, shaking chills, have uncontrolled nausea, vomiting and/or diarrhea, dizziness, shortness of breath, chest pain, bleeding, unexplained bruising, or if you have any other new/concerning symptoms, questions or concerns.     If you wish to speak to a provider before your next infusion visit, please call your care coordinator to notify them so we can adequately serve you.    If you need a refill on a narcotic prescription or other medication, please call before your infusion appointment.

## 2025-08-05 ENCOUNTER — THERAPY VISIT (OUTPATIENT)
Dept: PHYSICAL THERAPY | Facility: CLINIC | Age: OVER 89
End: 2025-08-05
Payer: MEDICARE

## 2025-08-05 DIAGNOSIS — R53.81 PHYSICAL DECONDITIONING: ICD-10-CM

## 2025-08-05 DIAGNOSIS — R68.89 DECREASED FUNCTIONAL ACTIVITY TOLERANCE: ICD-10-CM

## 2025-08-05 DIAGNOSIS — R26.89 IMPAIRED GAIT AND MOBILITY: ICD-10-CM

## 2025-08-05 DIAGNOSIS — C66.2 CANCER OF LEFT URETER (H): Primary | ICD-10-CM

## 2025-08-05 PROCEDURE — 97750 PHYSICAL PERFORMANCE TEST: CPT | Mod: GP | Performed by: PHYSICAL THERAPIST

## 2025-08-05 PROCEDURE — 97110 THERAPEUTIC EXERCISES: CPT | Mod: GP | Performed by: PHYSICAL THERAPIST

## 2025-08-11 ENCOUNTER — HOSPITAL ENCOUNTER (OUTPATIENT)
Dept: PET IMAGING | Facility: CLINIC | Age: OVER 89
Discharge: HOME | End: 2025-08-11
Attending: INTERNAL MEDICINE
Payer: MEDICARE

## 2025-08-11 DIAGNOSIS — C77.0 METASTASIS TO CERVICAL LYMPH NODE (H): ICD-10-CM

## 2025-08-11 DIAGNOSIS — C66.2 CANCER OF LEFT URETER (H): ICD-10-CM

## 2025-08-11 DIAGNOSIS — Z79.899 HIGH RISK MEDICATION USE: ICD-10-CM

## 2025-08-11 LAB
CREAT BLD-MCNC: 1.2 MG/DL (ref 0.7–1.2)
EGFRCR SERPLBLD CKD-EPI 2021: 56 ML/MIN/1.73M2

## 2025-08-11 PROCEDURE — 78813 PET IMAGE FULL BODY: CPT | Mod: PS

## 2025-08-11 PROCEDURE — 70491 CT SOFT TISSUE NECK W/DYE: CPT | Mod: 26 | Performed by: RADIOLOGY

## 2025-08-11 PROCEDURE — 250N000011 HC RX IP 250 OP 636: Performed by: INTERNAL MEDICINE

## 2025-08-11 PROCEDURE — 343N000001 HC RX 343 MED OP 636: Performed by: INTERNAL MEDICINE

## 2025-08-11 PROCEDURE — 82565 ASSAY OF CREATININE: CPT

## 2025-08-11 PROCEDURE — A9552 F18 FDG: HCPCS | Performed by: INTERNAL MEDICINE

## 2025-08-11 PROCEDURE — 70491 CT SOFT TISSUE NECK W/DYE: CPT

## 2025-08-11 PROCEDURE — 71260 CT THORAX DX C+: CPT

## 2025-08-11 RX ORDER — IOPAMIDOL 755 MG/ML
10-135 INJECTION, SOLUTION INTRAVASCULAR ONCE
Status: COMPLETED | OUTPATIENT
Start: 2025-08-11 | End: 2025-08-11

## 2025-08-11 RX ORDER — FLUDEOXYGLUCOSE F 18 200 MCI/ML
10-18 INJECTION, SOLUTION INTRAVENOUS ONCE
Status: COMPLETED | OUTPATIENT
Start: 2025-08-11 | End: 2025-08-11

## 2025-08-11 RX ADMIN — IOPAMIDOL 112 ML: 755 INJECTION, SOLUTION INTRAVENOUS at 15:26

## 2025-08-11 RX ADMIN — FLUDEOXYGLUCOSE F 18 11.89 MILLICURIE: 200 INJECTION, SOLUTION INTRAVENOUS at 14:38

## 2025-08-13 ENCOUNTER — ALLIED HEALTH/NURSE VISIT (OUTPATIENT)
Dept: UROLOGY | Facility: CLINIC | Age: OVER 89
End: 2025-08-13
Payer: MEDICARE

## 2025-08-13 DIAGNOSIS — R33.9 URINARY RETENTION: Primary | ICD-10-CM

## 2025-08-13 LAB
RADIOLOGIST FLAGS: ABNORMAL

## 2025-08-15 ENCOUNTER — APPOINTMENT (OUTPATIENT)
Dept: LAB | Facility: CLINIC | Age: OVER 89
End: 2025-08-15
Payer: MEDICARE

## 2025-08-15 ENCOUNTER — ONCOLOGY VISIT (OUTPATIENT)
Dept: ONCOLOGY | Facility: CLINIC | Age: OVER 89
End: 2025-08-15
Attending: INTERNAL MEDICINE
Payer: MEDICARE

## 2025-08-15 ASSESSMENT — PAIN SCALES - GENERAL: PAINLEVEL_OUTOF10: NO PAIN (0)

## 2025-08-18 ENCOUNTER — NURSE TRIAGE (OUTPATIENT)
Dept: NURSING | Facility: CLINIC | Age: OVER 89
End: 2025-08-18
Payer: MEDICARE

## 2025-08-18 ENCOUNTER — DOCUMENTATION ONLY (OUTPATIENT)
Dept: UROLOGY | Facility: CLINIC | Age: OVER 89
End: 2025-08-18
Payer: MEDICARE

## 2025-08-18 ENCOUNTER — TELEPHONE (OUTPATIENT)
Dept: CARDIOLOGY | Facility: CLINIC | Age: OVER 89
End: 2025-08-18
Payer: MEDICARE

## 2025-08-18 DIAGNOSIS — N28.89 URETERAL MASS: ICD-10-CM

## 2025-08-18 DIAGNOSIS — N13.5 STRICTURE OR KINKING OF URETER: Primary | ICD-10-CM

## 2025-08-18 RX ORDER — MIRABEGRON 25 MG/1
25 TABLET, FILM COATED, EXTENDED RELEASE ORAL DAILY
Qty: 30 TABLET | Refills: 0 | Status: SHIPPED | OUTPATIENT
Start: 2025-08-18

## 2025-08-19 ENCOUNTER — OFFICE VISIT (OUTPATIENT)
Dept: UROLOGY | Facility: CLINIC | Age: OVER 89
End: 2025-08-19
Payer: MEDICARE

## 2025-08-19 ENCOUNTER — PRE VISIT (OUTPATIENT)
Dept: UROLOGY | Facility: CLINIC | Age: OVER 89
End: 2025-08-19

## 2025-08-19 VITALS
DIASTOLIC BLOOD PRESSURE: 65 MMHG | BODY MASS INDEX: 29.77 KG/M2 | HEART RATE: 60 BPM | WEIGHT: 201 LBS | HEIGHT: 69 IN | OXYGEN SATURATION: 95 % | SYSTOLIC BLOOD PRESSURE: 113 MMHG

## 2025-08-19 DIAGNOSIS — R33.9 URINARY RETENTION: ICD-10-CM

## 2025-08-19 DIAGNOSIS — R39.89 URETHRAL PAIN: Primary | ICD-10-CM

## 2025-08-19 PROCEDURE — 1125F AMNT PAIN NOTED PAIN PRSNT: CPT | Performed by: NURSE PRACTITIONER

## 2025-08-19 PROCEDURE — 99213 OFFICE O/P EST LOW 20 MIN: CPT | Performed by: NURSE PRACTITIONER

## 2025-08-19 PROCEDURE — 3074F SYST BP LT 130 MM HG: CPT | Performed by: NURSE PRACTITIONER

## 2025-08-19 PROCEDURE — 3078F DIAST BP <80 MM HG: CPT | Performed by: NURSE PRACTITIONER

## 2025-08-19 ASSESSMENT — PAIN SCALES - GENERAL: PAINLEVEL_OUTOF10: MODERATE PAIN (5)

## 2025-09-04 ENCOUNTER — INFUSION THERAPY VISIT (OUTPATIENT)
Dept: ONCOLOGY | Facility: CLINIC | Age: OVER 89
End: 2025-09-04
Attending: INTERNAL MEDICINE
Payer: MEDICARE

## 2025-09-04 VITALS
BODY MASS INDEX: 30.01 KG/M2 | WEIGHT: 202.6 LBS | DIASTOLIC BLOOD PRESSURE: 68 MMHG | HEIGHT: 69 IN | HEART RATE: 79 BPM | SYSTOLIC BLOOD PRESSURE: 116 MMHG | TEMPERATURE: 97.8 F | OXYGEN SATURATION: 96 % | RESPIRATION RATE: 16 BRPM

## 2025-09-04 DIAGNOSIS — C77.0 METASTASIS TO CERVICAL LYMPH NODE (H): ICD-10-CM

## 2025-09-04 DIAGNOSIS — Z79.899 HIGH RISK MEDICATION USE: ICD-10-CM

## 2025-09-04 DIAGNOSIS — C66.2 CANCER OF LEFT URETER (H): Primary | ICD-10-CM

## 2025-09-04 LAB
ALBUMIN SERPL BCG-MCNC: 4.3 G/DL (ref 3.5–5.2)
ALP SERPL-CCNC: 104 U/L (ref 40–150)
ALT SERPL W P-5'-P-CCNC: 9 U/L (ref 0–70)
AMYLASE SERPL-CCNC: 57 U/L (ref 28–100)
ANION GAP SERPL CALCULATED.3IONS-SCNC: 13 MMOL/L (ref 7–15)
AST SERPL W P-5'-P-CCNC: 14 U/L (ref 0–45)
BASOPHILS # BLD AUTO: 0.05 10E3/UL (ref 0–0.2)
BASOPHILS NFR BLD AUTO: 0.6 %
BILIRUB SERPL-MCNC: 0.4 MG/DL
BUN SERPL-MCNC: 33.7 MG/DL (ref 8–23)
CALCIUM SERPL-MCNC: 10 MG/DL (ref 8.8–10.4)
CHLORIDE SERPL-SCNC: 107 MMOL/L (ref 98–107)
CORTIS SERPL-MCNC: 13.3 UG/DL
CREAT SERPL-MCNC: 1.36 MG/DL (ref 0.67–1.17)
EGFRCR SERPLBLD CKD-EPI 2021: 49 ML/MIN/1.73M2
EOSINOPHIL # BLD AUTO: 0.4 10E3/UL (ref 0–0.7)
EOSINOPHIL NFR BLD AUTO: 4.6 %
ERYTHROCYTE [DISTWIDTH] IN BLOOD BY AUTOMATED COUNT: 14.5 % (ref 10–15)
GLUCOSE SERPL-MCNC: 100 MG/DL (ref 70–99)
HCO3 SERPL-SCNC: 23 MMOL/L (ref 22–29)
HCT VFR BLD AUTO: 34.2 % (ref 40–53)
HGB BLD-MCNC: 10.7 G/DL (ref 13.3–17.7)
IMM GRANULOCYTES # BLD: 0.09 10E3/UL
IMM GRANULOCYTES NFR BLD: 1 %
LIPASE SERPL-CCNC: 38 U/L (ref 13–60)
LYMPHOCYTES # BLD AUTO: 0.77 10E3/UL (ref 0.8–5.3)
LYMPHOCYTES NFR BLD AUTO: 8.9 %
MCH RBC QN AUTO: 30.5 PG (ref 26.5–33)
MCHC RBC AUTO-ENTMCNC: 31.3 G/DL (ref 31.5–36.5)
MCV RBC AUTO: 97.4 FL (ref 78–100)
MONOCYTES # BLD AUTO: 0.58 10E3/UL (ref 0–1.3)
MONOCYTES NFR BLD AUTO: 6.7 %
NEUTROPHILS # BLD AUTO: 6.81 10E3/UL (ref 1.6–8.3)
NEUTROPHILS NFR BLD AUTO: 78.2 %
NRBC # BLD AUTO: <0.03 10E3/UL
NRBC BLD AUTO-RTO: 0 /100
PLATELET # BLD AUTO: 196 10E3/UL (ref 150–450)
POTASSIUM SERPL-SCNC: 4.5 MMOL/L (ref 3.4–5.3)
PROT SERPL-MCNC: 6.7 G/DL (ref 6.4–8.3)
RBC # BLD AUTO: 3.51 10E6/UL (ref 4.4–5.9)
SODIUM SERPL-SCNC: 143 MMOL/L (ref 135–145)
T4 FREE SERPL-MCNC: 1.26 NG/DL (ref 0.9–1.7)
TSH SERPL DL<=0.005 MIU/L-ACNC: 0.76 UIU/ML (ref 0.3–4.2)
WBC # BLD AUTO: 8.7 10E3/UL (ref 4–11)

## 2025-09-04 PROCEDURE — 82150 ASSAY OF AMYLASE: CPT | Performed by: INTERNAL MEDICINE

## 2025-09-04 PROCEDURE — 85004 AUTOMATED DIFF WBC COUNT: CPT | Performed by: INTERNAL MEDICINE

## 2025-09-04 PROCEDURE — 82533 TOTAL CORTISOL: CPT | Performed by: INTERNAL MEDICINE

## 2025-09-04 PROCEDURE — 84439 ASSAY OF FREE THYROXINE: CPT | Performed by: INTERNAL MEDICINE

## 2025-09-04 PROCEDURE — 83690 ASSAY OF LIPASE: CPT | Performed by: INTERNAL MEDICINE

## 2025-09-04 PROCEDURE — G0463 HOSPITAL OUTPT CLINIC VISIT: HCPCS | Performed by: INTERNAL MEDICINE

## 2025-09-04 PROCEDURE — 258N000003 HC RX IP 258 OP 636: Performed by: INTERNAL MEDICINE

## 2025-09-04 PROCEDURE — 80053 COMPREHEN METABOLIC PANEL: CPT | Performed by: INTERNAL MEDICINE

## 2025-09-04 PROCEDURE — 84443 ASSAY THYROID STIM HORMONE: CPT | Performed by: INTERNAL MEDICINE

## 2025-09-04 PROCEDURE — 36415 COLL VENOUS BLD VENIPUNCTURE: CPT | Performed by: INTERNAL MEDICINE

## 2025-09-04 RX ORDER — DIPHENHYDRAMINE HYDROCHLORIDE 50 MG/ML
25 INJECTION INTRAMUSCULAR; INTRAVENOUS
Status: CANCELLED
Start: 2025-09-04

## 2025-09-04 RX ORDER — METHYLPREDNISOLONE SODIUM SUCCINATE 40 MG/ML
40 INJECTION INTRAMUSCULAR; INTRAVENOUS
Status: CANCELLED
Start: 2025-09-04

## 2025-09-04 RX ORDER — ALBUTEROL SULFATE 0.83 MG/ML
2.5 SOLUTION RESPIRATORY (INHALATION)
Status: CANCELLED | OUTPATIENT
Start: 2025-09-04

## 2025-09-04 RX ORDER — HEPARIN SODIUM,PORCINE 10 UNIT/ML
5-20 VIAL (ML) INTRAVENOUS DAILY PRN
Status: CANCELLED | OUTPATIENT
Start: 2025-09-04

## 2025-09-04 RX ORDER — EPINEPHRINE 1 MG/ML
0.3 INJECTION, SOLUTION INTRAMUSCULAR; SUBCUTANEOUS EVERY 5 MIN PRN
Status: CANCELLED | OUTPATIENT
Start: 2025-09-04

## 2025-09-04 RX ORDER — HEPARIN SODIUM (PORCINE) LOCK FLUSH IV SOLN 100 UNIT/ML 100 UNIT/ML
5 SOLUTION INTRAVENOUS
Status: CANCELLED | OUTPATIENT
Start: 2025-09-04

## 2025-09-04 RX ORDER — ALBUTEROL SULFATE 90 UG/1
1-2 INHALANT RESPIRATORY (INHALATION)
Status: CANCELLED
Start: 2025-09-04

## 2025-09-04 RX ORDER — DIPHENHYDRAMINE HYDROCHLORIDE 50 MG/ML
50 INJECTION INTRAMUSCULAR; INTRAVENOUS
Status: CANCELLED
Start: 2025-09-04

## 2025-09-04 RX ORDER — MEPERIDINE HYDROCHLORIDE 25 MG/ML
25 INJECTION INTRAMUSCULAR; INTRAVENOUS; SUBCUTANEOUS
Status: CANCELLED | OUTPATIENT
Start: 2025-09-04

## 2025-09-04 RX ORDER — LORAZEPAM 2 MG/ML
0.5 INJECTION INTRAMUSCULAR EVERY 4 HOURS PRN
Status: CANCELLED | OUTPATIENT
Start: 2025-09-04

## 2025-09-04 RX ADMIN — SODIUM CHLORIDE 250 ML: 0.9 INJECTION, SOLUTION INTRAVENOUS at 13:17

## 2025-09-04 RX ADMIN — SODIUM CHLORIDE 200 MG: 9 INJECTION, SOLUTION INTRAVENOUS at 13:17

## 2025-09-04 ASSESSMENT — PAIN SCALES - GENERAL: PAINLEVEL_OUTOF10: MODERATE PAIN (6)

## (undated) DEVICE — GLOVE BIOGEL PI MICRO SZ 8.0 48580

## (undated) DEVICE — PAD CHUX UNDERPAD 23X36" 676105

## (undated) DEVICE — PAD CHUX UNDERPAD 23X24" 7136

## (undated) DEVICE — TUBING SET THERMEDX UROLOGY SGL USE LL0006

## (undated) DEVICE — LINEN TOWEL PACK X5 5464

## (undated) DEVICE — CATH URETERAL DUAL LUMEN 10FRX54CM M0064051000

## (undated) DEVICE — GUIDEWIRE AMPLATZ 0.035"X145CM  SUPER STIFF 640-104

## (undated) DEVICE — SUCTION MANIFOLD NEPTUNE 2 SYS 4 PORT 0702-020-000

## (undated) DEVICE — GOWN XLG DISP 9545

## (undated) DEVICE — GUIDEWIRE ZIPWIRE ANG STIFF .035"X150CM M006630223B0

## (undated) DEVICE — DRAPE C-ARM W/STRAPS 42X72" 07-CA104

## (undated) DEVICE — PACK CYSTO UMMC CUSTOM

## (undated) DEVICE — TRAY CATH SURESTEP OD14 FR INTERMITTENT STER LTX INTS14

## (undated) DEVICE — CATH URETERAL OPEN END 05FR 70CM 020015

## (undated) DEVICE — SOL NACL 0.9% INJ 1000ML BAG 07983-09

## (undated) DEVICE — CATH FOLEY 14FR 5ML SILVER COAT LATEX 0165SI14

## (undated) DEVICE — GLOVE BIOGEL PI MICRO SZ 8.5 48585

## (undated) DEVICE — GOWN SRG XL LVL 3 NONREINFORCE SET IN SLV STRL LF 9545

## (undated) DEVICE — ENDO SEAL BX PORT BPS-A

## (undated) DEVICE — CONNECTOR WATER VALVE PERFUSION PACK STR 020272801

## (undated) DEVICE — GUIDEWIRE SENSOR DUAL FLEX STR 0.035"X150CM M0066703080

## (undated) DEVICE — CATH URETERAL OPEN END 5FRX70CM M0064002010

## (undated) DEVICE — SOL WATER IRRIG 1000ML BOTTLE 2F7114

## (undated) DEVICE — SOL NACL 0.9% IRRIG 3000ML BAG 2B7127

## (undated) DEVICE — DRSG TELFA 3X8" 1238

## (undated) DEVICE — FCP BIOPSY URETEROSCOPIC PIRAHNA  3FR M0065051600

## (undated) DEVICE — SYR 10ML LL W/O NDL 302995

## (undated) DEVICE — SOL NACL 0.9% IRRIG 3000ML BAG 2B7477

## (undated) RX ORDER — FUROSEMIDE 10 MG/ML
INJECTION INTRAMUSCULAR; INTRAVENOUS
Status: DISPENSED
Start: 2025-03-04

## (undated) RX ORDER — OXYCODONE HYDROCHLORIDE 5 MG/1
TABLET ORAL
Status: DISPENSED
Start: 2025-06-02

## (undated) RX ORDER — FENTANYL CITRATE-0.9 % NACL/PF 10 MCG/ML
PLASTIC BAG, INJECTION (ML) INTRAVENOUS
Status: DISPENSED
Start: 2025-02-25

## (undated) RX ORDER — DEXAMETHASONE SODIUM PHOSPHATE 4 MG/ML
INJECTION, SOLUTION INTRA-ARTICULAR; INTRALESIONAL; INTRAMUSCULAR; INTRAVENOUS; SOFT TISSUE
Status: DISPENSED
Start: 2025-02-25

## (undated) RX ORDER — SULFAMETHOXAZOLE AND TRIMETHOPRIM 800; 160 MG/1; MG/1
TABLET ORAL
Status: DISPENSED
Start: 2025-08-13

## (undated) RX ORDER — LIDOCAINE HYDROCHLORIDE 20 MG/ML
JELLY TOPICAL
Status: DISPENSED
Start: 2025-06-04

## (undated) RX ORDER — CEFAZOLIN SODIUM/WATER 2 G/20 ML
SYRINGE (ML) INTRAVENOUS
Status: DISPENSED
Start: 2025-06-02

## (undated) RX ORDER — CIPROFLOXACIN 500 MG/1
TABLET, FILM COATED ORAL
Status: DISPENSED
Start: 2025-03-26

## (undated) RX ORDER — IOPAMIDOL 510 MG/ML
INJECTION, SOLUTION INTRAVASCULAR
Status: DISPENSED
Start: 2025-02-25

## (undated) RX ORDER — FENTANYL CITRATE 50 UG/ML
INJECTION, SOLUTION INTRAMUSCULAR; INTRAVENOUS
Status: DISPENSED
Start: 2025-02-25

## (undated) RX ORDER — LIDOCAINE HYDROCHLORIDE 20 MG/ML
JELLY TOPICAL
Status: DISPENSED
Start: 2025-03-26

## (undated) RX ORDER — GLYCOPYRROLATE 0.2 MG/ML
INJECTION, SOLUTION INTRAMUSCULAR; INTRAVENOUS
Status: DISPENSED
Start: 2025-02-25

## (undated) RX ORDER — ONDANSETRON 2 MG/ML
INJECTION INTRAMUSCULAR; INTRAVENOUS
Status: DISPENSED
Start: 2025-02-25

## (undated) RX ORDER — SULFAMETHOXAZOLE AND TRIMETHOPRIM 800; 160 MG/1; MG/1
TABLET ORAL
Status: DISPENSED
Start: 2025-07-23

## (undated) RX ORDER — ACETAMINOPHEN 325 MG/1
TABLET ORAL
Status: DISPENSED
Start: 2025-02-25

## (undated) RX ORDER — CIPROFLOXACIN 500 MG/1
TABLET, FILM COATED ORAL
Status: DISPENSED
Start: 2025-04-25

## (undated) RX ORDER — CEFAZOLIN SODIUM/WATER 2 G/20 ML
SYRINGE (ML) INTRAVENOUS
Status: DISPENSED
Start: 2025-02-25

## (undated) RX ORDER — ACETAMINOPHEN 325 MG/1
TABLET ORAL
Status: DISPENSED
Start: 2025-06-02

## (undated) RX ORDER — LIDOCAINE HYDROCHLORIDE 20 MG/ML
JELLY TOPICAL
Status: DISPENSED
Start: 2025-08-13

## (undated) RX ORDER — CIPROFLOXACIN 500 MG/1
TABLET, FILM COATED ORAL
Status: DISPENSED
Start: 2025-08-13

## (undated) RX ORDER — FENTANYL CITRATE 50 UG/ML
INJECTION, SOLUTION INTRAMUSCULAR; INTRAVENOUS
Status: DISPENSED
Start: 2025-06-02

## (undated) RX ORDER — LIDOCAINE HYDROCHLORIDE 20 MG/ML
JELLY TOPICAL
Status: DISPENSED
Start: 2025-07-23

## (undated) RX ORDER — OXYCODONE HYDROCHLORIDE 5 MG/1
TABLET ORAL
Status: DISPENSED
Start: 2025-02-25

## (undated) RX ORDER — PROPOFOL 10 MG/ML
INJECTION, EMULSION INTRAVENOUS
Status: DISPENSED
Start: 2025-02-25

## (undated) RX ORDER — LIDOCAINE HYDROCHLORIDE 20 MG/ML
JELLY TOPICAL
Status: DISPENSED
Start: 2025-04-25

## (undated) RX ORDER — SULFAMETHOXAZOLE AND TRIMETHOPRIM 800; 160 MG/1; MG/1
TABLET ORAL
Status: DISPENSED
Start: 2025-03-31